# Patient Record
Sex: FEMALE | Race: WHITE | Employment: OTHER | URBAN - METROPOLITAN AREA
[De-identification: names, ages, dates, MRNs, and addresses within clinical notes are randomized per-mention and may not be internally consistent; named-entity substitution may affect disease eponyms.]

---

## 2017-01-03 ENCOUNTER — APPOINTMENT (OUTPATIENT)
Dept: GENERAL RADIOLOGY | Age: 67
End: 2017-01-03
Attending: FAMILY MEDICINE

## 2017-01-03 ENCOUNTER — HOSPITAL ENCOUNTER (OUTPATIENT)
Age: 67
Discharge: HOME | End: 2017-01-03
Attending: FAMILY MEDICINE

## 2017-01-03 VITALS
HEIGHT: 60 IN | OXYGEN SATURATION: 100 % | BODY MASS INDEX: 26.9 KG/M2 | HEART RATE: 80 BPM | RESPIRATION RATE: 14 BRPM | WEIGHT: 137 LBS | TEMPERATURE: 99 F | SYSTOLIC BLOOD PRESSURE: 139 MMHG | DIASTOLIC BLOOD PRESSURE: 85 MMHG

## 2017-01-03 DIAGNOSIS — J20.9 ACUTE BRONCHITIS, UNSPECIFIED ORGANISM: ICD-10-CM

## 2017-01-03 DIAGNOSIS — J02.9 ACUTE PHARYNGITIS, UNSPECIFIED ETIOLOGY: Primary | ICD-10-CM

## 2017-01-03 LAB — POCT RAPID STREP: NEGATIVE

## 2017-01-03 PROCEDURE — 87430 STREP A AG IA: CPT | Performed by: FAMILY MEDICINE

## 2017-01-03 PROCEDURE — 71020 XR CHEST PA + LAT CHEST (CPT=71020): CPT

## 2017-01-03 PROCEDURE — 99214 OFFICE O/P EST MOD 30 MIN: CPT

## 2017-01-03 PROCEDURE — 99204 OFFICE O/P NEW MOD 45 MIN: CPT

## 2017-01-03 PROCEDURE — 87081 CULTURE SCREEN ONLY: CPT | Performed by: FAMILY MEDICINE

## 2017-01-03 RX ORDER — AZITHROMYCIN 200 MG/5ML
POWDER, FOR SUSPENSION ORAL
Qty: 1 BOTTLE | Refills: 0 | Status: SHIPPED | OUTPATIENT
Start: 2017-01-03 | End: 2017-01-19 | Stop reason: ALTCHOICE

## 2017-01-03 RX ORDER — AZITHROMYCIN 250 MG/1
TABLET, FILM COATED ORAL
Qty: 1 PACKAGE | Refills: 0 | Status: SHIPPED | OUTPATIENT
Start: 2017-01-03 | End: 2017-01-03

## 2017-01-03 NOTE — ED PROVIDER NOTES
Patient Seen in: [facility]    History   Patient presents with:  Fever  Cough/URI  Sore Throat    Stated Complaint: fever, cough x3 days    HPI    Talya Meghann is a 74year old female presented with chief complaint of sor Valsartan-Hydrochlorothiazide (DIOVAN HCT) 160-25 MG Oral Tab,  1 TABLET DAILY       Family History   Problem Relation Age of Onset   • Heart Attack Father    • Heart Disorder Mother    • Infectious Disease Maternal Grandmother    • Diabetes Brother    • S regular rhythm and normal heart sounds. Pulmonary/Chest: Effort normal and breath sounds normal. No respiratory distress. She has no wheezes. Neurological: She is alert and oriented to person, place, and time.        ED Course     Labs Reviewed   POCT

## 2017-01-03 NOTE — ED INITIAL ASSESSMENT (HPI)
The patient is here with complaints of a cough, sore throat, and fever. She states her fever was 100 this morning when she woke up and the sore throat and cough have been present x 4 days. She states the phlegm she has been coughing up is clear in color.

## 2017-01-03 NOTE — ED NOTES
Spoke with pharmacy tech at [facility] and the prescription for azithromycin pill form was cancelled as patient wants the liquid.

## 2017-01-09 ENCOUNTER — PRIOR ORIGINAL RECORDS (OUTPATIENT)
Dept: OTHER | Age: 67
End: 2017-01-09

## 2017-01-09 ENCOUNTER — LAB ENCOUNTER (OUTPATIENT)
Dept: LAB | Facility: HOSPITAL | Age: 67
End: 2017-01-09
Attending: SPEECH-LANGUAGE PATHOLOGIST
Payer: MEDICARE

## 2017-01-09 DIAGNOSIS — I10 ESSENTIAL HYPERTENSION, MALIGNANT: ICD-10-CM

## 2017-01-09 DIAGNOSIS — E78.00 PURE HYPERCHOLESTEROLEMIA: ICD-10-CM

## 2017-01-09 DIAGNOSIS — E03.9 UNSPECIFIED HYPOTHYROIDISM: ICD-10-CM

## 2017-01-09 DIAGNOSIS — E55.0 RICKETS, ACTIVE: Primary | ICD-10-CM

## 2017-01-09 LAB
25-HYDROXYVITAMIN D (TOTAL): 41.3 NG/ML (ref 30–100)
ALBUMIN SERPL-MCNC: 3.2 G/DL (ref 3.5–4.8)
ALP LIVER SERPL-CCNC: 67 U/L (ref 55–142)
ALT SERPL-CCNC: 18 U/L (ref 14–54)
AST SERPL-CCNC: 21 U/L (ref 15–41)
BILIRUB SERPL-MCNC: 0.6 MG/DL (ref 0.1–2)
BUN BLD-MCNC: 14 MG/DL (ref 8–20)
CALCIUM BLD-MCNC: 8.8 MG/DL (ref 8.3–10.3)
CHLORIDE: 106 MMOL/L (ref 101–111)
CHOLEST SMN-MCNC: 144 MG/DL (ref ?–200)
CO2: 27 MMOL/L (ref 22–32)
CREAT BLD-MCNC: 0.75 MG/DL (ref 0.55–1.02)
GLUCOSE BLD-MCNC: 78 MG/DL (ref 70–99)
HDLC SERPL-MCNC: 77 MG/DL (ref 45–?)
HDLC SERPL: 1.87 {RATIO} (ref ?–4.44)
LDLC SERPL CALC-MCNC: 54 MG/DL (ref ?–130)
M PROTEIN MFR SERPL ELPH: 6.7 G/DL (ref 6.1–8.3)
NONHDLC SERPL-MCNC: 67 MG/DL (ref ?–130)
POTASSIUM SERPL-SCNC: 4.3 MMOL/L (ref 3.6–5.1)
SODIUM SERPL-SCNC: 139 MMOL/L (ref 136–144)
TRIGLYCERIDES: 67 MG/DL (ref ?–150)
TSI SER-ACNC: 2.11 MIU/ML (ref 0.35–5.5)
VLDL: 13 MG/DL (ref 5–40)

## 2017-01-09 PROCEDURE — 84443 ASSAY THYROID STIM HORMONE: CPT

## 2017-01-09 PROCEDURE — 80053 COMPREHEN METABOLIC PANEL: CPT

## 2017-01-09 PROCEDURE — 36415 COLL VENOUS BLD VENIPUNCTURE: CPT

## 2017-01-09 PROCEDURE — 80061 LIPID PANEL: CPT

## 2017-01-09 PROCEDURE — 82306 VITAMIN D 25 HYDROXY: CPT

## 2017-01-11 LAB
ALBUMIN: 3.2 G/DL
ALKALINE PHOSPHATATE(ALK PHOS): 67 IU/L
BILIRUBIN TOTAL: 0.6 MG/DL
BUN: 14 MG/DL
CALCIUM: 8.8 MG/DL
CHLORIDE: 106 MEQ/L
CHOLESTEROL, TOTAL: 144 MG/DL
CREATININE, SERUM: 0.75 MG/DL
GLUCOSE: 78 MG/DL
HDL CHOLESTEROL: 77 MG/DL
LDL CHOLESTEROL: 54 MG/DL
POTASSIUM, SERUM: 4.3 MEQ/L
PROTEIN, TOTAL: 6.7 G/DL
SGOT (AST): 21 IU/L
SGPT (ALT): 18 IU/L
SODIUM: 139 MEQ/L
THYROID STIMULATING HORMONE: 2.11 MLU/L
TRIGLYCERIDES: 67 MG/DL
VITAMIN D 25-OH: 41.3 NG/ML

## 2017-01-19 ENCOUNTER — OFFICE VISIT (OUTPATIENT)
Dept: INTERNAL MEDICINE CLINIC | Facility: CLINIC | Age: 67
End: 2017-01-19

## 2017-01-19 VITALS
WEIGHT: 138.63 LBS | HEIGHT: 59.5 IN | SYSTOLIC BLOOD PRESSURE: 122 MMHG | RESPIRATION RATE: 16 BRPM | BODY MASS INDEX: 27.58 KG/M2 | TEMPERATURE: 98 F | DIASTOLIC BLOOD PRESSURE: 70 MMHG | HEART RATE: 68 BPM

## 2017-01-19 DIAGNOSIS — I10 ESSENTIAL HYPERTENSION: ICD-10-CM

## 2017-01-19 DIAGNOSIS — Z00.00 MEDICARE ANNUAL WELLNESS VISIT, SUBSEQUENT: ICD-10-CM

## 2017-01-19 DIAGNOSIS — Z00.00 ENCOUNTER FOR ANNUAL HEALTH EXAMINATION: Primary | ICD-10-CM

## 2017-01-19 DIAGNOSIS — M85.80 OSTEOPENIA: ICD-10-CM

## 2017-01-19 DIAGNOSIS — Z13.31 DEPRESSION SCREENING: ICD-10-CM

## 2017-01-19 DIAGNOSIS — R90.82 WHITE MATTER ABNORMALITY ON MRI OF BRAIN: ICD-10-CM

## 2017-01-19 DIAGNOSIS — E03.9 ACQUIRED HYPOTHYROIDISM: ICD-10-CM

## 2017-01-19 PROCEDURE — G0444 DEPRESSION SCREEN ANNUAL: HCPCS | Performed by: INTERNAL MEDICINE

## 2017-01-19 PROCEDURE — G0439 PPPS, SUBSEQ VISIT: HCPCS | Performed by: INTERNAL MEDICINE

## 2017-01-19 RX ORDER — LEVOTHYROXINE SODIUM 0.03 MG/1
25 TABLET ORAL
Qty: 90 TABLET | Refills: 1 | Status: SHIPPED | OUTPATIENT
Start: 2017-01-19 | End: 2017-08-12

## 2017-01-19 NOTE — PATIENT INSTRUCTIONS
Lisa Malone's SCREENING SCHEDULE   Tests on this list are recommended by your physician but may not be covered, or covered at this frequency, by your insurer. Please check with your insurance carrier before scheduling to verify coverage.    PREVENTATIV their lifetime   • Anyone with a family history    Colorectal Cancer Screening  Covered up to Age 76     Colonoscopy Screen   Covered every 10 years- more often if abnormal Colonoscopy,5 Years due on 11/27/2017 Update Health Maintenance if applicable    Fl (Prevnar)  Covered Once after 65   Orders placed or performed in visit on 01/12/16  -PNEUMOCOCCAL VACC, 13 MISAEL IM    Please get once after your 65th birthday    Pneumococcal 23 (Pneumovax)  Covered Once after 65   Orders placed or performed in visit on 11/

## 2017-01-19 NOTE — PROGRESS NOTES
HPI:   Estela Villaseñor is a 74year old female who presents for a Medicare Subsequent Annual Wellness visit (Pt already had Initial Annual Wellness). Here for medicare well visit. Has stable chroni well controlled htn, hypothyroid.  Doing well, sees gyn H/O bone density study (12/9/2013); H/O mammogram (12/26/2014); Amenorrhea; Fibroids; and Hypothyroidism. She  has past surgical history that includes remv cartilage for graft nasal (1981);  Colectomy (2002); other surgical history (1990); other surgical the following:    Height as of this encounter: 59.5\". Weight as of this encounter: 138 lb 9.6 oz.     Medicare Hearing Assessment  (Required for AWV/SWV)    Whispered Voice     Visual Acuity                           General Appearance:  Alert, cooperat hypertension  -stable continue meds  Acquired hypothyroidism  -stable continue meds  White matter abnormality on MRI of brain  -stable continue meds  Osteopenia  -stable, rpt dexa next year  Medicare annual wellness visit, subsequent  -c-scope this winter help    Are you able to afford your medications?: Yes    Hearing Problems?: No     Functional Status     Hearing Problems?: No    Vision Problems? : No    Difficulty walking?: No    Difficulty dressing or bathing?: No    Problems with daily activities? : N Disease Screening     LDL Annually LDL CHOLESTEROL (mg/dL)   Date Value   01/09/2017 54     LDL CHOLESTROL (mg/dL)   Date Value   05/08/2014 62        EKG - w/ Initial Preventative Physical Exam only, or if medically necessary Electrocardiogram date02/02/2 Medications (ACE/ARB, digoxin diuretics, anticonvulsants.)    Potassium  Annually POTASSIUM (mmol/L)   Date Value   01/09/2017 4.3   05/08/2014 4.4    No flowsheet data found.     Creatinine  Annually CREATININE (mg/dL)   Date Value   01/09/2017 0.75   05

## 2017-02-10 ENCOUNTER — OFFICE VISIT (OUTPATIENT)
Dept: OBGYN CLINIC | Facility: CLINIC | Age: 67
End: 2017-02-10

## 2017-02-10 VITALS
SYSTOLIC BLOOD PRESSURE: 116 MMHG | HEIGHT: 59.5 IN | DIASTOLIC BLOOD PRESSURE: 72 MMHG | HEART RATE: 83 BPM | BODY MASS INDEX: 27.85 KG/M2 | WEIGHT: 140 LBS

## 2017-02-10 DIAGNOSIS — Z12.4 SCREENING FOR MALIGNANT NEOPLASM OF CERVIX: Primary | ICD-10-CM

## 2017-02-10 DIAGNOSIS — Z12.31 VISIT FOR SCREENING MAMMOGRAM: ICD-10-CM

## 2017-02-10 PROCEDURE — G0101 CA SCREEN;PELVIC/BREAST EXAM: HCPCS | Performed by: OBSTETRICS & GYNECOLOGY

## 2017-02-10 NOTE — PROGRESS NOTES
Annual  No C/O  Nothing new  Recent URI, was in Urgent care    ROS: No Cardiac, Respiratory, GI,  or Neurological symptoms.     PE:  GENERAL: well developed, well nourished, in no apparent distress  SKIN: no rashes, no suspicious lesions  HEENT: normal  N

## 2017-02-14 ENCOUNTER — HOSPITAL ENCOUNTER (OUTPATIENT)
Dept: MAMMOGRAPHY | Facility: HOSPITAL | Age: 67
Discharge: HOME | End: 2017-02-14
Attending: OBSTETRICS & GYNECOLOGY
Payer: MEDICARE

## 2017-02-14 DIAGNOSIS — Z12.31 VISIT FOR SCREENING MAMMOGRAM: ICD-10-CM

## 2017-02-14 PROCEDURE — 77067 SCR MAMMO BI INCL CAD: CPT

## 2017-02-14 NOTE — PROGRESS NOTES
Quick Note:    Patient notified of normal mammogram results. To follow up 1 year/ prn with any concerns.   ______

## 2017-03-31 ENCOUNTER — HOSPITAL ENCOUNTER (OUTPATIENT)
Facility: HOSPITAL | Age: 67
Setting detail: OBSERVATION
Discharge: HOME | End: 2017-04-01
Attending: EMERGENCY MEDICINE | Admitting: INTERNAL MEDICINE
Payer: MEDICARE

## 2017-03-31 ENCOUNTER — APPOINTMENT (OUTPATIENT)
Dept: GENERAL RADIOLOGY | Facility: HOSPITAL | Age: 67
End: 2017-03-31
Attending: EMERGENCY MEDICINE
Payer: MEDICARE

## 2017-03-31 DIAGNOSIS — R00.2 PALPITATIONS: ICD-10-CM

## 2017-03-31 DIAGNOSIS — R07.9 ACUTE CHEST PAIN: Primary | ICD-10-CM

## 2017-03-31 PROBLEM — E87.6 HYPOKALEMIA: Status: ACTIVE | Noted: 2017-03-31

## 2017-03-31 PROBLEM — R73.9 HYPERGLYCEMIA: Status: ACTIVE | Noted: 2017-03-31

## 2017-03-31 PROCEDURE — 99220 INITIAL OBSERVATION CARE,LEVL III: CPT | Performed by: INTERNAL MEDICINE

## 2017-03-31 PROCEDURE — 71010 XR CHEST AP PORTABLE  (CPT=71010): CPT

## 2017-04-01 ENCOUNTER — PRIOR ORIGINAL RECORDS (OUTPATIENT)
Dept: OTHER | Age: 67
End: 2017-04-01

## 2017-04-01 ENCOUNTER — APPOINTMENT (OUTPATIENT)
Dept: CV DIAGNOSTICS | Facility: HOSPITAL | Age: 67
End: 2017-04-01
Attending: INTERNAL MEDICINE
Payer: MEDICARE

## 2017-04-01 VITALS
OXYGEN SATURATION: 99 % | HEIGHT: 60 IN | SYSTOLIC BLOOD PRESSURE: 113 MMHG | WEIGHT: 139.56 LBS | BODY MASS INDEX: 27.4 KG/M2 | RESPIRATION RATE: 18 BRPM | HEART RATE: 72 BPM | TEMPERATURE: 98 F | DIASTOLIC BLOOD PRESSURE: 63 MMHG

## 2017-04-01 PROBLEM — R00.2 PALPITATIONS: Status: ACTIVE | Noted: 2017-04-01

## 2017-04-01 PROCEDURE — 93350 STRESS TTE ONLY: CPT | Performed by: INTERNAL MEDICINE

## 2017-04-01 PROCEDURE — 93350 STRESS TTE ONLY: CPT

## 2017-04-01 PROCEDURE — 99217 OBSERVATION CARE DISCHARGE: CPT | Performed by: HOSPITALIST

## 2017-04-01 PROCEDURE — 93306 TTE W/DOPPLER COMPLETE: CPT | Performed by: INTERNAL MEDICINE

## 2017-04-01 PROCEDURE — 93306 TTE W/DOPPLER COMPLETE: CPT

## 2017-04-01 PROCEDURE — 93017 CV STRESS TEST TRACING ONLY: CPT

## 2017-04-01 PROCEDURE — 93018 CV STRESS TEST I&R ONLY: CPT | Performed by: INTERNAL MEDICINE

## 2017-04-01 RX ORDER — NITROGLYCERIN 0.4 MG/1
0.4 TABLET SUBLINGUAL EVERY 5 MIN PRN
Status: DISCONTINUED | OUTPATIENT
Start: 2017-04-01 | End: 2017-04-01

## 2017-04-01 RX ORDER — ARIPIPRAZOLE 15 MG/1
40 TABLET ORAL EVERY 4 HOURS
Status: COMPLETED | OUTPATIENT
Start: 2017-04-01 | End: 2017-04-01

## 2017-04-01 RX ORDER — POTASSIUM CHLORIDE 20 MEQ/1
40 TABLET, EXTENDED RELEASE ORAL EVERY 4 HOURS
Status: DISCONTINUED | OUTPATIENT
Start: 2017-04-01 | End: 2017-04-01

## 2017-04-01 RX ORDER — ENOXAPARIN SODIUM 100 MG/ML
40 INJECTION SUBCUTANEOUS DAILY
Status: DISCONTINUED | OUTPATIENT
Start: 2017-04-01 | End: 2017-04-01

## 2017-04-01 RX ORDER — ACETAMINOPHEN 325 MG/1
650 TABLET ORAL EVERY 6 HOURS PRN
Status: DISCONTINUED | OUTPATIENT
Start: 2017-04-01 | End: 2017-04-01

## 2017-04-01 RX ORDER — LEVOTHYROXINE SODIUM 0.03 MG/1
25 TABLET ORAL
Status: DISCONTINUED | OUTPATIENT
Start: 2017-04-01 | End: 2017-04-01

## 2017-04-01 RX ORDER — MULTIPLE VITAMINS W/ MINERALS TAB 9MG-400MCG
1 TAB ORAL DAILY
Status: DISCONTINUED | OUTPATIENT
Start: 2017-04-01 | End: 2017-04-01

## 2017-04-01 RX ORDER — ASPIRIN 325 MG
325 TABLET, DELAYED RELEASE (ENTERIC COATED) ORAL ONCE
Status: COMPLETED | OUTPATIENT
Start: 2017-04-01 | End: 2017-04-01

## 2017-04-01 RX ORDER — VALSARTAN AND HYDROCHLOROTHIAZIDE 160; 25 MG/1; MG/1
1 TABLET ORAL
Status: DISCONTINUED | OUTPATIENT
Start: 2017-04-01 | End: 2017-04-01 | Stop reason: ALTCHOICE

## 2017-04-01 RX ORDER — SODIUM CHLORIDE 9 MG/ML
INJECTION, SOLUTION INTRAVENOUS CONTINUOUS
Status: ACTIVE | OUTPATIENT
Start: 2017-04-01 | End: 2017-04-01

## 2017-04-01 RX ORDER — ASPIRIN 81 MG/1
81 TABLET, CHEWABLE ORAL DAILY
Status: DISCONTINUED | OUTPATIENT
Start: 2017-04-01 | End: 2017-04-01

## 2017-04-01 RX ORDER — ONDANSETRON 2 MG/ML
4 INJECTION INTRAMUSCULAR; INTRAVENOUS EVERY 4 HOURS PRN
Status: DISCONTINUED | OUTPATIENT
Start: 2017-04-01 | End: 2017-04-01

## 2017-04-01 NOTE — PROGRESS NOTES
Cardiac stress lab:     Stress echo was normal. No cp. No st changes. No wall motion abnormalities.      Rika cortez md  [facility]

## 2017-04-01 NOTE — ED INITIAL ASSESSMENT (HPI)
Midsternal Chest Pain since 2030 today. No nausea/vomiting. No shortness of breath at this time. Pt also reports head pain all week.

## 2017-04-01 NOTE — CONSULTS
[address]    PATIENT'S NAME: Larry DUMONT   ATTENDING PHYSICIAN: Tex Austin D.O.   CONSULTING PHYSICIAN: Aide Hong M.D.    PATIENT ACCOUNT#:   [de-identified]    LOCATION:  [address] [place] [address]  MEDICAL RECORD #:   [id number]       DATE OF BIR Clear.  HEART:  No murmurs, gallops, or rubs. CHEST WALL:  She is mildly tender under the xiphoid process and this recreates her chest discomfort. ABDOMEN:  Bowel sounds were normal.  The abdomen is nontender.   EXTREMITIES:  Without clubbing, cyanosis, e

## 2017-04-01 NOTE — PLAN OF CARE
CARDIOVASCULAR - ADULT    • Maintains optimal cardiac output and hemodynamic stability Progressing    • Absence of cardiac arrhythmias or at baseline Progressing        METABOLIC/FLUID AND ELECTROLYTES - ADULT    • Electrolytes maintained within normal rosales

## 2017-04-01 NOTE — CONSULTS
[facility] consult: full note dictated    73 y/o wf with hypercholesterolemia refusing statins who presents complaining of a single episode of sharp lower sternal cp lasting 2 mins, not assd with sob or diaphoresis. She never felt before.  She felt

## 2017-04-01 NOTE — HISTORICAL OFFICE NOTE
Donald   : 10/23/1950  ACCOUNT:  [id number]  [phone]  PCP: Dr. Bakari Azar    TODAY'S DATE: 2016  DICTATED BY:  Sage Lerma M.D.]    CHIEF COMPLAINT: [Followup of Bicuspid aortic valve.]    HPI: Elliott Villalta is a pleasant 59-year-old lady wi BMI parameters reviewed and discussed. HEAD/FACE: no trauma and normocephalic. EYES: conjunctivae not injected and no xanthelasma. ENT: mucosa pink and moist. NECK: jugular venous pressure not elevated.  RESP: respirations with normal rate and rhythm, clear ID: [id number]   C: Dr. Gena Vargas

## 2017-04-01 NOTE — BH RN ADMISSION NOTE
Received patient from ED. She is alert and oriented and not showing any signs of distress. She was oriented to room set up. Needs attended.

## 2017-04-01 NOTE — PROGRESS NOTES
NURSING DISCHARGE NOTE      Pt. Discharged home. IV removed, tele dc'd and returned to monitor tech. F/U instructions provided and discussed. Pt. And family verbalized understanding. Rx is given.   Discussed adver

## 2017-04-01 NOTE — H&P
[facility]OSPITAL[facility]  History and Physical     Talya Meghann Patient Status:  Emergency    10/23/1950 MRN [id number]   Location [facility] Attending Eleazar Hough MD   Hosp Day # 1 PCP Silvano Avalos MD     Chief Complaint • Heart Disorder Mother    • Infectious Disease Maternal Grandmother    • Diabetes Brother    • Stroke Brother    • Cancer Brother    • Diabetes Mother         • Diabetes Maternal Grandmother         • Hypertension Mother       2213   WBC  8.0   HGB  12.8   MCV  88.0   PLT  294.0       Recent Labs   Lab  03/31/17   2213   GLU  137*   BUN  17   CREATSERUM  1.01   CA  9.3   ALB  3.6   NA  141   K  3.3*   CL  105   CO2  28.0   ALKPHO  67   AST  26   ALT  21   BILT  0.3   TP  7.3

## 2017-04-01 NOTE — ED PROVIDER NOTES
Patient Seen in: [facility] Emergency Department    History   Patient presents with:  Chest Pain Angina (cardiovascular)    Stated Complaint: chest pain    HPI    68-year-old with a history of hypertension, hyperthyroidism resents for evaluation of NEEDLE LOCALIZATION W/ SPECIMEN 1 SITE RIGHT      [facility]      COLONOSCOPY               Medications :   Levothyroxine Sodium 25 MCG Oral Tab,  Take 1 tablet (25 mcg total) by oleg in no acute distress. HEENT:  Sclera are not icteric. Conjunctivae within normal limits. Mucous members are moist.   Cardiovascular: Regular rate and rhythm, normal S1-S2. Respiratory: Lungs are clear to auscultation bilaterally.    Abdomen: Soft, nont normal in size.  The lungs are clear of acute-appearing disease process.  The costophrenic angles are sharp.  There is no active disease seen on the basis of portable chest radiography. patient was given aspirin.   MDM     67-year-old with acute onset o

## 2017-04-03 ENCOUNTER — OFFICE VISIT (OUTPATIENT)
Dept: INTERNAL MEDICINE CLINIC | Facility: CLINIC | Age: 67
End: 2017-04-03

## 2017-04-03 ENCOUNTER — PRIOR ORIGINAL RECORDS (OUTPATIENT)
Dept: OTHER | Age: 67
End: 2017-04-03

## 2017-04-03 VITALS
WEIGHT: 138.81 LBS | HEART RATE: 72 BPM | TEMPERATURE: 98 F | RESPIRATION RATE: 16 BRPM | SYSTOLIC BLOOD PRESSURE: 138 MMHG | HEIGHT: 59.5 IN | DIASTOLIC BLOOD PRESSURE: 82 MMHG | BODY MASS INDEX: 27.61 KG/M2

## 2017-04-03 DIAGNOSIS — I10 ESSENTIAL HYPERTENSION: Primary | ICD-10-CM

## 2017-04-03 DIAGNOSIS — E87.6 HYPOKALEMIA: ICD-10-CM

## 2017-04-03 DIAGNOSIS — R73.9 HYPERGLYCEMIA: ICD-10-CM

## 2017-04-03 DIAGNOSIS — Z09 HOSPITAL DISCHARGE FOLLOW-UP: ICD-10-CM

## 2017-04-03 PROBLEM — R07.9 ACUTE CHEST PAIN: Status: RESOLVED | Noted: 2017-03-31 | Resolved: 2017-04-03

## 2017-04-03 PROBLEM — R00.2 PALPITATIONS: Status: RESOLVED | Noted: 2017-04-01 | Resolved: 2017-04-03

## 2017-04-03 PROCEDURE — 99213 OFFICE O/P EST LOW 20 MIN: CPT | Performed by: INTERNAL MEDICINE

## 2017-04-03 NOTE — PROGRESS NOTES
Patient presents with: Follow - Up: from the ER was admitted on 3/31/17 for having palpitations. HPI:  Here for f/u from ER atypical chest pain and palpitations. Cards w/u with neg stress echo and echo.  Has low K and mg, replaced and normal at disch Normal rate, regular rhythm and intact distal pulses. No murmur, rubs or gallops. Pulmonary/Chest: Effort normal and breath sounds normal. No respiratory distress. Abdominal: Soft.  Bowel sounds are normal. Non tender, no masses, no organomegaly or rl

## 2017-04-04 NOTE — DISCHARGE SUMMARY
[facility]  DISCHARGE SUMMARY     Luba Robb [address] [place] [address] Patient Status:  Observation    10/23/1950 MRN [id number]   [facility] 2NE-A Attending No att. providers found   Hosp Day # 1 PCP Jacquelin Mjeia MD     Date of Admission: 3/31/2017 · None    Consultants:  • Cardiology-Dr. Delano Ley    Discharge Medication List:     Discharge Medications      CONTINUE taking these medications       Instructions Prescription details    aspirin 81 MG Tabs        Take 1 tablet by mouth daily.     Refills

## 2017-05-04 ENCOUNTER — APPOINTMENT (OUTPATIENT)
Dept: LAB | Age: 67
End: 2017-05-04
Attending: INTERNAL MEDICINE
Payer: MEDICARE

## 2017-05-04 ENCOUNTER — PRIOR ORIGINAL RECORDS (OUTPATIENT)
Dept: OTHER | Age: 67
End: 2017-05-04

## 2017-05-04 DIAGNOSIS — I10 ESSENTIAL HYPERTENSION: ICD-10-CM

## 2017-05-04 DIAGNOSIS — Z09 HOSPITAL DISCHARGE FOLLOW-UP: ICD-10-CM

## 2017-05-04 DIAGNOSIS — R73.9 HYPERGLYCEMIA: ICD-10-CM

## 2017-05-04 DIAGNOSIS — E87.6 HYPOKALEMIA: ICD-10-CM

## 2017-05-04 PROCEDURE — 83036 HEMOGLOBIN GLYCOSYLATED A1C: CPT

## 2017-05-04 PROCEDURE — 80053 COMPREHEN METABOLIC PANEL: CPT

## 2017-05-04 PROCEDURE — 83735 ASSAY OF MAGNESIUM: CPT

## 2017-06-19 ENCOUNTER — APPOINTMENT (OUTPATIENT)
Dept: CT IMAGING | Facility: HOSPITAL | Age: 67
End: 2017-06-19
Attending: PHYSICIAN ASSISTANT
Payer: MEDICARE

## 2017-06-19 ENCOUNTER — HOSPITAL ENCOUNTER (EMERGENCY)
Facility: HOSPITAL | Age: 67
Discharge: HOME | End: 2017-06-19
Attending: EMERGENCY MEDICINE
Payer: MEDICARE

## 2017-06-19 VITALS
SYSTOLIC BLOOD PRESSURE: 129 MMHG | HEART RATE: 68 BPM | WEIGHT: 138 LBS | HEIGHT: 60 IN | BODY MASS INDEX: 27.09 KG/M2 | RESPIRATION RATE: 16 BRPM | DIASTOLIC BLOOD PRESSURE: 82 MMHG | TEMPERATURE: 98 F | OXYGEN SATURATION: 100 %

## 2017-06-19 DIAGNOSIS — S06.0X0A MILD CONCUSSION, WITHOUT LOC, INITIAL ENCOUNTER: ICD-10-CM

## 2017-06-19 DIAGNOSIS — S00.03XA CONTUSION OF SCALP, INITIAL ENCOUNTER: Primary | ICD-10-CM

## 2017-06-19 PROCEDURE — 70450 CT HEAD/BRAIN W/O DYE: CPT | Performed by: PHYSICIAN ASSISTANT

## 2017-06-19 PROCEDURE — 99284 EMERGENCY DEPT VISIT MOD MDM: CPT

## 2017-06-19 RX ORDER — ONDANSETRON 4 MG/1
4 TABLET, ORALLY DISINTEGRATING ORAL EVERY 4 HOURS PRN
Qty: 10 TABLET | Refills: 0 | Status: SHIPPED | OUTPATIENT
Start: 2017-06-19 | End: 2017-06-26

## 2017-06-19 RX ORDER — ACETAMINOPHEN 500 MG
1000 TABLET ORAL ONCE
Status: DISCONTINUED | OUTPATIENT
Start: 2017-06-19 | End: 2017-06-20

## 2017-06-19 RX ORDER — ONDANSETRON 4 MG/1
4 TABLET, ORALLY DISINTEGRATING ORAL ONCE
Status: COMPLETED | OUTPATIENT
Start: 2017-06-19 | End: 2017-06-19

## 2017-06-19 RX ORDER — ACETAMINOPHEN 160 MG/5ML
15 SOLUTION ORAL ONCE
Status: COMPLETED | OUTPATIENT
Start: 2017-06-19 | End: 2017-06-19

## 2017-06-20 ENCOUNTER — HOSPITAL ENCOUNTER (EMERGENCY)
Facility: HOSPITAL | Age: 67
Discharge: HOME | End: 2017-06-20
Attending: EMERGENCY MEDICINE
Payer: MEDICARE

## 2017-06-20 ENCOUNTER — TELEPHONE (OUTPATIENT)
Dept: INTERNAL MEDICINE CLINIC | Facility: CLINIC | Age: 67
End: 2017-06-20

## 2017-06-20 VITALS
TEMPERATURE: 99 F | WEIGHT: 138 LBS | HEIGHT: 64 IN | RESPIRATION RATE: 16 BRPM | BODY MASS INDEX: 23.56 KG/M2 | DIASTOLIC BLOOD PRESSURE: 68 MMHG | HEART RATE: 73 BPM | OXYGEN SATURATION: 97 % | SYSTOLIC BLOOD PRESSURE: 128 MMHG

## 2017-06-20 DIAGNOSIS — S06.0X0D CONCUSSION WITH NO LOSS OF CONSCIOUSNESS, SUBSEQUENT ENCOUNTER: Primary | ICD-10-CM

## 2017-06-20 PROCEDURE — 99283 EMERGENCY DEPT VISIT LOW MDM: CPT

## 2017-06-20 RX ORDER — HYDROCODONE BITARTRATE AND ACETAMINOPHEN 5; 325 MG/1; MG/1
1-2 TABLET ORAL EVERY 4 HOURS PRN
Qty: 20 TABLET | Refills: 0 | Status: SHIPPED | OUTPATIENT
Start: 2017-06-20 | End: 2017-06-27
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
## 2017-06-20 NOTE — TELEPHONE ENCOUNTER
JV, this pt is in your schedule tomorrow for fup. Pt called back to say that she felt the headache got worse so she returned to the ER.   Dr Thalia Welch gave her Norco for the headache, so she is now going home to rest.  Dr Thalia Welch did not feel the need for repeat

## 2017-06-20 NOTE — ED INITIAL ASSESSMENT (HPI)
Pt was seen yesterday dx with concussion pt states she thought she would feel better today she took the nausea meds and did not help

## 2017-06-20 NOTE — ED PROVIDER NOTES
Patient Seen in: [facility] Emergency Department    History   Patient presents with:  Head Neck Injury (neurologic, musculoskeletal)    Stated Complaint: head inj    HPI    Ana Maria Edge is a 64-year-old female who presents today for evaluation of a head i Levothyroxine Sodium 25 MCG Oral Tab,  Take 1 tablet (25 mcg total) by mouth once daily. Cholecalciferol 1000 UNITS Oral Chew Tab,  Chew 2 tablets by mouth daily. aspirin 81 MG Oral Tab,  Take 1 tablet by mouth daily.    Multiple Vitamins-Minerals (MUL Right Ear: Hearing, tympanic membrane, external ear and ear canal normal. Tympanic membrane is not bulging. No hemotympanum. Left Ear: Hearing, tympanic membrane, external ear and ear canal normal. Tympanic membrane is not bulging. No hemotympanum.    Nos 6/19/2017  PROCEDURE:  CT BRAIN OR HEAD ([id number])  COMPARISON:  [facility] , CT BRAIN OR HEAD (16694), 2/02/2015, 15:31. [facility] , MRI BRAIN/IAC (ALL W+WO CNTRST) (CPT=70553), 2/06/2015, 10:22.   INDICATIONS:  head inj  TECHNIQUE:  Noncontrast CT scanning is perf Plan: Patient is instructed on brain rest.  She is encouraged to return if she develops any neurological symptoms or any other symptoms that are concerning to her. She is discharged with a prescription for Zofran for control of her nausea.   She may take i
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
## 2017-06-22 ENCOUNTER — OFFICE VISIT (OUTPATIENT)
Dept: INTERNAL MEDICINE CLINIC | Facility: CLINIC | Age: 67
End: 2017-06-22

## 2017-06-22 VITALS
HEART RATE: 64 BPM | BODY MASS INDEX: 27.48 KG/M2 | DIASTOLIC BLOOD PRESSURE: 64 MMHG | WEIGHT: 140 LBS | SYSTOLIC BLOOD PRESSURE: 122 MMHG | HEIGHT: 60 IN | RESPIRATION RATE: 16 BRPM | TEMPERATURE: 99 F

## 2017-06-22 DIAGNOSIS — K59.03 DRUG-INDUCED CONSTIPATION: ICD-10-CM

## 2017-06-22 DIAGNOSIS — G44.309 POST-CONCUSSION HEADACHE: Primary | ICD-10-CM

## 2017-06-22 PROCEDURE — 99213 OFFICE O/P EST LOW 20 MIN: CPT | Performed by: NURSE PRACTITIONER

## 2017-06-22 NOTE — PROGRESS NOTES
Patient presents with:  ER F/U: 06/20/17 concussion w/o LOC- Pt states she has intermintant nausea and HA- Pt states she is not taking zofran due to constipation- pt denies any visual or hearing changes      HPI:  Presents for follow up of 2 ER visits rela Prescriptions:  HYDROcodone-acetaminophen 5-325 MG Oral Tab Take 1-2 tablets by mouth every 4 (four) hours as needed for Pain. Disp: 20 tablet Rfl: 0   Levothyroxine Sodium 25 MCG Oral Tab Take 1 tablet (25 mcg total) by mouth once daily.  Disp: 90 tablet R until BM. Then Miralax daily while taking Norco (already has Miralax home, wants to use this). Notify office if no BM in 2-3 days. No orders of the defined types were placed in this encounter.        Meds & Refills for this Visit:  No prescriptions requ

## 2017-06-22 NOTE — PATIENT INSTRUCTIONS
Take Milk of Magnesia (follow the 's instructions) once daily until you have bowel movement. Then take Miralax daily as long as you are taking the Norco (stop taking Miralax if you develop loose stools or diarrhea).      Get rest. Notify office

## 2017-07-06 ENCOUNTER — TELEPHONE (OUTPATIENT)
Dept: INTERNAL MEDICINE CLINIC | Facility: CLINIC | Age: 67
End: 2017-07-06

## 2017-07-06 NOTE — TELEPHONE ENCOUNTER
These measures are appropriate. Thanks. Also, she should not wear contact lenses (if she has those) at this time. Thanks.

## 2017-07-06 NOTE — TELEPHONE ENCOUNTER
S/w pt. States she is not sure if it is a stye or not, it is a lump on the inside of her eye lid. Denies redness or swelling. + tender to touch and + mild, clear draining. Pt does not have an optho.    Would like to try some treatment at home first.  We [facility]

## 2017-07-24 ENCOUNTER — PRIOR ORIGINAL RECORDS (OUTPATIENT)
Dept: OTHER | Age: 67
End: 2017-07-24

## 2017-08-01 LAB
ALBUMIN: 3.5 G/DL
ALKALINE PHOSPHATATE(ALK PHOS): 67 IU/L
ALT (SGPT): 26 U/L
AST (SGOT): 23 U/L
BILIRUBIN TOTAL: 0.6 MG/DL
BUN: 15 MG/DL
CALCIUM: 9.6 MG/DL
CHLORIDE: 105 MEQ/L
CHOLESTEROL, TOTAL: 150 MG/DL
CREATININE, SERUM: 0.88 MG/DL
GLUCOSE: 77 MG/DL
GLUCOSE: 77 MG/DL
HDL CHOLESTEROL: 87 MG/DL
HEMATOCRIT: 37.3 %
HEMOGLOBIN A1C: 5.4 %
HEMOGLOBIN: 12.8 G/DL
LDL CHOLESTEROL: 55 MG/DL
MAGNESIUM: 1.8 MG/DL
PLATELETS: 294 K/UL
POTASSIUM, SERUM: 4.4 MEQ/L
PROTEIN, TOTAL: 7.2 G/DL
RED BLOOD COUNT: 4.24 X 10-6/U
SGOT (AST): 23 IU/L
SGPT (ALT): 26 IU/L
SODIUM: 143 MEQ/L
THYROID STIMULATING HORMONE: 4.87 MLU/L
TRIGLYCERIDES: 40 MG/DL
WHITE BLOOD COUNT: 8 X 10-3/U

## 2017-08-04 ENCOUNTER — MED REC SCAN ONLY (OUTPATIENT)
Dept: INTERNAL MEDICINE CLINIC | Facility: CLINIC | Age: 67
End: 2017-08-04

## 2017-08-04 NOTE — PROGRESS NOTES
Received consult from [address] [place] [address]: 7/24/17   Placed on JV bin to review and sign

## 2017-08-14 RX ORDER — LEVOTHYROXINE SODIUM 0.03 MG/1
TABLET ORAL
Qty: 90 TABLET | Refills: 0 | Status: SHIPPED | OUTPATIENT
Start: 2017-08-14 | End: 2017-08-24

## 2017-08-22 ENCOUNTER — PRIOR ORIGINAL RECORDS (OUTPATIENT)
Dept: OTHER | Age: 67
End: 2017-08-22

## 2017-08-22 ENCOUNTER — LAB ENCOUNTER (OUTPATIENT)
Dept: LAB | Facility: HOSPITAL | Age: 67
End: 2017-08-22
Attending: INTERNAL MEDICINE
Payer: MEDICARE

## 2017-08-22 DIAGNOSIS — I10 ESSENTIAL HYPERTENSION, MALIGNANT: Primary | ICD-10-CM

## 2017-08-22 DIAGNOSIS — E83.42 HYPOMAGNESEMIA: ICD-10-CM

## 2017-08-22 DIAGNOSIS — E87.6 HYPOKALEMIA: ICD-10-CM

## 2017-08-22 LAB
BUN BLD-MCNC: 13 MG/DL (ref 8–20)
CALCIUM BLD-MCNC: 9.6 MG/DL (ref 8.3–10.3)
CHLORIDE: 104 MMOL/L (ref 101–111)
CO2: 28 MMOL/L (ref 22–32)
CREAT BLD-MCNC: 0.88 MG/DL (ref 0.55–1.02)
GLUCOSE BLD-MCNC: 83 MG/DL (ref 70–99)
HAV IGM SER QL: 1.7 MG/DL (ref 1.7–3)
POTASSIUM SERPL-SCNC: 4.4 MMOL/L (ref 3.6–5.1)
SODIUM SERPL-SCNC: 138 MMOL/L (ref 136–144)

## 2017-08-22 PROCEDURE — 80048 BASIC METABOLIC PNL TOTAL CA: CPT

## 2017-08-22 PROCEDURE — 83735 ASSAY OF MAGNESIUM: CPT

## 2017-08-22 PROCEDURE — 36415 COLL VENOUS BLD VENIPUNCTURE: CPT

## 2017-08-23 LAB
BUN: 13 MG/DL
CALCIUM: 9.6 MG/DL
CHLORIDE: 104 MEQ/L
CREATININE, SERUM: 0.88 MG/DL
GLUCOSE: 83 MG/DL
MAGNESIUM: 1.7 MG/DL
POTASSIUM, SERUM: 4.4 MEQ/L
SODIUM: 138 MEQ/L

## 2017-08-24 ENCOUNTER — OFFICE VISIT (OUTPATIENT)
Dept: INTERNAL MEDICINE CLINIC | Facility: CLINIC | Age: 67
End: 2017-08-24

## 2017-08-24 VITALS
HEART RATE: 53 BPM | SYSTOLIC BLOOD PRESSURE: 122 MMHG | TEMPERATURE: 98 F | WEIGHT: 140.19 LBS | RESPIRATION RATE: 16 BRPM | BODY MASS INDEX: 27.52 KG/M2 | DIASTOLIC BLOOD PRESSURE: 72 MMHG | HEIGHT: 60 IN

## 2017-08-24 DIAGNOSIS — I10 ESSENTIAL HYPERTENSION: Primary | ICD-10-CM

## 2017-08-24 DIAGNOSIS — E03.9 ACQUIRED HYPOTHYROIDISM: ICD-10-CM

## 2017-08-24 PROCEDURE — 99213 OFFICE O/P EST LOW 20 MIN: CPT | Performed by: INTERNAL MEDICINE

## 2017-08-24 RX ORDER — LEVOTHYROXINE SODIUM 0.03 MG/1
TABLET ORAL
Qty: 90 TABLET | Refills: 2 | Status: SHIPPED | OUTPATIENT
Start: 2017-08-24 | End: 2018-07-26

## 2017-08-24 NOTE — PROGRESS NOTES
Patient presents with: Follow - Up: ROOM 8/ 6 month f/u      HPI:  Here for f/u htn, hypothyroid. Doing well taking meds, no se's. Feels well, mg level wnl, followed by cards. Review of Systems   No f/c/chest pain or sob. No cough.  No abd pain/n/v/d. Pulmonary/Chest: Effort normal and breath sounds normal. No respiratory distress. Abdominal: Soft. Bowel sounds are normal. Non tender, no masses, no organomegaly or hernias. Musculoskeletal: No edema  Lymphadenopathy: No cervical adenopathy.    Neurolo

## 2017-11-30 ENCOUNTER — TELEPHONE (OUTPATIENT)
Dept: INTERNAL MEDICINE CLINIC | Facility: CLINIC | Age: 67
End: 2017-11-30

## 2017-11-30 NOTE — TELEPHONE ENCOUNTER
Patient heard a squeaky noise with exhale a few times, concerned, feels a little short of breath, can take a deep breath without any issues. Pt denies fever, congestion,  pain, lightheadedness or dizziness.   Pt scheduled for appt with SD for 12/1/17 at 10

## 2017-11-30 NOTE — TELEPHONE ENCOUNTER
Last evening during the night when patient would exhale she would hear kind of a \"squeaky\" noise. Today she is a little bit short of breath and is wondering if she should be concerned.

## 2017-12-01 ENCOUNTER — OFFICE VISIT (OUTPATIENT)
Dept: INTERNAL MEDICINE CLINIC | Facility: CLINIC | Age: 67
End: 2017-12-01

## 2017-12-01 VITALS
DIASTOLIC BLOOD PRESSURE: 78 MMHG | SYSTOLIC BLOOD PRESSURE: 128 MMHG | WEIGHT: 141 LBS | RESPIRATION RATE: 16 BRPM | TEMPERATURE: 99 F | BODY MASS INDEX: 28 KG/M2 | HEART RATE: 60 BPM

## 2017-12-01 DIAGNOSIS — J06.9 ACUTE URI: Primary | ICD-10-CM

## 2017-12-01 PROCEDURE — 99213 OFFICE O/P EST LOW 20 MIN: CPT | Performed by: NURSE PRACTITIONER

## 2017-12-01 RX ORDER — INFLUENZA A VIRUS A/CHRISTCHURCH/16/2010 NIB-74 (H1N1) HEMAGGLUTININ ANTIGEN (PROPIOLACTONE INACTIVATED), INFLUENZA A VIRUS A/SWITZERLAND/9715293/2013, NIB-88 (H3N2) HEMAGGLUTININ ANTIGEN (PROPIOLACTONE INACTIVATED), INFLUENZA B VIRUS B/PHUKET/3073/2013 - WILD TYPE HEMAGGLUTININ ANTIGEN (PROPIOLACTONE INACTIVATED) 15; 15; 15 UG/.5ML; UG/.5ML; UG/.5ML
INJECTION, SUSPENSION INTRAMUSCULAR
Refills: 0 | COMMUNITY
Start: 2017-10-19 | End: 2017-12-01

## 2017-12-01 NOTE — PROGRESS NOTES
Opal Azar is a 69year old female. Patient presents with:  Breathing Problem: per patient has been having this problem x 2 days. Hears a noise when she exhales. No history of asthma, only bronchitis does not feel congested.  Just feels very dry and li per week     Comment: cage 1/19/17    Family History   Problem Relation Age of Onset   • Heart Attack Father    • Heart Disorder Mother    • Infectious Disease Maternal Grandmother    • Diabetes Brother    • Stroke Brother    • Cancer Brother    • Diabetes

## 2017-12-03 ENCOUNTER — HOSPITAL ENCOUNTER (EMERGENCY)
Facility: HOSPITAL | Age: 67
Discharge: HOME | End: 2017-12-03
Attending: EMERGENCY MEDICINE
Payer: MEDICARE

## 2017-12-03 ENCOUNTER — APPOINTMENT (OUTPATIENT)
Dept: GENERAL RADIOLOGY | Facility: HOSPITAL | Age: 67
End: 2017-12-03
Attending: EMERGENCY MEDICINE
Payer: MEDICARE

## 2017-12-03 VITALS
HEART RATE: 82 BPM | SYSTOLIC BLOOD PRESSURE: 132 MMHG | TEMPERATURE: 98 F | OXYGEN SATURATION: 97 % | RESPIRATION RATE: 16 BRPM | BODY MASS INDEX: 27 KG/M2 | WEIGHT: 140 LBS | DIASTOLIC BLOOD PRESSURE: 89 MMHG

## 2017-12-03 DIAGNOSIS — J98.01 ACUTE BRONCHOSPASM: ICD-10-CM

## 2017-12-03 DIAGNOSIS — J40 BRONCHITIS: Primary | ICD-10-CM

## 2017-12-03 PROCEDURE — 94640 AIRWAY INHALATION TREATMENT: CPT

## 2017-12-03 PROCEDURE — 80053 COMPREHEN METABOLIC PANEL: CPT | Performed by: EMERGENCY MEDICINE

## 2017-12-03 PROCEDURE — 99285 EMERGENCY DEPT VISIT HI MDM: CPT

## 2017-12-03 PROCEDURE — 85025 COMPLETE CBC W/AUTO DIFF WBC: CPT | Performed by: EMERGENCY MEDICINE

## 2017-12-03 PROCEDURE — 93005 ELECTROCARDIOGRAM TRACING: CPT

## 2017-12-03 PROCEDURE — 93010 ELECTROCARDIOGRAM REPORT: CPT

## 2017-12-03 PROCEDURE — 84484 ASSAY OF TROPONIN QUANT: CPT | Performed by: EMERGENCY MEDICINE

## 2017-12-03 PROCEDURE — 71010 XR CHEST AP PORTABLE  (CPT=71010): CPT | Performed by: EMERGENCY MEDICINE

## 2017-12-03 PROCEDURE — 36415 COLL VENOUS BLD VENIPUNCTURE: CPT

## 2017-12-03 RX ORDER — IPRATROPIUM BROMIDE AND ALBUTEROL SULFATE 2.5; .5 MG/3ML; MG/3ML
3 SOLUTION RESPIRATORY (INHALATION) ONCE
Status: COMPLETED | OUTPATIENT
Start: 2017-12-03 | End: 2017-12-03

## 2017-12-03 RX ORDER — ALBUTEROL SULFATE 90 UG/1
2 AEROSOL, METERED RESPIRATORY (INHALATION) EVERY 4 HOURS PRN
Qty: 1 INHALER | Refills: 0 | Status: SHIPPED | OUTPATIENT
Start: 2017-12-03 | End: 2018-02-16 | Stop reason: ALTCHOICE

## 2017-12-03 RX ORDER — POTASSIUM CHLORIDE 20 MEQ/1
40 TABLET, EXTENDED RELEASE ORAL ONCE
Status: DISCONTINUED | OUTPATIENT
Start: 2017-12-03 | End: 2017-12-03

## 2017-12-03 RX ORDER — POTASSIUM CHLORIDE 1.5 G/1.77G
20 POWDER, FOR SOLUTION ORAL 2 TIMES DAILY
Status: DISCONTINUED | OUTPATIENT
Start: 2017-12-03 | End: 2017-12-03

## 2017-12-03 RX ORDER — PREDNISONE 20 MG/1
40 TABLET ORAL DAILY
Qty: 10 TABLET | Refills: 0 | Status: SHIPPED | OUTPATIENT
Start: 2017-12-03 | End: 2017-12-08

## 2017-12-03 RX ORDER — ARIPIPRAZOLE 15 MG/1
40 TABLET ORAL ONCE
Status: COMPLETED | OUTPATIENT
Start: 2017-12-03 | End: 2017-12-03

## 2017-12-03 NOTE — ED PROVIDER NOTES
Patient Seen in: [facility] Emergency Department    History   Patient presents with:  Chest Pain Angina (cardiovascular)    Stated Complaint: hearing \"squeeky sound in chest\" when lying down    HPI    58-year-old female comes the hospital to compl Smokeless tobacco: Never Used                      Comment: I smoked about  less than 1 pack per month  Alcohol use: Yes           4.2 oz/week     Cans of beer: 7 per week     Comment: cage 1/19/17      Review of Systems    Positive for stated complain DIFFERENTIAL[[id number]]          Abnormal            Final result                 Please view results for these tests on the individual orders. EKG    Rate, intervals and axes as noted on EKG Report.   Rate: 90  Rhythm: Sinus Rhythm  Reading: Agreed

## 2017-12-03 NOTE — ED INITIAL ASSESSMENT (HPI)
Pt complaining of \"hearing squeeky sound in chest when lying down\". On ABT for URI started yesterday.

## 2017-12-04 ENCOUNTER — TELEPHONE (OUTPATIENT)
Dept: INTERNAL MEDICINE CLINIC | Facility: CLINIC | Age: 67
End: 2017-12-04

## 2017-12-04 NOTE — TELEPHONE ENCOUNTER
Pt saw SD on Friday and went to the [facility] ER on Sat. She is doing much better today. Was told Friday she should be good for the colonoscopy Thurs. She is calling to ask since she went to the ER Sat. Is it still ok to have it?  Please advise

## 2017-12-04 NOTE — TELEPHONE ENCOUNTER
Ok to proceed with colonscopy if she is feeling better from my perspective. Would be per GI / anesthesia to say she should not proceed.

## 2017-12-04 NOTE — TELEPHONE ENCOUNTER
Called pt to inform, per SD,Ok to proceed with colonscopy if she is feeling better from SD perspective. Advised to also confirm with GI / anesthesia to proceed. Pt verbalized understanding and agreed with POC.

## 2017-12-07 PROCEDURE — 88305 TISSUE EXAM BY PATHOLOGIST: CPT | Performed by: INTERNAL MEDICINE

## 2018-01-23 ENCOUNTER — LAB ENCOUNTER (OUTPATIENT)
Dept: LAB | Facility: HOSPITAL | Age: 68
End: 2018-01-23
Attending: INTERNAL MEDICINE
Payer: MEDICARE

## 2018-01-23 ENCOUNTER — PRIOR ORIGINAL RECORDS (OUTPATIENT)
Dept: OTHER | Age: 68
End: 2018-01-23

## 2018-01-23 DIAGNOSIS — E78.00 PURE HYPERCHOLESTEROLEMIA: Primary | ICD-10-CM

## 2018-01-23 DIAGNOSIS — I10 ESSENTIAL HYPERTENSION, MALIGNANT: ICD-10-CM

## 2018-01-23 LAB
ALBUMIN SERPL-MCNC: 3.3 G/DL (ref 3.5–4.8)
ALP LIVER SERPL-CCNC: 65 U/L (ref 55–142)
ALT SERPL-CCNC: 22 U/L (ref 14–54)
AST SERPL-CCNC: 21 U/L (ref 15–41)
BILIRUB SERPL-MCNC: 0.4 MG/DL (ref 0.1–2)
BUN BLD-MCNC: 16 MG/DL (ref 8–20)
CALCIUM BLD-MCNC: 8.7 MG/DL (ref 8.3–10.3)
CHLORIDE: 107 MMOL/L (ref 101–111)
CHOLEST SMN-MCNC: 154 MG/DL (ref ?–200)
CO2: 29 MMOL/L (ref 22–32)
CREAT BLD-MCNC: 0.88 MG/DL (ref 0.55–1.02)
GLUCOSE BLD-MCNC: 82 MG/DL (ref 70–99)
HAV IGM SER QL: 1.7 MG/DL (ref 1.7–3)
HDLC SERPL-MCNC: 79 MG/DL (ref 45–?)
HDLC SERPL: 1.95 {RATIO} (ref ?–4.44)
LDLC SERPL CALC-MCNC: 63 MG/DL (ref ?–130)
M PROTEIN MFR SERPL ELPH: 6.9 G/DL (ref 6.1–8.3)
NONHDLC SERPL-MCNC: 75 MG/DL (ref ?–130)
POTASSIUM SERPL-SCNC: 4.3 MMOL/L (ref 3.6–5.1)
SODIUM SERPL-SCNC: 141 MMOL/L (ref 136–144)
TRIGL SERPL-MCNC: 59 MG/DL (ref ?–150)
VLDLC SERPL CALC-MCNC: 12 MG/DL (ref 5–40)

## 2018-01-23 PROCEDURE — 80053 COMPREHEN METABOLIC PANEL: CPT

## 2018-01-23 PROCEDURE — 83735 ASSAY OF MAGNESIUM: CPT

## 2018-01-23 PROCEDURE — 80061 LIPID PANEL: CPT

## 2018-01-23 PROCEDURE — 36415 COLL VENOUS BLD VENIPUNCTURE: CPT

## 2018-01-26 ENCOUNTER — PRIOR ORIGINAL RECORDS (OUTPATIENT)
Dept: OTHER | Age: 68
End: 2018-01-26

## 2018-01-26 LAB
ALBUMIN: 3.3 G/DL
ALKALINE PHOSPHATATE(ALK PHOS): 65 IU/L
BILIRUBIN TOTAL: 0.4 MG/DL
BUN: 16 MG/DL
CALCIUM: 8.7 MG/DL
CHLORIDE: 107 MEQ/L
CHOLESTEROL, TOTAL: 154 MG/DL
CREATININE, SERUM: 0.88 MG/DL
GLUCOSE: 82 MG/DL
HDL CHOLESTEROL: 79 MG/DL
LDL CHOLESTEROL: 63 MG/DL
MAGNESIUM: 1.7 MG/DL
NON-HDL CHOLESTEROL: 75 MG/DL
POTASSIUM, SERUM: 4.3 MEQ/L
PROTEIN, TOTAL: 6.9 G/DL
SGOT (AST): 21 IU/L
SGPT (ALT): 22 IU/L
SODIUM: 141 MEQ/L
TOTAL CHOLESTEROL / HDL RATIO: 1.95 RATIO UN
TRIGLYCERIDES: 59 MG/DL

## 2018-02-16 ENCOUNTER — OFFICE VISIT (OUTPATIENT)
Dept: INTERNAL MEDICINE CLINIC | Facility: CLINIC | Age: 68
End: 2018-02-16

## 2018-02-16 VITALS
RESPIRATION RATE: 12 BRPM | HEIGHT: 59 IN | WEIGHT: 143 LBS | HEART RATE: 72 BPM | DIASTOLIC BLOOD PRESSURE: 88 MMHG | SYSTOLIC BLOOD PRESSURE: 126 MMHG | TEMPERATURE: 98 F | BODY MASS INDEX: 28.83 KG/M2

## 2018-02-16 DIAGNOSIS — R90.82 WHITE MATTER ABNORMALITY ON MRI OF BRAIN: ICD-10-CM

## 2018-02-16 DIAGNOSIS — M85.89 OSTEOPENIA OF MULTIPLE SITES: ICD-10-CM

## 2018-02-16 DIAGNOSIS — Z00.00 MEDICARE ANNUAL WELLNESS VISIT, SUBSEQUENT: Primary | ICD-10-CM

## 2018-02-16 DIAGNOSIS — R73.9 HYPERGLYCEMIA: ICD-10-CM

## 2018-02-16 DIAGNOSIS — Z00.00 ENCOUNTER FOR ANNUAL HEALTH EXAMINATION: ICD-10-CM

## 2018-02-16 DIAGNOSIS — I10 ESSENTIAL HYPERTENSION: ICD-10-CM

## 2018-02-16 DIAGNOSIS — E03.9 ACQUIRED HYPOTHYROIDISM: ICD-10-CM

## 2018-02-16 PROBLEM — E87.6 HYPOKALEMIA: Status: RESOLVED | Noted: 2017-03-31 | Resolved: 2018-02-16

## 2018-02-16 PROCEDURE — G0439 PPPS, SUBSEQ VISIT: HCPCS | Performed by: INTERNAL MEDICINE

## 2018-02-16 RX ORDER — HYDROCHLOROTHIAZIDE 25 MG/1
25 TABLET ORAL DAILY
COMMUNITY

## 2018-02-16 RX ORDER — VALSARTAN 160 MG/1
160 TABLET ORAL DAILY
COMMUNITY
End: 2018-08-15 | Stop reason: ALTCHOICE

## 2018-02-16 NOTE — PATIENT INSTRUCTIONS
Lisa Malone's SCREENING SCHEDULE   Tests on this list are recommended by your physician but may not be covered, or covered at this frequency, by your insurer. Please check with your insurance carrier before scheduling to verify coverage.    PREVENTATIV following criteria:   • Men who are 73-68 years old and have smoked more than 100 cigarettes in their lifetime   • Anyone with a family history    Colorectal Cancer Screening  Covered up to Age 76     Colonoscopy Screen   Covered every 10 years- more often Annually No orders found for this or any previous visit.  Please get every year    Pneumococcal 13 (Prevnar)  Covered Once after 65   Orders placed or performed in visit on 01/12/16  -PNEUMOCOCCAL VACC, 13 MISAEL IM    Please get once after your 65th birthday

## 2018-02-16 NOTE — PROGRESS NOTES
HPI:   Inderjit Teixeira is a 69year old female who presents for a Medicare Subsequent Annual Wellness visit (Pt already had Initial Annual Wellness). Here for medicare well visit. Has htn, hypothyroid. Taking meds doing well, denies se's.    Her last an brain     Osteopenia     Hyperglycemia    Wt Readings from Last 3 Encounters:  02/16/18 : 143 lb  12/07/17 : 139 lb  12/03/17 : 140 lb     Last Cholesterol Labs:     Lab Results  Component Value Date   CHOLEST 154 01/23/2018   HDL 79 01/23/2018   LDL 63 01 colonoscopy (12/2017); and colonoscopy (N/A, 12/7/2017).     Her family history includes Cancer in her brother; Diabetes in her brother, maternal grandmother, and mother; Heart Attack in her father; Heart Disorder in her mother; Hypertension in her father a drainage or sinus tenderness   Throat: Lips, mucosa, and tongue normal; teeth and gums normal   Neck: Supple, symmetrical, trachea midline, no adenopathy;  thyroid: not enlarged, symmetric, no tenderness/mass/nodules; no carotid bruit or JVD   Back:   Symm without trying?: 2 - No  Has your appetite been poor?: No  How does the patient maintain a good energy level?: Daily Walks; Appropriate Exercise  How would you describe your daily physical activity?: Moderate  How would you describe your current health stat Maintenance if applicable    Chlamydia  Annually if high risk No results found for: CHLAMYDIA No flowsheet data found.     Screening Mammogram      Mammogram Annually to 76, then as discussed Mammogram,1 Yr due on 02/14/2018 Update Health Maintenance if senia FEMALE [[id number]]

## 2018-02-28 ENCOUNTER — TELEPHONE (OUTPATIENT)
Dept: INTERNAL MEDICINE CLINIC | Facility: CLINIC | Age: 68
End: 2018-02-28

## 2018-02-28 NOTE — TELEPHONE ENCOUNTER
Patient requests to speak with Dr Edith Mariee nurse because she's been tired lately. It seems like her heart beats faster at bedtime. She is out of breath. She takes a deep breath and there is an emptiness inside. She feels very week.   She has trouble

## 2018-03-01 NOTE — TELEPHONE ENCOUNTER
Called pt to inform, per AS, to F/U this week for further eval- scheduled OV on 3/2 w/ AD. Pt verbalized understanding and agreed with POC.   ALEXANDER

## 2018-03-02 ENCOUNTER — OFFICE VISIT (OUTPATIENT)
Dept: INTERNAL MEDICINE CLINIC | Facility: CLINIC | Age: 68
End: 2018-03-02

## 2018-03-02 ENCOUNTER — LAB ENCOUNTER (OUTPATIENT)
Dept: LAB | Age: 68
End: 2018-03-02
Attending: INTERNAL MEDICINE
Payer: MEDICARE

## 2018-03-02 VITALS
BODY MASS INDEX: 28.43 KG/M2 | OXYGEN SATURATION: 99 % | HEIGHT: 59 IN | DIASTOLIC BLOOD PRESSURE: 80 MMHG | TEMPERATURE: 98 F | WEIGHT: 141 LBS | SYSTOLIC BLOOD PRESSURE: 130 MMHG | HEART RATE: 68 BPM | RESPIRATION RATE: 16 BRPM

## 2018-03-02 DIAGNOSIS — R00.2 PALPITATIONS: Primary | ICD-10-CM

## 2018-03-02 DIAGNOSIS — I10 ESSENTIAL HYPERTENSION: ICD-10-CM

## 2018-03-02 DIAGNOSIS — E03.9 ACQUIRED HYPOTHYROIDISM: ICD-10-CM

## 2018-03-02 DIAGNOSIS — R00.2 PALPITATIONS: ICD-10-CM

## 2018-03-02 LAB
ALBUMIN SERPL-MCNC: 3.7 G/DL (ref 3.5–4.8)
ALP LIVER SERPL-CCNC: 67 U/L (ref 55–142)
ALT SERPL-CCNC: 23 U/L (ref 14–54)
AST SERPL-CCNC: 27 U/L (ref 15–41)
BILIRUB SERPL-MCNC: 0.6 MG/DL (ref 0.1–2)
BUN BLD-MCNC: 14 MG/DL (ref 8–20)
CALCIUM BLD-MCNC: 9.5 MG/DL (ref 8.3–10.3)
CHLORIDE: 103 MMOL/L (ref 101–111)
CO2: 27 MMOL/L (ref 22–32)
CREAT BLD-MCNC: 0.9 MG/DL (ref 0.55–1.02)
GLUCOSE BLD-MCNC: 74 MG/DL (ref 70–99)
M PROTEIN MFR SERPL ELPH: 7.7 G/DL (ref 6.1–8.3)
POTASSIUM SERPL-SCNC: 3.7 MMOL/L (ref 3.6–5.1)
SODIUM SERPL-SCNC: 137 MMOL/L (ref 136–144)
TSI SER-ACNC: 2.67 MIU/ML (ref 0.35–5.5)

## 2018-03-02 PROCEDURE — 84443 ASSAY THYROID STIM HORMONE: CPT

## 2018-03-02 PROCEDURE — 80053 COMPREHEN METABOLIC PANEL: CPT

## 2018-03-02 PROCEDURE — 93000 ELECTROCARDIOGRAM COMPLETE: CPT | Performed by: INTERNAL MEDICINE

## 2018-03-02 PROCEDURE — 99213 OFFICE O/P EST LOW 20 MIN: CPT | Performed by: INTERNAL MEDICINE

## 2018-03-02 NOTE — PROGRESS NOTES
Al Colbert  10/23/1950    Patient presents with: Follow - Up: AB RM 14, pt c/o racing heart beat walking up stairs, pounding in heart muscle sore, weak,X 1 week       SUBJECTIVE   Al Colbert is a 69year old female who presents with palpitations. • Hypothyroidism    • MVP (mitral valve prolapse)     blockage of carotid artery   • Unspecified essential hypertension       Patient Active Problem List:     HTN (hypertension)     Hypothyroid     White matter abnormality on MRI of brain     Osteopenia pulses. No murmur, rubs or gallops. No JVD. DP and PT pulses intact. Pulmonary/Chest: Effort normal and breath sounds normal. No respiratory distress. Musculoskeletal: No edema  Psychiatric: Normal mood and affect.      ASSESSMENT AND PLAN:   Vignesh Marcos

## 2018-05-27 ENCOUNTER — PRIOR ORIGINAL RECORDS (OUTPATIENT)
Dept: OTHER | Age: 68
End: 2018-05-27

## 2018-05-27 ENCOUNTER — HOSPITAL ENCOUNTER (EMERGENCY)
Facility: HOSPITAL | Age: 68
Discharge: HOME | End: 2018-05-27
Attending: EMERGENCY MEDICINE
Payer: MEDICARE

## 2018-05-27 VITALS
WEIGHT: 140 LBS | SYSTOLIC BLOOD PRESSURE: 127 MMHG | OXYGEN SATURATION: 97 % | BODY MASS INDEX: 28 KG/M2 | HEART RATE: 84 BPM | RESPIRATION RATE: 16 BRPM | DIASTOLIC BLOOD PRESSURE: 90 MMHG | TEMPERATURE: 98 F

## 2018-05-27 DIAGNOSIS — I49.1 PAC (PREMATURE ATRIAL CONTRACTION): ICD-10-CM

## 2018-05-27 DIAGNOSIS — R00.2 PALPITATIONS: Primary | ICD-10-CM

## 2018-05-27 PROCEDURE — 99284 EMERGENCY DEPT VISIT MOD MDM: CPT

## 2018-05-27 PROCEDURE — 93005 ELECTROCARDIOGRAM TRACING: CPT

## 2018-05-27 PROCEDURE — 93010 ELECTROCARDIOGRAM REPORT: CPT

## 2018-05-27 PROCEDURE — 80053 COMPREHEN METABOLIC PANEL: CPT | Performed by: EMERGENCY MEDICINE

## 2018-05-27 PROCEDURE — 84484 ASSAY OF TROPONIN QUANT: CPT | Performed by: EMERGENCY MEDICINE

## 2018-05-27 PROCEDURE — 85025 COMPLETE CBC W/AUTO DIFF WBC: CPT | Performed by: EMERGENCY MEDICINE

## 2018-05-27 PROCEDURE — 99285 EMERGENCY DEPT VISIT HI MDM: CPT

## 2018-05-27 PROCEDURE — 36415 COLL VENOUS BLD VENIPUNCTURE: CPT

## 2018-05-27 PROCEDURE — 84443 ASSAY THYROID STIM HORMONE: CPT | Performed by: EMERGENCY MEDICINE

## 2018-05-27 NOTE — ED PROVIDER NOTES
Patient Seen in: [facility] Emergency Department    History   Patient presents with:  Arrythmia/Palpitations (cardiovascular)    Stated Complaint: palpitations     HPI    38-year-old female with a history of fibroids, amenorrhea, hypothyroidism, his Location: [place] [address]: LAPAROSCOPY,PELVIC,BIOPSY  : TIKA NEEDLE LOCALIZATION W/ SPECIMEN 1 SITE LEFT  : TIKA NEEDLE LOCALIZATION W/ SPECIMEN 1 SITE RIG*  No date:   : OTHER SURGICAL HISTORY      Comment: 2-12 are intact. There is no focal motor deficit noted. Skin exam: Warm to touch. Good skin turgor. No lacerations, abrasions, lesions noted.     ED Course     Labs Reviewed   COMP METABOLIC PANEL (14) - Abnormal; Notable for the following:        Resul her cardiologist.  Will arrange for outpatient Holter monitor. Patient was brought back to the examination room immediately. The patient was then placed on a cardiac monitor and pulse oximetry was applied.   Patient had an IV established and labs were Amalia Bailey

## 2018-05-29 ENCOUNTER — HOSPITAL ENCOUNTER (OUTPATIENT)
Dept: CV DIAGNOSTICS | Facility: HOSPITAL | Age: 68
Discharge: HOME | End: 2018-05-29
Attending: EMERGENCY MEDICINE
Payer: MEDICARE

## 2018-05-29 ENCOUNTER — PRIOR ORIGINAL RECORDS (OUTPATIENT)
Dept: OTHER | Age: 68
End: 2018-05-29

## 2018-05-29 DIAGNOSIS — R00.2 PALPITATIONS: ICD-10-CM

## 2018-05-29 DIAGNOSIS — I49.1 PAC (PREMATURE ATRIAL CONTRACTION): ICD-10-CM

## 2018-05-29 PROCEDURE — 93225 XTRNL ECG REC<48 HRS REC: CPT | Performed by: EMERGENCY MEDICINE

## 2018-05-29 PROCEDURE — 93226 XTRNL ECG REC<48 HR SCAN A/R: CPT | Performed by: EMERGENCY MEDICINE

## 2018-05-29 PROCEDURE — 93227 XTRNL ECG REC<48 HR R&I: CPT | Performed by: EMERGENCY MEDICINE

## 2018-05-30 ENCOUNTER — OFFICE VISIT (OUTPATIENT)
Dept: INTERNAL MEDICINE CLINIC | Facility: CLINIC | Age: 68
End: 2018-05-30

## 2018-05-30 VITALS
DIASTOLIC BLOOD PRESSURE: 60 MMHG | SYSTOLIC BLOOD PRESSURE: 120 MMHG | HEART RATE: 64 BPM | HEIGHT: 59 IN | TEMPERATURE: 99 F | BODY MASS INDEX: 28.02 KG/M2 | WEIGHT: 139 LBS

## 2018-05-30 DIAGNOSIS — R00.2 PALPITATIONS: Primary | ICD-10-CM

## 2018-05-30 DIAGNOSIS — E03.9 ACQUIRED HYPOTHYROIDISM: ICD-10-CM

## 2018-05-30 DIAGNOSIS — I10 ESSENTIAL HYPERTENSION: ICD-10-CM

## 2018-05-30 DIAGNOSIS — R06.83 SNORING: ICD-10-CM

## 2018-05-30 PROCEDURE — 99214 OFFICE O/P EST MOD 30 MIN: CPT | Performed by: INTERNAL MEDICINE

## 2018-05-30 NOTE — PROGRESS NOTES
Patient presents with:  ER F/U: LG. Room 9. palpitations follow up. She was placed on a 24 hours holter monitor      HPI:  Here for palpitations that started on Saturday after being out in heat all day watching softball. Went to er, pac's on ekg, labs ok. Oriented to person, place, and time. No distress. HEENT:  Normocephalic and atraumatic. TM's wnl. Nose normal. Oropharynx is clear and moist.   Eyes: Conjunctivae wnl. Neck: Normal range of motion. Neck supple. Normal carotid pulses. No masses.   Cardio

## 2018-06-04 ENCOUNTER — TELEPHONE (OUTPATIENT)
Dept: INTERNAL MEDICINE CLINIC | Facility: CLINIC | Age: 68
End: 2018-06-04

## 2018-06-04 NOTE — TELEPHONE ENCOUNTER
Pt was told by triage to make a sooner appt than June 18th, with Dr. Gifty Stokes, triage nurse. But Dr. Allan Hand office wants her seen sooner. Needs SD to fax over the results of the EKG and Holter monitor report today, 6-4-18.    Pls Fax to [phone]  Pt

## 2018-06-05 ENCOUNTER — PRIOR ORIGINAL RECORDS (OUTPATIENT)
Dept: OTHER | Age: 68
End: 2018-06-05

## 2018-06-06 ENCOUNTER — MED REC SCAN ONLY (OUTPATIENT)
Dept: INTERNAL MEDICINE CLINIC | Facility: CLINIC | Age: 68
End: 2018-06-06

## 2018-06-07 ENCOUNTER — PRIOR ORIGINAL RECORDS (OUTPATIENT)
Dept: OTHER | Age: 68
End: 2018-06-07

## 2018-06-14 ENCOUNTER — OFFICE VISIT (OUTPATIENT)
Dept: SLEEP CENTER | Facility: HOSPITAL | Age: 68
End: 2018-06-14
Attending: INTERNAL MEDICINE
Payer: MEDICARE

## 2018-06-14 DIAGNOSIS — I10 ESSENTIAL HYPERTENSION: ICD-10-CM

## 2018-06-14 DIAGNOSIS — R06.83 SNORING: ICD-10-CM

## 2018-06-14 DIAGNOSIS — R00.2 PALPITATIONS: ICD-10-CM

## 2018-06-14 PROCEDURE — 95810 POLYSOM 6/> YRS 4/> PARAM: CPT

## 2018-06-22 LAB
ALBUMIN: 3.8 G/DL
ALT (SGPT): 21 U/L
AST (SGOT): 29 U/L
BILIRUBIN TOTAL: 0.5 MG/DL
BUN: 20 MG/DL
CALCIUM: 8.9 MG/DL
CHLORIDE: 102 MEQ/L
CREATININE, SERUM: 1.01 MG/DL
GLUCOSE: 107 MG/DL
GLUCOSE: 107 MG/DL
HEMATOCRIT: 37.8 %
HEMOGLOBIN: 12.9 G/DL
PLATELETS: 283 K/UL
POTASSIUM, SERUM: 3.8 MEQ/L
PROTEIN, TOTAL: 7.6 G/DL
RED BLOOD COUNT: 4.27 X 10-6/U
SGOT (AST): 29 IU/L
SGPT (ALT): 21 IU/L
SODIUM: 138 MEQ/L
THYROID STIMULATING HORMONE: 4.44 MLU/L
WHITE BLOOD COUNT: 7.3 X 10-3/U

## 2018-06-29 ENCOUNTER — OFFICE VISIT (OUTPATIENT)
Dept: INTERNAL MEDICINE CLINIC | Facility: CLINIC | Age: 68
End: 2018-06-29

## 2018-06-29 VITALS
BODY MASS INDEX: 26.62 KG/M2 | HEIGHT: 60.5 IN | HEART RATE: 56 BPM | DIASTOLIC BLOOD PRESSURE: 78 MMHG | TEMPERATURE: 98 F | WEIGHT: 139.19 LBS | SYSTOLIC BLOOD PRESSURE: 132 MMHG

## 2018-06-29 DIAGNOSIS — I10 ESSENTIAL HYPERTENSION: Primary | ICD-10-CM

## 2018-06-29 DIAGNOSIS — R00.2 PALPITATIONS: ICD-10-CM

## 2018-06-29 PROCEDURE — 99213 OFFICE O/P EST LOW 20 MIN: CPT | Performed by: INTERNAL MEDICINE

## 2018-06-29 RX ORDER — METOPROLOL SUCCINATE 25 MG/1
25 TABLET, EXTENDED RELEASE ORAL DAILY
Refills: 5 | COMMUNITY
Start: 2018-06-05

## 2018-06-29 NOTE — PROGRESS NOTES
Patient presents with: Follow - Up: cn room 9: follow up ER palpitaions, sleep study      HPI:  Here for f/u htn, palpitations. Improved on BB, notes some mild fatigue. Has sleep study, no result yet. Review of Systems   No f/c/chest pain or sob.  No c supple. Normal carotid pulses. No masses. Cardiovascular: Normal rate, regular rhythm and intact distal pulses. No murmur, rubs or gallops. Pulmonary/Chest: Effort normal and breath sounds normal. No respiratory distress. Abdominal: Soft.  Bowel sounds

## 2018-07-05 ENCOUNTER — TELEPHONE (OUTPATIENT)
Dept: INTERNAL MEDICINE CLINIC | Facility: CLINIC | Age: 68
End: 2018-07-05

## 2018-07-05 DIAGNOSIS — G47.33 OSA (OBSTRUCTIVE SLEEP APNEA): Primary | ICD-10-CM

## 2018-07-05 NOTE — PROCEDURES
[address]       Accredited by the [facility] of Sleep Medicine (AASM)    PATIENT'S NAME:        Maria A Kang  ATTENDING PHYSICIAN:   Jacqui Ruggiero M.D. REFERRING PHYSICIAN:   Lydia Martinez M.D.   SHANE wake after sleep onset time was noted. The sleep efficiency was 51%. 2.   The respiratory measurements data demonstrated mild obstructive sleep apnea/hypopnea with an AHI of 7 per hour.   Supine AHI could not be calculated since this body position was not

## 2018-07-09 NOTE — TELEPHONE ENCOUNTER
Patient notified sleep study results, notified to schedule CPAP Titration, faxed order to Billy Dunlap, confirmation received. Placed order for CPAP Titration. Pt verbalizes understanding.

## 2018-07-11 ENCOUNTER — OFFICE VISIT (OUTPATIENT)
Dept: SLEEP CENTER | Facility: HOSPITAL | Age: 68
End: 2018-07-11
Attending: INTERNAL MEDICINE
Payer: MEDICARE

## 2018-07-11 DIAGNOSIS — G47.33 OBSTRUCTIVE SLEEP APNEA (ADULT) (PEDIATRIC): ICD-10-CM

## 2018-07-11 PROCEDURE — 95811 POLYSOM 6/>YRS CPAP 4/> PARM: CPT

## 2018-07-20 NOTE — PROCEDURES
[address] [place] [address]       Accredited by the [facility] of Sleep Medicine (AASM)    PATIENT'S NAME:        Daryn Flowers  ATTENDING PHYSICIAN:   Kirstie Morton M.D.   REFERRING PHYSICIAN:   Izzy Olivarez M.D.  PA minutes. Sleep was fragmented by arousals due to idiopathic events. The amount of stage 1, or light sleep, was seen in normal amounts, comprising 33.7% of sleep. Slow-wave sleep was seen in increased amounts comprising 59.5% of sleep.   REM sleep was see

## 2018-07-23 ENCOUNTER — TELEPHONE (OUTPATIENT)
Dept: INTERNAL MEDICINE CLINIC | Facility: CLINIC | Age: 68
End: 2018-07-23

## 2018-07-23 DIAGNOSIS — G47.33 OSA (OBSTRUCTIVE SLEEP APNEA): Primary | ICD-10-CM

## 2018-07-26 ENCOUNTER — PRIOR ORIGINAL RECORDS (OUTPATIENT)
Dept: OTHER | Age: 68
End: 2018-07-26

## 2018-07-26 RX ORDER — LEVOTHYROXINE SODIUM 0.03 MG/1
TABLET ORAL
Qty: 90 TABLET | Refills: 1 | Status: SHIPPED | OUTPATIENT
Start: 2018-07-26 | End: 2019-01-23

## 2018-08-15 ENCOUNTER — MYAURORA ACCOUNT LINK (OUTPATIENT)
Dept: OTHER | Age: 68
End: 2018-08-15

## 2018-08-15 ENCOUNTER — OFFICE VISIT (OUTPATIENT)
Dept: OBGYN CLINIC | Facility: CLINIC | Age: 68
End: 2018-08-15
Payer: MEDICARE

## 2018-08-15 ENCOUNTER — PRIOR ORIGINAL RECORDS (OUTPATIENT)
Dept: OTHER | Age: 68
End: 2018-08-15

## 2018-08-15 VITALS — SYSTOLIC BLOOD PRESSURE: 140 MMHG | BODY MASS INDEX: 27 KG/M2 | WEIGHT: 139.38 LBS | DIASTOLIC BLOOD PRESSURE: 86 MMHG

## 2018-08-15 DIAGNOSIS — Z12.31 VISIT FOR SCREENING MAMMOGRAM: ICD-10-CM

## 2018-08-15 DIAGNOSIS — Z12.4 SCREENING FOR MALIGNANT NEOPLASM OF CERVIX: Primary | ICD-10-CM

## 2018-08-15 PROCEDURE — G0101 CA SCREEN;PELVIC/BREAST EXAM: HCPCS | Performed by: OBSTETRICS & GYNECOLOGY

## 2018-08-15 RX ORDER — OLMESARTAN MEDOXOMIL 20 MG/1
20 TABLET ORAL DAILY
COMMUNITY

## 2018-08-15 NOTE — PROGRESS NOTES
Annual GYN  No C/O, nothing new  Sexually active at times    ROS: No Cardiac, Respiratory, GI,  or Neurological symptoms.     PE:  GENERAL: well developed, well nourished, in no apparent distress  SKIN: no rashes, no suspicious lesions  HEENT: normal  NEC

## 2018-08-28 ENCOUNTER — MED REC SCAN ONLY (OUTPATIENT)
Dept: INTERNAL MEDICINE CLINIC | Facility: CLINIC | Age: 68
End: 2018-08-28

## 2018-08-30 ENCOUNTER — HOSPITAL ENCOUNTER (OUTPATIENT)
Dept: MAMMOGRAPHY | Facility: HOSPITAL | Age: 68
Discharge: HOME | End: 2018-08-30
Attending: OBSTETRICS & GYNECOLOGY
Payer: MEDICARE

## 2018-08-30 DIAGNOSIS — Z12.31 VISIT FOR SCREENING MAMMOGRAM: ICD-10-CM

## 2018-08-30 PROCEDURE — 77067 SCR MAMMO BI INCL CAD: CPT | Performed by: OBSTETRICS & GYNECOLOGY

## 2018-08-30 PROCEDURE — 77063 BREAST TOMOSYNTHESIS BI: CPT | Performed by: OBSTETRICS & GYNECOLOGY

## 2018-11-15 ENCOUNTER — OFFICE VISIT (OUTPATIENT)
Dept: INTERNAL MEDICINE CLINIC | Facility: CLINIC | Age: 68
End: 2018-11-15
Payer: MEDICARE

## 2018-11-15 VITALS
TEMPERATURE: 99 F | SYSTOLIC BLOOD PRESSURE: 110 MMHG | DIASTOLIC BLOOD PRESSURE: 72 MMHG | BODY MASS INDEX: 27.48 KG/M2 | WEIGHT: 140 LBS | HEART RATE: 64 BPM | HEIGHT: 60 IN

## 2018-11-15 DIAGNOSIS — I10 ESSENTIAL HYPERTENSION: ICD-10-CM

## 2018-11-15 DIAGNOSIS — G47.33 OSA ON CPAP: ICD-10-CM

## 2018-11-15 DIAGNOSIS — Z23 NEEDS FLU SHOT: Primary | ICD-10-CM

## 2018-11-15 DIAGNOSIS — Z99.89 OSA ON CPAP: ICD-10-CM

## 2018-11-15 PROCEDURE — G0008 ADMIN INFLUENZA VIRUS VAC: HCPCS | Performed by: INTERNAL MEDICINE

## 2018-11-15 PROCEDURE — 90653 IIV ADJUVANT VACCINE IM: CPT | Performed by: INTERNAL MEDICINE

## 2018-11-15 PROCEDURE — 99213 OFFICE O/P EST LOW 20 MIN: CPT | Performed by: INTERNAL MEDICINE

## 2018-11-15 NOTE — PROGRESS NOTES
Patient presents with: Follow - Up: LG. Room 6. HPI:  Here for htn, swati. Doing well, bp wnl. Having trouble with cpap, barely sleeping, seen by pulm, seeing cards in 2 weeks. Review of Systems   No f/c/chest pain or sob. No cough.  No abd pain/n/v normal. Oropharynx is clear and moist.   Eyes: Conjunctivae wnl. Neck: Normal range of motion. Neck supple. Normal carotid pulses. No masses. Cardiovascular: Normal rate, regular rhythm and intact distal pulses. No murmur, rubs or gallops.    Pulmonary/

## 2018-11-16 ENCOUNTER — LAB ENCOUNTER (OUTPATIENT)
Dept: LAB | Facility: HOSPITAL | Age: 68
End: 2018-11-16
Attending: INTERNAL MEDICINE
Payer: MEDICARE

## 2018-11-16 ENCOUNTER — PRIOR ORIGINAL RECORDS (OUTPATIENT)
Dept: OTHER | Age: 68
End: 2018-11-16

## 2018-11-16 DIAGNOSIS — I10 ESSENTIAL HYPERTENSION, MALIGNANT: ICD-10-CM

## 2018-11-16 DIAGNOSIS — I51.9 MYXEDEMA HEART DISEASE: Primary | ICD-10-CM

## 2018-11-16 DIAGNOSIS — E78.00 PURE HYPERCHOLESTEROLEMIA: ICD-10-CM

## 2018-11-16 DIAGNOSIS — E03.9 MYXEDEMA HEART DISEASE: Primary | ICD-10-CM

## 2018-11-16 DIAGNOSIS — R00.2 PALPITATIONS: ICD-10-CM

## 2018-11-16 PROCEDURE — 84443 ASSAY THYROID STIM HORMONE: CPT

## 2018-11-16 PROCEDURE — 80053 COMPREHEN METABOLIC PANEL: CPT

## 2018-11-16 PROCEDURE — 84439 ASSAY OF FREE THYROXINE: CPT

## 2018-11-16 PROCEDURE — 80061 LIPID PANEL: CPT

## 2018-11-16 PROCEDURE — 36415 COLL VENOUS BLD VENIPUNCTURE: CPT

## 2018-11-21 ENCOUNTER — PRIOR ORIGINAL RECORDS (OUTPATIENT)
Dept: OTHER | Age: 68
End: 2018-11-21

## 2018-11-21 LAB
ALBUMIN: 3.5 G/DL
ALKALINE PHOSPHATATE(ALK PHOS): 75 IU/L
ALT (SGPT): 18 U/L
AST (SGOT): 19 U/L
BILIRUBIN TOTAL: 0.7 MG/DL
BUN: 14 MG/DL
CALCIUM: 8.8 MG/DL
CHLORIDE: 105 MEQ/L
CHOLESTEROL, TOTAL: 152 MG/DL
CHOLESTEROL, TOTAL: 152 MG/DL
CREATININE, SERUM: 0.97 MG/DL
FREE T4: 1 MG/DL
GLOBULIN: 3.8 G/DL
GLUCOSE: 86 MG/DL
GLUCOSE: 86 MG/DL
HDL CHOLESTEROL: 68 MG/DL
HDL CHOLESTEROL: 68 MG/DL
LDL CHOLESTEROL: 67 MG/DL
LDL CHOLESTEROL: 67 MG/DL
NON-HDL CHOLESTEROL: 84 MG/DL
NON-HDL CHOLESTEROL: 84 MG/DL
POTASSIUM, SERUM: 3.7 MEQ/L
PROTEIN, TOTAL: 7.3 G/DL
SGOT (AST): 19 IU/L
SGPT (ALT): 18 IU/L
SODIUM: 138 MEQ/L
THYROID STIMULATING HORMONE: 1.53 MLU/L
TRIGLYCERIDES: 86 MG/DL
TRIGLYCERIDES: 86 MG/DL

## 2018-12-03 ENCOUNTER — MYAURORA ACCOUNT LINK (OUTPATIENT)
Dept: OTHER | Age: 68
End: 2018-12-03

## 2018-12-03 ENCOUNTER — PRIOR ORIGINAL RECORDS (OUTPATIENT)
Dept: OTHER | Age: 68
End: 2018-12-03

## 2019-01-23 RX ORDER — LEVOTHYROXINE SODIUM 0.03 MG/1
TABLET ORAL
Qty: 90 TABLET | Refills: 2 | Status: SHIPPED | OUTPATIENT
Start: 2019-01-23 | End: 2019-11-01

## 2019-02-28 VITALS
BODY MASS INDEX: 23.63 KG/M2 | HEIGHT: 64 IN | DIASTOLIC BLOOD PRESSURE: 72 MMHG | SYSTOLIC BLOOD PRESSURE: 136 MMHG | HEART RATE: 68 BPM | WEIGHT: 138.4 LBS

## 2019-02-28 VITALS
HEIGHT: 64 IN | HEART RATE: 72 BPM | BODY MASS INDEX: 23.73 KG/M2 | DIASTOLIC BLOOD PRESSURE: 70 MMHG | SYSTOLIC BLOOD PRESSURE: 106 MMHG | WEIGHT: 139 LBS

## 2019-02-28 VITALS
HEART RATE: 72 BPM | DIASTOLIC BLOOD PRESSURE: 64 MMHG | BODY MASS INDEX: 27.48 KG/M2 | WEIGHT: 140 LBS | SYSTOLIC BLOOD PRESSURE: 118 MMHG | HEIGHT: 60 IN

## 2019-04-01 RX ORDER — AMLODIPINE BESYLATE 5 MG/1
TABLET ORAL
COMMUNITY
Start: 2012-04-30 | End: 2019-06-03 | Stop reason: SDUPTHER

## 2019-04-01 RX ORDER — METOPROLOL SUCCINATE 25 MG/1
TABLET, EXTENDED RELEASE ORAL
COMMUNITY
Start: 2019-01-28 | End: 2019-07-22 | Stop reason: SDUPTHER

## 2019-04-01 RX ORDER — LEVOTHYROXINE SODIUM 0.03 MG/1
TABLET ORAL
COMMUNITY
Start: 2011-10-31 | End: 2019-06-03 | Stop reason: SDUPTHER

## 2019-04-01 RX ORDER — HYDROCHLOROTHIAZIDE 25 MG/1
TABLET ORAL
COMMUNITY
Start: 2019-01-21 | End: 2019-07-16 | Stop reason: SDUPTHER

## 2019-04-01 RX ORDER — ALPRAZOLAM 0.25 MG/1
TABLET ORAL
COMMUNITY
Start: 2011-10-31 | End: 2019-06-03 | Stop reason: SDUPTHER

## 2019-04-01 RX ORDER — OLMESARTAN MEDOXOMIL 20 MG/1
TABLET ORAL
COMMUNITY
Start: 2019-02-07 | End: 2019-08-15 | Stop reason: SDUPTHER

## 2019-05-22 ENCOUNTER — LAB ENCOUNTER (OUTPATIENT)
Dept: LAB | Facility: HOSPITAL | Age: 69
End: 2019-05-22
Attending: INTERNAL MEDICINE
Payer: MEDICARE

## 2019-05-22 DIAGNOSIS — I10 ESSENTIAL HYPERTENSION, MALIGNANT: ICD-10-CM

## 2019-05-22 DIAGNOSIS — E78.00 PURE HYPERCHOLESTEROLEMIA: Primary | ICD-10-CM

## 2019-05-22 DIAGNOSIS — E00.2 CONGENITAL IODINE-DEFICIENCY SYNDROME, MIXED TYPE: ICD-10-CM

## 2019-05-22 LAB
ALBUMIN SERPL-MCNC: 3.4 G/DL
ALBUMIN/GLOB SERPL: NORMAL {RATIO}
ALP SERPL-CCNC: 71 U/L
ALT SERPL-CCNC: 19 U/L
ANION GAP SERPL CALC-SCNC: NORMAL MMOL/L
AST SERPL-CCNC: 19 U/L
BILIRUB SERPL-MCNC: 0.4 MG/DL
BUN SERPL-MCNC: 12 MG/DL
BUN/CREAT SERPL: NORMAL
CALCIUM SERPL-MCNC: 9.2 MG/DL
CHLORIDE SERPL-SCNC: 106 MMOL/L
CHOLEST SERPL-MCNC: 153 MG/DL
CHOLEST/HDLC SERPL: NORMAL {RATIO}
CO2 SERPL-SCNC: NORMAL MMOL/L
CREAT SERPL-MCNC: 0.84 MG/DL
GLOBULIN SER-MCNC: 3.6 G/DL
GLUCOSE SERPL-MCNC: 83 MG/DL
HDLC SERPL-MCNC: 76 MG/DL
LDLC SERPL CALC-MCNC: 66 MG/DL
LENGTH OF FAST TIME PATIENT: NORMAL H
LENGTH OF FAST TIME PATIENT: NORMAL H
NONHDLC SERPL-MCNC: NORMAL MG/DL
POTASSIUM SERPL-SCNC: 4.4 MMOL/L
PROT SERPL-MCNC: 7 G/DL
SODIUM SERPL-SCNC: 138 MMOL/L
TRIGL SERPL-MCNC: 56 MG/DL
TSH SERPL-ACNC: 2.21 M[IU]/L
VLDLC SERPL CALC-MCNC: NORMAL MG/DL

## 2019-05-22 PROCEDURE — 80061 LIPID PANEL: CPT

## 2019-05-22 PROCEDURE — 80053 COMPREHEN METABOLIC PANEL: CPT

## 2019-05-22 PROCEDURE — 84443 ASSAY THYROID STIM HORMONE: CPT

## 2019-05-22 PROCEDURE — 36415 COLL VENOUS BLD VENIPUNCTURE: CPT

## 2019-05-24 ENCOUNTER — OFFICE VISIT (OUTPATIENT)
Dept: INTERNAL MEDICINE CLINIC | Facility: CLINIC | Age: 69
End: 2019-05-24
Payer: MEDICARE

## 2019-05-24 VITALS
SYSTOLIC BLOOD PRESSURE: 120 MMHG | WEIGHT: 138 LBS | HEIGHT: 60 IN | TEMPERATURE: 98 F | BODY MASS INDEX: 27.09 KG/M2 | HEART RATE: 60 BPM | DIASTOLIC BLOOD PRESSURE: 80 MMHG

## 2019-05-24 DIAGNOSIS — I10 ESSENTIAL HYPERTENSION: Primary | ICD-10-CM

## 2019-05-24 DIAGNOSIS — Z99.89 OSA ON CPAP: ICD-10-CM

## 2019-05-24 DIAGNOSIS — R90.82 WHITE MATTER ABNORMALITY ON MRI OF BRAIN: ICD-10-CM

## 2019-05-24 DIAGNOSIS — M85.89 OSTEOPENIA OF MULTIPLE SITES: ICD-10-CM

## 2019-05-24 DIAGNOSIS — R73.9 HYPERGLYCEMIA: ICD-10-CM

## 2019-05-24 DIAGNOSIS — G47.33 OSA ON CPAP: ICD-10-CM

## 2019-05-24 DIAGNOSIS — Z00.00 ENCOUNTER FOR ANNUAL HEALTH EXAMINATION: ICD-10-CM

## 2019-05-24 DIAGNOSIS — E03.9 ACQUIRED HYPOTHYROIDISM: ICD-10-CM

## 2019-05-24 PROCEDURE — G0439 PPPS, SUBSEQ VISIT: HCPCS | Performed by: INTERNAL MEDICINE

## 2019-05-24 NOTE — PATIENT INSTRUCTIONS
Lisa Malone's SCREENING SCHEDULE   Tests on this list are recommended by your physician but may not be covered, or covered at this frequency, by your insurer. Please check with your insurance carrier before scheduling to verify coverage.    PREVENTATIV who are 73-68 years old and have smoked more than 100 cigarettes in their lifetime   • Anyone with a family history    Colorectal Cancer Screening  Covered up to Age 76     Colonoscopy Screen   Covered every 10 years- more often if abnormal Colonoscopy due previous visit.  Please get every year    Pneumococcal 13 (Prevnar)  Covered Once after 65 Orders placed or performed in visit on 01/12/16   • PNEUMOCOCCAL VACC, 13 MISAEL IM    Please get once after your 65th birthday    Pneumococcal 23 (Pneumovax)  Covered O

## 2019-05-24 NOTE — PROGRESS NOTES
HPI:   Violette Oden is a 77year old female who presents for a Medicare Subsequent Annual Wellness visit (Pt already had Initial Annual Wellness). Here for awv. Doing well stable chronic conditions; taking meds no se's. Sees cards yearly.    Her last (GASTROENTEROLOGY)    Patient Active Problem List:     HTN (hypertension)     Hypothyroid     White matter abnormality on MRI of brain     Osteopenia     Hyperglycemia     AUDREY on CPAP    Wt Readings from Last 3 Encounters:  05/24/19 : 138 lb  11/15/18 : 14 (2002); other surgical history (1990); other surgical history (1989); other surgical history (2007); alex needle localization w/ specimen 1 site left (1139'J); alex needle localization w/ specimen 1 site right (4295'L); appendectomy; laparoscopy,pelvic,biops Appearance:  Alert, cooperative, no distress, appears stated age   Head:  Normocephalic, without obvious abnormality, atraumatic   Eyes:  PERRL, conjunctiva/corneas clear, EOM's intact both eyes   Ears:  Normal TM's and external ear canals, both ears   Nos observation  Hyperglycemia  seea marisol       Diet assessment: good     PLAN:  The patient indicates understanding of these issues and agrees to the plan. Reinforced healthy diet, lifestyle, and exercise. Return in 6 months (on 11/24/2019).      Emmie Posey Last Dexa Scan:   XR DEXA BONE DENSITOMETRY (CPT=77080) 01/14/2016    No flowsheet data found.     Pap and Pelvic      Pap: Every 3 yrs age 21-65 or Pap+HPV every 5 yrs age 33-67, age 72 and older at high risk There are no preventive care reminders to displ CREATININE (mg/dL)   Date Value   05/08/2014 0.65     Creatinine (mg/dL)   Date Value   05/22/2019 0.84    No flowsheet data found. Drug Serum Conc  Annually No results found for: DIGOXIN, DIG, VALP No flowsheet data found.                Template: ROSIE DUMONT

## 2019-05-29 ENCOUNTER — CLINICAL ABSTRACT (OUTPATIENT)
Dept: CARDIOLOGY | Age: 69
End: 2019-05-29

## 2019-06-02 PROBLEM — I49.1 PREMATURE ATRIAL CONTRACTIONS: Status: ACTIVE | Noted: 2018-06-05

## 2019-06-02 PROBLEM — R00.2 PALPITATIONS: Status: ACTIVE | Noted: 2018-06-05

## 2019-06-03 ENCOUNTER — OFFICE VISIT (OUTPATIENT)
Dept: CARDIOLOGY | Age: 69
End: 2019-06-03

## 2019-06-03 VITALS
HEIGHT: 59 IN | HEART RATE: 70 BPM | WEIGHT: 139 LBS | SYSTOLIC BLOOD PRESSURE: 122 MMHG | BODY MASS INDEX: 28.02 KG/M2 | DIASTOLIC BLOOD PRESSURE: 70 MMHG

## 2019-06-03 DIAGNOSIS — I10 HYPERTENSION, BENIGN: ICD-10-CM

## 2019-06-03 DIAGNOSIS — I49.1 PREMATURE ATRIAL CONTRACTIONS: ICD-10-CM

## 2019-06-03 DIAGNOSIS — Q23.1 BICUSPID AORTIC VALVE: Primary | ICD-10-CM

## 2019-06-03 DIAGNOSIS — E03.9 HYPOTHYROIDISM, UNSPECIFIED TYPE: ICD-10-CM

## 2019-06-03 DIAGNOSIS — Z82.49 FAMILY HISTORY OF CORONARY ARTERIOSCLEROSIS: ICD-10-CM

## 2019-06-03 DIAGNOSIS — E78.00 PURE HYPERCHOLESTEROLEMIA: ICD-10-CM

## 2019-06-03 DIAGNOSIS — R00.2 PALPITATIONS: ICD-10-CM

## 2019-06-03 DIAGNOSIS — R94.31 ABNORMAL EKG: ICD-10-CM

## 2019-06-03 PROCEDURE — 99214 OFFICE O/P EST MOD 30 MIN: CPT | Performed by: INTERNAL MEDICINE

## 2019-06-03 RX ORDER — LEVOTHYROXINE SODIUM 0.03 MG/1
TABLET ORAL
COMMUNITY
Start: 2019-01-23

## 2019-06-03 RX ORDER — DIPHENOXYLATE HYDROCHLORIDE AND ATROPINE SULFATE 2.5; .025 MG/1; MG/1
TABLET ORAL
COMMUNITY
Start: 2011-10-31

## 2019-06-03 RX ORDER — AMLODIPINE BESYLATE 5 MG/1
TABLET ORAL
Qty: 15 TABLET | Refills: 3 | Status: SHIPPED | OUTPATIENT
Start: 2019-06-03

## 2019-06-03 SDOH — HEALTH STABILITY: PHYSICAL HEALTH: ON AVERAGE, HOW MANY DAYS PER WEEK DO YOU ENGAGE IN MODERATE TO STRENUOUS EXERCISE (LIKE A BRISK WALK)?: 4 DAYS

## 2019-06-03 SDOH — HEALTH STABILITY: PHYSICAL HEALTH: ON AVERAGE, HOW MANY MINUTES DO YOU ENGAGE IN EXERCISE AT THIS LEVEL?: 90 MIN

## 2019-06-04 ENCOUNTER — MED REC SCAN ONLY (OUTPATIENT)
Dept: INTERNAL MEDICINE CLINIC | Facility: CLINIC | Age: 69
End: 2019-06-04

## 2019-06-28 ENCOUNTER — OFFICE VISIT (OUTPATIENT)
Dept: INTERNAL MEDICINE CLINIC | Facility: CLINIC | Age: 69
End: 2019-06-28
Payer: MEDICARE

## 2019-06-28 VITALS
BODY MASS INDEX: 26.9 KG/M2 | RESPIRATION RATE: 16 BRPM | WEIGHT: 137 LBS | HEIGHT: 60 IN | SYSTOLIC BLOOD PRESSURE: 134 MMHG | DIASTOLIC BLOOD PRESSURE: 82 MMHG | TEMPERATURE: 98 F | HEART RATE: 72 BPM

## 2019-06-28 DIAGNOSIS — E03.9 ACQUIRED HYPOTHYROIDISM: ICD-10-CM

## 2019-06-28 DIAGNOSIS — R10.84 GENERALIZED ABDOMINAL PAIN: Primary | ICD-10-CM

## 2019-06-28 DIAGNOSIS — I10 ESSENTIAL HYPERTENSION: ICD-10-CM

## 2019-06-28 DIAGNOSIS — Z90.49 HISTORY OF APPENDECTOMY: ICD-10-CM

## 2019-06-28 PROCEDURE — 99214 OFFICE O/P EST MOD 30 MIN: CPT | Performed by: INTERNAL MEDICINE

## 2019-06-28 RX ORDER — AMLODIPINE BESYLATE 5 MG/1
0.5 TABLET ORAL AS NEEDED
COMMUNITY
Start: 2019-06-03

## 2019-06-28 NOTE — PROGRESS NOTES
Simeon Noonan  10/23/1950    Patient presents with:  Abdominal Pain: SN Rm 1; x 2wks, umbilical area, LRQ, bloating, gassy, denies n/v/d, loose stools      SUBJECTIVE   Simeon Noonan is a 77year old female who presents with abdominal pain.     The patien Chew Tab Chew 2 tablets by mouth daily. Disp:  Rfl:    aspirin 81 MG Oral Tab Take 1 tablet by mouth daily. Disp:  Rfl: 0   Multiple Vitamins-Minerals (MULTIVITAMIN OR) Take  by mouth.  Disp:  Rfl:         Epinephrine             PALPITATIONS   Past Medical Types: 7 Standard drinks or equivalent per week      Frequency: 4 or more times a week      Drinks per session: 1 or 2      Binge frequency: Never      Comment: cage done 5-24-19    Drug use: No        OBJECTIVE:   /82 (BP Location: Right arm, Patien plan.    TODAY'S ORDERS     No orders of the defined types were placed in this encounter.       Meds & Refills:  Requested Prescriptions      No prescriptions requested or ordered in this encounter       Imaging & Consults:  CT ABDOMEN+PELVIS(CONTRAST ONLY)

## 2019-06-29 ENCOUNTER — HOSPITAL ENCOUNTER (OUTPATIENT)
Dept: CT IMAGING | Facility: HOSPITAL | Age: 69
Discharge: HOME | End: 2019-06-29
Attending: INTERNAL MEDICINE
Payer: MEDICARE

## 2019-06-29 DIAGNOSIS — R10.84 GENERALIZED ABDOMINAL PAIN: ICD-10-CM

## 2019-06-29 LAB — CREAT BLD-MCNC: 0.9 MG/DL (ref 0.55–1.02)

## 2019-06-29 PROCEDURE — 82565 ASSAY OF CREATININE: CPT

## 2019-06-29 PROCEDURE — 74177 CT ABD & PELVIS W/CONTRAST: CPT | Performed by: INTERNAL MEDICINE

## 2019-07-16 RX ORDER — HYDROCHLOROTHIAZIDE 25 MG/1
TABLET ORAL
Qty: 90 TABLET | Refills: 1 | Status: SHIPPED | OUTPATIENT
Start: 2019-07-16 | End: 2020-01-10 | Stop reason: SDUPTHER

## 2019-07-22 RX ORDER — METOPROLOL SUCCINATE 25 MG/1
TABLET, EXTENDED RELEASE ORAL
Qty: 45 TABLET | Refills: 1 | Status: SHIPPED | OUTPATIENT
Start: 2019-07-22 | End: 2020-01-10 | Stop reason: SDUPTHER

## 2019-08-15 RX ORDER — OLMESARTAN MEDOXOMIL 20 MG/1
TABLET ORAL
Qty: 30 TABLET | Refills: 6 | Status: SHIPPED | OUTPATIENT
Start: 2019-08-15 | End: 2020-02-25 | Stop reason: SDUPTHER

## 2019-08-16 ENCOUNTER — TELEPHONE (OUTPATIENT)
Dept: CARDIOLOGY | Age: 69
End: 2019-08-16

## 2019-10-23 ENCOUNTER — OFFICE VISIT (OUTPATIENT)
Dept: OBGYN CLINIC | Facility: CLINIC | Age: 69
End: 2019-10-23
Payer: MEDICARE

## 2019-10-23 VITALS
BODY MASS INDEX: 27.48 KG/M2 | HEART RATE: 73 BPM | HEIGHT: 60 IN | WEIGHT: 140 LBS | SYSTOLIC BLOOD PRESSURE: 124 MMHG | DIASTOLIC BLOOD PRESSURE: 80 MMHG

## 2019-10-23 DIAGNOSIS — Z01.419 WELL WOMAN EXAM WITH ROUTINE GYNECOLOGICAL EXAM: Primary | ICD-10-CM

## 2019-10-23 DIAGNOSIS — Z12.31 ENCOUNTER FOR SCREENING MAMMOGRAM FOR MALIGNANT NEOPLASM OF BREAST: ICD-10-CM

## 2019-10-23 DIAGNOSIS — Z12.39 SCREENING FOR MALIGNANT NEOPLASM OF BREAST: ICD-10-CM

## 2019-10-23 PROCEDURE — G0101 CA SCREEN;PELVIC/BREAST EXAM: HCPCS | Performed by: NURSE PRACTITIONER

## 2019-10-23 NOTE — PROGRESS NOTES
Al Colbert is a 68year old female  No LMP recorded (lmp unknown). Patient is postmenopausal. Patient presents with:  Physical: Annual   .  Pt denies any concerns at this time. Had normal Pap smear in 2016.  No further testing is indicated at [facility] Other surgical history  1990    Laser Surgery on uterus   • Other surgical history  1989    2 lumps removed from left breast/ 1 lump from right breast   • Other surgical history  2007    Meniscus trimmed   • Remv cartilage for graft nasal  1981       SOCIA times a week        Seat Belt: Yes          FAMILY HISTORY:  Family History   Problem Relation Age of Onset   • Heart Attack Father    • Hypertension Father            • Heart Disorder Mother    • Diabetes Mother            • Hypertension M history of anemia, easy bruising or bleeding.       PHYSICAL EXAM:   Constitutional: well developed, well nourished  Head/Face: normocephalic  Neck/Thyroid: thyroid symmetric, no thyromegaly, no nodules, no adenopathy  Lymphatic:no abnormal supraclavicular

## 2019-11-01 RX ORDER — LEVOTHYROXINE SODIUM 0.03 MG/1
TABLET ORAL
Qty: 90 TABLET | Refills: 1 | Status: SHIPPED | OUTPATIENT
Start: 2019-11-01 | End: 2020-04-20

## 2019-11-07 ENCOUNTER — HOSPITAL ENCOUNTER (OUTPATIENT)
Dept: MAMMOGRAPHY | Facility: HOSPITAL | Age: 69
Discharge: HOME | End: 2019-11-07
Attending: NURSE PRACTITIONER
Payer: MEDICARE

## 2019-11-07 DIAGNOSIS — Z12.39 SCREENING FOR MALIGNANT NEOPLASM OF BREAST: ICD-10-CM

## 2019-11-07 DIAGNOSIS — Z12.31 ENCOUNTER FOR SCREENING MAMMOGRAM FOR MALIGNANT NEOPLASM OF BREAST: ICD-10-CM

## 2019-11-07 PROCEDURE — 77063 BREAST TOMOSYNTHESIS BI: CPT | Performed by: NURSE PRACTITIONER

## 2019-11-07 PROCEDURE — 77067 SCR MAMMO BI INCL CAD: CPT | Performed by: NURSE PRACTITIONER

## 2019-11-08 ENCOUNTER — TELEPHONE (OUTPATIENT)
Dept: INTERNAL MEDICINE CLINIC | Facility: CLINIC | Age: 69
End: 2019-11-08

## 2019-11-15 ENCOUNTER — HOSPITAL ENCOUNTER (OUTPATIENT)
Dept: MAMMOGRAPHY | Facility: HOSPITAL | Age: 69
Discharge: HOME | End: 2019-11-15
Attending: NURSE PRACTITIONER
Payer: MEDICARE

## 2019-11-15 ENCOUNTER — TELEPHONE (OUTPATIENT)
Dept: OBGYN CLINIC | Facility: CLINIC | Age: 69
End: 2019-11-15

## 2019-11-15 DIAGNOSIS — R92.1 BREAST CALCIFICATIONS: Primary | ICD-10-CM

## 2019-11-15 DIAGNOSIS — R92.2 INCONCLUSIVE MAMMOGRAM: ICD-10-CM

## 2019-11-15 PROCEDURE — 77061 BREAST TOMOSYNTHESIS UNI: CPT | Performed by: NURSE PRACTITIONER

## 2019-11-15 PROCEDURE — 77065 DX MAMMO INCL CAD UNI: CPT | Performed by: NURSE PRACTITIONER

## 2019-11-15 NOTE — TELEPHONE ENCOUNTER
Spoke with Dr Ion Timmons of radiology on patient's mammogram. New calcifications in left breast. Needs stereotatic breast biopsy. She was made aware of this.

## 2019-11-15 NOTE — IMAGING NOTE
This Breast Care RN assisted Dr. Nina Mustafa with recommendation for a left breast 2 site stereotactic biopsy for calcifications. Procedure reviewed and all questions answered. Emotional and educational support given.    On the day of the biopsy, pt instructe

## 2019-11-18 NOTE — PROGRESS NOTES
Patient returned call. She is aware of results and recommendations. She will be scheduling for after Thanksgiving because she has a stress echo scheduled right before then that will require her to be on a treadmill without a bra on.

## 2019-11-22 ENCOUNTER — LAB ENCOUNTER (OUTPATIENT)
Dept: LAB | Facility: HOSPITAL | Age: 69
End: 2019-11-22
Attending: INTERNAL MEDICINE
Payer: MEDICARE

## 2019-11-22 ENCOUNTER — TELEPHONE (OUTPATIENT)
Dept: INTERNAL MEDICINE CLINIC | Facility: CLINIC | Age: 69
End: 2019-11-22

## 2019-11-22 DIAGNOSIS — I10 ESSENTIAL HYPERTENSION, MALIGNANT: Primary | ICD-10-CM

## 2019-11-22 DIAGNOSIS — E78.00 PURE HYPERCHOLESTEROLEMIA: ICD-10-CM

## 2019-11-22 LAB
ALBUMIN SERPL-MCNC: 3.4 G/DL
ALBUMIN/GLOB SERPL: NORMAL {RATIO}
ALP SERPL-CCNC: 66 U/L
ALT SERPL-CCNC: 16 U/L
ANION GAP SERPL CALC-SCNC: NORMAL MMOL/L
AST SERPL-CCNC: 21 U/L
BILIRUB SERPL-MCNC: 0.5 MG/DL
BUN SERPL-MCNC: 14 MG/DL
BUN/CREAT SERPL: NORMAL
CALCIUM SERPL-MCNC: 8.8 MG/DL
CHLORIDE SERPL-SCNC: 106 MMOL/L
CHOLEST SERPL-MCNC: 151 MG/DL
CHOLEST/HDLC SERPL: NORMAL {RATIO}
CO2 SERPL-SCNC: NORMAL MMOL/L
CREAT SERPL-MCNC: 0.88 MG/DL
GLOBULIN SER-MCNC: 3.6 G/DL
GLUCOSE SERPL-MCNC: 83 MG/DL
HDLC SERPL-MCNC: 77 MG/DL
LDLC SERPL CALC-MCNC: 63 MG/DL
LENGTH OF FAST TIME PATIENT: NORMAL H
LENGTH OF FAST TIME PATIENT: NORMAL H
NONHDLC SERPL-MCNC: NORMAL MG/DL
POTASSIUM SERPL-SCNC: 4.1 MMOL/L
PROT SERPL-MCNC: 7 G/DL
SODIUM SERPL-SCNC: 139 MMOL/L
TRIGL SERPL-MCNC: 53 MG/DL
VLDLC SERPL CALC-MCNC: NORMAL MG/DL

## 2019-11-22 PROCEDURE — 80053 COMPREHEN METABOLIC PANEL: CPT

## 2019-11-22 PROCEDURE — 36415 COLL VENOUS BLD VENIPUNCTURE: CPT

## 2019-11-22 PROCEDURE — 80061 LIPID PANEL: CPT

## 2019-11-22 NOTE — TELEPHONE ENCOUNTER
Pt was told to call AS and let him know she is having a breast bx on her L breast on 12/3/19 by Dr. Luis Chacko

## 2019-11-22 NOTE — TELEPHONE ENCOUNTER
11/15/19 Mammogram additional  Views:  FYI to AS. PROCEDURE:  [facility] RAMON 2D+3D DIAGNOSTIC ADDL VWS LEFT (CPT=77065/80296)        COMPARISON:  EDWARD , MG, [facility] RAMON 2D+3D SCREENING BILAT (CPT=77067/71920), 8/30/2018, 10:33.   [facility] RAMON 2D+3D SCREE

## 2019-11-26 ENCOUNTER — HOSPITAL ENCOUNTER (OUTPATIENT)
Dept: CV DIAGNOSTICS | Facility: HOSPITAL | Age: 69
Discharge: HOME | End: 2019-11-26
Attending: INTERNAL MEDICINE
Payer: MEDICARE

## 2019-11-26 ENCOUNTER — TELEPHONE (OUTPATIENT)
Dept: CARDIOLOGY | Age: 69
End: 2019-11-26

## 2019-11-26 DIAGNOSIS — R00.2 PALPITATIONS: ICD-10-CM

## 2019-11-26 DIAGNOSIS — Q23.1 BICUSPID AORTIC VALVE: ICD-10-CM

## 2019-11-26 DIAGNOSIS — R94.31 ABNORMAL EKG: ICD-10-CM

## 2019-11-26 DIAGNOSIS — I10 BENIGN HYPERTENSION: ICD-10-CM

## 2019-11-26 PROCEDURE — 93306 TTE W/DOPPLER COMPLETE: CPT | Performed by: INTERNAL MEDICINE

## 2019-11-29 ENCOUNTER — TELEPHONE (OUTPATIENT)
Dept: CARDIOLOGY | Age: 69
End: 2019-11-29

## 2019-12-03 ENCOUNTER — HOSPITAL ENCOUNTER (OUTPATIENT)
Dept: MAMMOGRAPHY | Facility: HOSPITAL | Age: 69
Discharge: HOME | End: 2019-12-03
Attending: NURSE PRACTITIONER
Payer: MEDICARE

## 2019-12-03 DIAGNOSIS — R92.1 BREAST CALCIFICATIONS: ICD-10-CM

## 2019-12-03 PROCEDURE — 19081 BX BREAST 1ST LESION STRTCTC: CPT | Performed by: NURSE PRACTITIONER

## 2019-12-03 PROCEDURE — 19082 BX BREAST ADD LESION STRTCTC: CPT | Performed by: NURSE PRACTITIONER

## 2019-12-03 PROCEDURE — 88360 TUMOR IMMUNOHISTOCHEM/MANUAL: CPT | Performed by: NURSE PRACTITIONER

## 2019-12-03 PROCEDURE — 88305 TISSUE EXAM BY PATHOLOGIST: CPT | Performed by: NURSE PRACTITIONER

## 2019-12-03 NOTE — IMAGING NOTE
SP 2 site stereo breast bx, pt had excessive bleeding from both sites. Hard pressure applied for 30 min by this RN. Pt was very anxious and worried about the bleeding. Much reassurance given. Bleeding did stop. Post bx mammogram completed without bleeding.

## 2019-12-04 ENCOUNTER — TELEPHONE (OUTPATIENT)
Dept: CT IMAGING | Facility: HOSPITAL | Age: 69
End: 2019-12-04

## 2019-12-04 NOTE — TELEPHONE ENCOUNTER
F/U call to check on pt SP 2 site stereo breast bx. Pt denies any bleeding after leaving [facility] yesterday. Reports being sore, but has been following discharge instructions and doing well. Encouraged her to continue good support for breasts, ice and tylenol.

## 2019-12-05 ENCOUNTER — NURSE NAVIGATOR ENCOUNTER (OUTPATIENT)
Dept: HEMATOLOGY/ONCOLOGY | Facility: HOSPITAL | Age: 69
End: 2019-12-05

## 2019-12-05 ENCOUNTER — TELEPHONE (OUTPATIENT)
Dept: MAMMOGRAPHY | Facility: HOSPITAL | Age: 69
End: 2019-12-05

## 2019-12-05 ENCOUNTER — HOSPITAL ENCOUNTER (EMERGENCY)
Facility: HOSPITAL | Age: 69
Discharge: HOME | End: 2019-12-05
Attending: EMERGENCY MEDICINE
Payer: MEDICARE

## 2019-12-05 ENCOUNTER — APPOINTMENT (OUTPATIENT)
Dept: ULTRASOUND IMAGING | Facility: HOSPITAL | Age: 69
End: 2019-12-05
Attending: NURSE PRACTITIONER
Payer: MEDICARE

## 2019-12-05 ENCOUNTER — TELEPHONE (OUTPATIENT)
Dept: INTERNAL MEDICINE CLINIC | Facility: CLINIC | Age: 69
End: 2019-12-05

## 2019-12-05 ENCOUNTER — TELEPHONE (OUTPATIENT)
Dept: OBGYN CLINIC | Facility: CLINIC | Age: 69
End: 2019-12-05

## 2019-12-05 VITALS
DIASTOLIC BLOOD PRESSURE: 75 MMHG | SYSTOLIC BLOOD PRESSURE: 138 MMHG | HEIGHT: 60 IN | WEIGHT: 137 LBS | HEART RATE: 72 BPM | TEMPERATURE: 98 F | OXYGEN SATURATION: 96 % | RESPIRATION RATE: 16 BRPM | BODY MASS INDEX: 26.9 KG/M2

## 2019-12-05 DIAGNOSIS — N64.89 HEMATOMA OF LEFT BREAST: Primary | ICD-10-CM

## 2019-12-05 PROCEDURE — 99284 EMERGENCY DEPT VISIT MOD MDM: CPT

## 2019-12-05 PROCEDURE — 36415 COLL VENOUS BLD VENIPUNCTURE: CPT

## 2019-12-05 PROCEDURE — 80053 COMPREHEN METABOLIC PANEL: CPT | Performed by: NURSE PRACTITIONER

## 2019-12-05 PROCEDURE — 76642 ULTRASOUND BREAST LIMITED: CPT | Performed by: NURSE PRACTITIONER

## 2019-12-05 PROCEDURE — 85025 COMPLETE CBC W/AUTO DIFF WBC: CPT | Performed by: NURSE PRACTITIONER

## 2019-12-05 RX ORDER — ACETAMINOPHEN 160 MG/5ML
15 SOLUTION ORAL ONCE
Status: COMPLETED | OUTPATIENT
Start: 2019-12-05 | End: 2019-12-05

## 2019-12-05 NOTE — PROGRESS NOTES
Spoke with patient regarding newly diagnosed breast cancer- DCIS. Discussed my role as breast program navigator and how I work with all of the physicians who may be involved in her care, possibly including surgery, medical oncology and radiation oncology.

## 2019-12-05 NOTE — ED INITIAL ASSESSMENT (HPI)
69 y/o female to ED with c/o of hematoma to left breast. Patient had breast biopsy 2 days ago, pressure dressing came off today and patient continues to bleed. Bleeding controlled to breast with patient applying pressure.  Patient reports pain to breast.

## 2019-12-05 NOTE — IMAGING NOTE
Called and spoke to Chioma Su at [facility], APRANGELINA's office for surgical recommendation for positive  breast biopsy result. Dr Nury Vernon has been recommended.

## 2019-12-05 NOTE — TELEPHONE ENCOUNTER
Patient called back. She has rescheduled her conflicting appt and would like to take the appt with Dr Monae Martinez on 12-9-19 at [facility] at [address] [place] [address] Pkwy notified.

## 2019-12-05 NOTE — TELEPHONE ENCOUNTER
Looking for the name of a breast surgeon to give the patient for positive biopsy results. Gave Dr Bell Rubio name.

## 2019-12-05 NOTE — TELEPHONE ENCOUNTER
Pt called to let AS know she was just diag with L breast ca-will be having surgery after the holidays-had a bx on tues-bleeding from the bx a lot even though she stopped aspirin 3 days prior but still had a lot of bleeding-had to lay in bed for 45 hours la

## 2019-12-05 NOTE — ED PROVIDER NOTES
Patient Seen in: [facility] Emergency Department      History   Patient presents with:  Postop/Procedure Problem    Stated Complaint: biopsy 2 days ago to left breast. took pressure dressing off today and now hema*    HPI  68year old female present 2 lumps removed from left breast/ 1 lump from right breast   • OTHER SURGICAL HISTORY  2007    Meniscus trimmed   • [facility] [facility] CARTILAGE FOR GRAFT NASAL  1981                    Social History    Tobacco Use      Smoking status: Never Smoker      Smokeless toba Capillary Refill: Capillary refill takes less than 2 seconds. Neurological:      Mental Status: She is alert and oriented to person, place, and time. Psychiatric:         Thought Content:  Thought content normal.             ED Course     Labs Review This report includes an Addendum and supersedes previous reports for this exam.    PROCEDURE:  [facility] BIOPSY STEREOTACTIC NODULE 2 SITE LEFT (CPT=19081/89151(X3))  COMPARISON:  EDWARD , MG, [facility] RAMON 2D+3D DIAGNOSTIC ADDL VWS LEFT (CPT=77065/97953), 11/15/2019 of the left breast demonstrated DCIS. Pathology for the stereotactic guided biopsy at the 2 o'clock position of the left breast demonstrated DCIS.  The results were relayed to the patient by the [place] [address] result clinic, the patient has b CONCLUSION:  1. Successful stereotactic guided biopsy of calcifications at the 1:00 and 2 o'clock position of the left breast. 2. Specimen radiograph demonstrating calcifications in the specimen.  3. Post procedure mammograms demonstrated that the appropria CONCLUSION:   1. There is been development of new calcifications at the 1:00 and  2 o'clock position of the left breast, these would be amenable to stereotactic guided biopsy. 2. The results were discussed with the patient at the time of interpretation.   D PROCEDURE:  [facility] RAMON 2D+3D SCREENING BILAT (CPT=77067/88356)  COMPARISON:  [facility] DIGITAL SCRN BILAT W/CAD (CPT=/77O52), 12/26/2014, 10:31. [facility] MCKEE MAM SCREENING BILAT (CPT=77067), 2/14/2017, 8:44.   [facility] M[facility] DIGITAL SCRN BILAT PROCEDURE:  US BREAST LEFT LIMITED (QUU=06182)  COMPARISON:  None.   INDICATIONS:  biopsy 2 days ago to left breast. took pressure dressing off today and now hematoma, bleeding, and swelling  TECHNIQUE:  Left breast ultrasound was performed, evaluating spec

## 2019-12-05 NOTE — TELEPHONE ENCOUNTER
Telephoned [address] and name,  verified with patient. Notified [address] [facility] [address] of positive DCIS left breast 2 site biopsy result. Concordance pending. [facility] reports biopsy site is healing well.  The patient was referred to Dr Ree Bernal and off

## 2019-12-06 PROBLEM — I49.1 PREMATURE ATRIAL CONTRACTIONS: Status: ACTIVE | Noted: 2018-06-05

## 2019-12-06 PROBLEM — R00.2 PALPITATIONS: Status: ACTIVE | Noted: 2018-06-05

## 2019-12-06 NOTE — TELEPHONE ENCOUNTER
Pt called back and stated that she forgot to ask if she can start taking her aspirin again     Please advise

## 2019-12-06 NOTE — TELEPHONE ENCOUNTER
Patient notified not to restart ASA yet until she has her appt with Dr Salome Pickett on Monday 12/9. Pt was taking the ASA on her own for preventative measures for cardiac issues, no one instructed her to take ASA. ALEXANDER to AS.

## 2019-12-06 NOTE — TELEPHONE ENCOUNTER
Patient notified AS aware and will follow closely. Pt states she ended back in the ER yesterday because the incision started to bleed again, US and labs performed, this am checked bx site while on the phone and no new bleeding today.   Pt has an appt with

## 2019-12-09 ENCOUNTER — APPOINTMENT (OUTPATIENT)
Dept: CARDIOLOGY | Age: 69
End: 2019-12-09

## 2019-12-09 ENCOUNTER — TELEPHONE (OUTPATIENT)
Dept: SURGERY | Facility: CLINIC | Age: 69
End: 2019-12-09

## 2019-12-09 ENCOUNTER — OFFICE VISIT (OUTPATIENT)
Dept: SURGERY | Facility: CLINIC | Age: 69
End: 2019-12-09
Payer: MEDICARE

## 2019-12-09 VITALS
RESPIRATION RATE: 20 BRPM | TEMPERATURE: 98 F | DIASTOLIC BLOOD PRESSURE: 88 MMHG | HEART RATE: 79 BPM | SYSTOLIC BLOOD PRESSURE: 148 MMHG

## 2019-12-09 DIAGNOSIS — Z01.818 PRE-OP TESTING: ICD-10-CM

## 2019-12-09 DIAGNOSIS — D05.12 BREAST NEOPLASM, TIS (DCIS), LEFT: Primary | ICD-10-CM

## 2019-12-09 DIAGNOSIS — R00.2 PALPITATIONS: ICD-10-CM

## 2019-12-09 DIAGNOSIS — D05.12 NEOPLASM OF LEFT BREAST, PRIMARY TUMOR STAGING CATEGORY TIS: DUCTAL CARCINOMA IN SITU (DCIS): Primary | ICD-10-CM

## 2019-12-09 PROCEDURE — 99205 OFFICE O/P NEW HI 60 MIN: CPT | Performed by: SURGERY

## 2019-12-09 NOTE — PATIENT INSTRUCTIONS
Surgeon: Dr Teodoro Garcia  [phone]  Fax [phone]  Procedure/Surgery: left breast wire bracketed lumpectomy    [address] [place], [address] [phone]  1.  Someone must accompany you the day of the procedure to drive

## 2019-12-09 NOTE — PROGRESS NOTES
Reviewed surgical instructions with patient and . Questions addressed. Reviewed what to expect day of surgery. Reviewed that she need EKG for pre op testing.

## 2019-12-10 ENCOUNTER — APPOINTMENT (OUTPATIENT)
Dept: LAB | Facility: HOSPITAL | Age: 69
End: 2019-12-10
Attending: SURGERY
Payer: MEDICARE

## 2019-12-10 DIAGNOSIS — Z01.818 PRE-OP TESTING: ICD-10-CM

## 2019-12-10 PROCEDURE — 93005 ELECTROCARDIOGRAM TRACING: CPT

## 2019-12-10 PROCEDURE — 93010 ELECTROCARDIOGRAM REPORT: CPT | Performed by: INTERNAL MEDICINE

## 2019-12-10 NOTE — PROGRESS NOTES
Breast Surgery New Patient Consultation    This is the first visit for this 68year old woman, referred by Dr. Jeanine Huber, who presents for evaluation of left breast DCIS.     History of Present Illness:   Ms. Donny Robison is a 68year old woman who presents wi debris).  repeat 5 yrs   • COLONOSCOPY, POSSIBLE BIOPSY, POSSIBLE POLYPECTOMY 70496 N/A 12/7/2017    Performed by Charlie Terry MD at [facility]   • [address]   • TIKA LOCALIZATION WIRE 1 SITE LEFT (CPT=19281)  9032'Y . She has 2 children. She is retired. Review of Systems:  General:   The patient denies, fever, chills, night sweats, fatigue, generalized weakness, change in appetite or weight loss.     HEENT:     The patient denies eye irritation, cataracts, pain.    Neuropsychiatric:  There is no history of migraines or severe headaches, seizure/epilepsy, speech problems, coordination problems, trembling/tremors, fainting/black outs, dizziness, memory problems, loss of sensation/numbness, problems walking, we is mildly nodular. There are no dominant masses in the breast. The axillary tail is normal.    Abdomen: The abdomen is soft, flat and non tender. The liver is not enlarged. There are no palpable masses. Lymph Nodes:   The supraclavicular, axillary and c as tamoxifen can be used in women with hormone sensitive disease in order to reduce the risk of local recurrence and future new primaries.     Following this discussion, where all of the patient's questions were answered, we agreed to proceed with left darrel

## 2019-12-11 ENCOUNTER — NURSE NAVIGATOR ENCOUNTER (OUTPATIENT)
Dept: HEMATOLOGY/ONCOLOGY | Facility: HOSPITAL | Age: 69
End: 2019-12-11

## 2019-12-11 ENCOUNTER — TELEPHONE (OUTPATIENT)
Dept: SURGERY | Facility: CLINIC | Age: 69
End: 2019-12-11

## 2019-12-11 RX ORDER — ACETAMINOPHEN 500 MG
1000 TABLET ORAL ONCE
Status: CANCELLED | OUTPATIENT
Start: 2019-12-11 | End: 2019-12-11

## 2019-12-11 NOTE — TELEPHONE ENCOUNTER
Returned patients call regarding her questions regarding taking liquid tylenol prior to surgery. Pt unavailable, talked to her , and let him know I would have to call her tomorrow after asking pre admission testing.

## 2019-12-11 NOTE — PROGRESS NOTES
LVM for patient to ensure that she has no other questions or concerns. Contact information provided.

## 2019-12-12 ENCOUNTER — TELEPHONE (OUTPATIENT)
Dept: SURGERY | Facility: CLINIC | Age: 69
End: 2019-12-12

## 2019-12-12 NOTE — TELEPHONE ENCOUNTER
Pt with questions about tylenol pre medication, she usually takes liquid. I let her know she can't take liquid if it's red. We had a discussion about breaking a tablet in half, or finding clear/white liquid tylenol to take.    If she can't, it's ok not to t

## 2019-12-13 ENCOUNTER — CLINICAL ABSTRACT (OUTPATIENT)
Dept: CARDIOLOGY | Age: 69
End: 2019-12-13

## 2019-12-16 ENCOUNTER — OFFICE VISIT (OUTPATIENT)
Dept: CARDIOLOGY | Age: 69
End: 2019-12-16

## 2019-12-16 VITALS
HEIGHT: 60 IN | HEART RATE: 70 BPM | DIASTOLIC BLOOD PRESSURE: 80 MMHG | SYSTOLIC BLOOD PRESSURE: 120 MMHG | WEIGHT: 138 LBS | BODY MASS INDEX: 27.09 KG/M2

## 2019-12-16 DIAGNOSIS — E78.00 PURE HYPERCHOLESTEROLEMIA: ICD-10-CM

## 2019-12-16 DIAGNOSIS — E03.9 HYPOTHYROIDISM, UNSPECIFIED TYPE: ICD-10-CM

## 2019-12-16 DIAGNOSIS — I10 HYPERTENSION, BENIGN: Primary | ICD-10-CM

## 2019-12-16 DIAGNOSIS — R94.31 ABNORMAL EKG: ICD-10-CM

## 2019-12-16 DIAGNOSIS — Z82.49 FAMILY HISTORY OF CORONARY ARTERIOSCLEROSIS: ICD-10-CM

## 2019-12-16 DIAGNOSIS — R00.2 PALPITATIONS: ICD-10-CM

## 2019-12-16 DIAGNOSIS — I49.1 PREMATURE ATRIAL CONTRACTIONS: ICD-10-CM

## 2019-12-16 PROCEDURE — 99214 OFFICE O/P EST MOD 30 MIN: CPT | Performed by: INTERNAL MEDICINE

## 2019-12-16 SDOH — HEALTH STABILITY: PHYSICAL HEALTH: ON AVERAGE, HOW MANY MINUTES DO YOU ENGAGE IN EXERCISE AT THIS LEVEL?: 60 MIN

## 2019-12-16 SDOH — HEALTH STABILITY: PHYSICAL HEALTH: ON AVERAGE, HOW MANY DAYS PER WEEK DO YOU ENGAGE IN MODERATE TO STRENUOUS EXERCISE (LIKE A BRISK WALK)?: 4 DAYS

## 2019-12-16 ASSESSMENT — PATIENT HEALTH QUESTIONNAIRE - PHQ9
SUM OF ALL RESPONSES TO PHQ9 QUESTIONS 1 AND 2: 0
2. FEELING DOWN, DEPRESSED OR HOPELESS: NOT AT ALL
1. LITTLE INTEREST OR PLEASURE IN DOING THINGS: NOT AT ALL
SUM OF ALL RESPONSES TO PHQ9 QUESTIONS 1 AND 2: 0

## 2019-12-17 ENCOUNTER — TELEPHONE (OUTPATIENT)
Dept: CT IMAGING | Facility: HOSPITAL | Age: 69
End: 2019-12-17

## 2019-12-17 NOTE — TELEPHONE ENCOUNTER
Spoke perla Montgomery regarding 2 site needle localization process of breast for lumpectomy scheduled for 12/20/19 with Dr. Poli Hawkins. Procedure explained and all questions answered. Pt to be transported via W/C through [facility] to [facility] in [place].  Pt darlyn

## 2019-12-18 ENCOUNTER — MED REC SCAN ONLY (OUTPATIENT)
Dept: INTERNAL MEDICINE CLINIC | Facility: CLINIC | Age: 69
End: 2019-12-18

## 2019-12-20 ENCOUNTER — ANESTHESIA EVENT (OUTPATIENT)
Dept: SURGERY | Facility: HOSPITAL | Age: 69
End: 2019-12-20
Payer: MEDICARE

## 2019-12-20 ENCOUNTER — HOSPITAL ENCOUNTER (OUTPATIENT)
Dept: MAMMOGRAPHY | Facility: HOSPITAL | Age: 69
Setting detail: HOSPITAL OUTPATIENT SURGERY
Discharge: HOME | End: 2019-12-20
Attending: SURGERY
Payer: MEDICARE

## 2019-12-20 ENCOUNTER — ANESTHESIA (OUTPATIENT)
Dept: SURGERY | Facility: HOSPITAL | Age: 69
End: 2019-12-20
Payer: MEDICARE

## 2019-12-20 ENCOUNTER — HOSPITAL ENCOUNTER (OUTPATIENT)
Facility: HOSPITAL | Age: 69
Setting detail: HOSPITAL OUTPATIENT SURGERY
Discharge: HOME | End: 2019-12-20
Attending: SURGERY | Admitting: SURGERY
Payer: MEDICARE

## 2019-12-20 VITALS
HEART RATE: 75 BPM | HEIGHT: 60 IN | TEMPERATURE: 98 F | WEIGHT: 136.69 LBS | DIASTOLIC BLOOD PRESSURE: 78 MMHG | BODY MASS INDEX: 26.84 KG/M2 | RESPIRATION RATE: 18 BRPM | SYSTOLIC BLOOD PRESSURE: 132 MMHG | OXYGEN SATURATION: 98 %

## 2019-12-20 DIAGNOSIS — D05.12 NEOPLASM OF LEFT BREAST, PRIMARY TUMOR STAGING CATEGORY TIS: DUCTAL CARCINOMA IN SITU (DCIS): ICD-10-CM

## 2019-12-20 PROCEDURE — 88305 TISSUE EXAM BY PATHOLOGIST: CPT | Performed by: SURGERY

## 2019-12-20 PROCEDURE — 0HBU0ZX EXCISION OF LEFT BREAST, OPEN APPROACH, DIAGNOSTIC: ICD-10-PCS | Performed by: SURGERY

## 2019-12-20 PROCEDURE — 88307 TISSUE EXAM BY PATHOLOGIST: CPT | Performed by: SURGERY

## 2019-12-20 PROCEDURE — 76098 X-RAY EXAM SURGICAL SPECIMEN: CPT

## 2019-12-20 PROCEDURE — 0HX5XZZ TRANSFER CHEST SKIN, EXTERNAL APPROACH: ICD-10-PCS | Performed by: SURGERY

## 2019-12-20 PROCEDURE — 19281 PERQ DEVICE BREAST 1ST IMAG: CPT | Performed by: SURGERY

## 2019-12-20 RX ORDER — LIDOCAINE HYDROCHLORIDE 10 MG/ML
INJECTION, SOLUTION EPIDURAL; INFILTRATION; INTRACAUDAL; PERINEURAL AS NEEDED
Status: DISCONTINUED | OUTPATIENT
Start: 2019-12-20 | End: 2019-12-20 | Stop reason: SURG

## 2019-12-20 RX ORDER — LIDOCAINE HYDROCHLORIDE AND EPINEPHRINE 10; 10 MG/ML; UG/ML
INJECTION, SOLUTION INFILTRATION; PERINEURAL AS NEEDED
Status: DISCONTINUED | OUTPATIENT
Start: 2019-12-20 | End: 2019-12-20 | Stop reason: HOSPADM

## 2019-12-20 RX ORDER — LABETALOL HYDROCHLORIDE 5 MG/ML
5 INJECTION, SOLUTION INTRAVENOUS EVERY 5 MIN PRN
Status: DISCONTINUED | OUTPATIENT
Start: 2019-12-20 | End: 2019-12-20

## 2019-12-20 RX ORDER — SODIUM CHLORIDE, SODIUM LACTATE, POTASSIUM CHLORIDE, CALCIUM CHLORIDE 600; 310; 30; 20 MG/100ML; MG/100ML; MG/100ML; MG/100ML
INJECTION, SOLUTION INTRAVENOUS CONTINUOUS
Status: DISCONTINUED | OUTPATIENT
Start: 2019-12-20 | End: 2019-12-20

## 2019-12-20 RX ORDER — HYDROMORPHONE HYDROCHLORIDE 1 MG/ML
0.4 INJECTION, SOLUTION INTRAMUSCULAR; INTRAVENOUS; SUBCUTANEOUS EVERY 5 MIN PRN
Status: DISCONTINUED | OUTPATIENT
Start: 2019-12-20 | End: 2019-12-20

## 2019-12-20 RX ORDER — NALOXONE HYDROCHLORIDE 0.4 MG/ML
80 INJECTION, SOLUTION INTRAMUSCULAR; INTRAVENOUS; SUBCUTANEOUS AS NEEDED
Status: DISCONTINUED | OUTPATIENT
Start: 2019-12-20 | End: 2019-12-20

## 2019-12-20 RX ORDER — ACETAMINOPHEN 160 MG/5ML
325 SOLUTION ORAL ONCE
Status: COMPLETED | OUTPATIENT
Start: 2019-12-20 | End: 2019-12-20

## 2019-12-20 RX ORDER — ACETAMINOPHEN 160 MG/5ML
1000 SOLUTION ORAL ONCE
Status: COMPLETED | OUTPATIENT
Start: 2019-12-20 | End: 2019-12-20

## 2019-12-20 RX ORDER — ACETAMINOPHEN 500 MG
1000 TABLET ORAL EVERY 6 HOURS PRN
COMMUNITY
End: 2019-12-27 | Stop reason: ALTCHOICE

## 2019-12-20 RX ORDER — DIAZEPAM 5 MG/1
5 TABLET ORAL AS NEEDED
Status: DISCONTINUED | OUTPATIENT
Start: 2019-12-20 | End: 2019-12-20 | Stop reason: HOSPADM

## 2019-12-20 RX ORDER — CEFAZOLIN SODIUM/WATER 2 G/20 ML
2 SYRINGE (ML) INTRAVENOUS ONCE
Status: COMPLETED | OUTPATIENT
Start: 2019-12-20 | End: 2019-12-20

## 2019-12-20 RX ORDER — DEXAMETHASONE SODIUM PHOSPHATE 4 MG/ML
VIAL (ML) INJECTION AS NEEDED
Status: DISCONTINUED | OUTPATIENT
Start: 2019-12-20 | End: 2019-12-20 | Stop reason: SURG

## 2019-12-20 RX ORDER — CEFAZOLIN SODIUM/WATER 2 G/20 ML
SYRINGE (ML) INTRAVENOUS
Status: DISCONTINUED
Start: 2019-12-20 | End: 2019-12-20

## 2019-12-20 RX ORDER — BUPIVACAINE HYDROCHLORIDE 5 MG/ML
INJECTION, SOLUTION EPIDURAL; INTRACAUDAL AS NEEDED
Status: DISCONTINUED | OUTPATIENT
Start: 2019-12-20 | End: 2019-12-20 | Stop reason: HOSPADM

## 2019-12-20 RX ORDER — HYDROCODONE BITARTRATE AND ACETAMINOPHEN 5; 325 MG/1; MG/1
1 TABLET ORAL AS NEEDED
Status: DISCONTINUED | OUTPATIENT
Start: 2019-12-20 | End: 2019-12-20

## 2019-12-20 RX ORDER — HYDROCODONE BITARTRATE AND ACETAMINOPHEN 5; 325 MG/1; MG/1
2 TABLET ORAL AS NEEDED
Status: DISCONTINUED | OUTPATIENT
Start: 2019-12-20 | End: 2019-12-20

## 2019-12-20 RX ORDER — MEPERIDINE HYDROCHLORIDE 25 MG/ML
12.5 INJECTION INTRAMUSCULAR; INTRAVENOUS; SUBCUTANEOUS AS NEEDED
Status: DISCONTINUED | OUTPATIENT
Start: 2019-12-20 | End: 2019-12-20

## 2019-12-20 RX ORDER — ONDANSETRON 2 MG/ML
4 INJECTION INTRAMUSCULAR; INTRAVENOUS AS NEEDED
Status: DISCONTINUED | OUTPATIENT
Start: 2019-12-20 | End: 2019-12-20

## 2019-12-20 RX ORDER — ONDANSETRON 2 MG/ML
INJECTION INTRAMUSCULAR; INTRAVENOUS AS NEEDED
Status: DISCONTINUED | OUTPATIENT
Start: 2019-12-20 | End: 2019-12-20 | Stop reason: SURG

## 2019-12-20 RX ORDER — HYDROCODONE BITARTRATE AND ACETAMINOPHEN 5; 325 MG/1; MG/1
1-2 TABLET ORAL EVERY 6 HOURS PRN
Qty: 30 TABLET | Refills: 0 | Status: SHIPPED | OUTPATIENT
Start: 2019-12-20 | End: 2019-12-27 | Stop reason: ALTCHOICE

## 2019-12-20 RX ADMIN — CEFAZOLIN SODIUM/WATER 2 G: 2 G/20 ML SYRINGE (ML) INTRAVENOUS at 14:54:00

## 2019-12-20 RX ADMIN — SODIUM CHLORIDE, SODIUM LACTATE, POTASSIUM CHLORIDE, CALCIUM CHLORIDE: 600; 310; 30; 20 INJECTION, SOLUTION INTRAVENOUS at 15:49:00

## 2019-12-20 RX ADMIN — SODIUM CHLORIDE, SODIUM LACTATE, POTASSIUM CHLORIDE, CALCIUM CHLORIDE: 600; 310; 30; 20 INJECTION, SOLUTION INTRAVENOUS at 14:49:00

## 2019-12-20 RX ADMIN — LIDOCAINE HYDROCHLORIDE 50 MG: 10 INJECTION, SOLUTION EPIDURAL; INFILTRATION; INTRACAUDAL; PERINEURAL at 14:52:00

## 2019-12-20 RX ADMIN — ONDANSETRON 4 MG: 2 INJECTION INTRAMUSCULAR; INTRAVENOUS at 15:37:00

## 2019-12-20 RX ADMIN — DEXAMETHASONE SODIUM PHOSPHATE 4 MG: 4 MG/ML VIAL (ML) INJECTION at 14:52:00

## 2019-12-20 NOTE — H&P
History of Present Illness:   Ms. Tori Skelton is a 68year old woman who presents with multifocal left breast DCIS. The patient denies any palpable masses, nipple discharge, skin changes or axillary symptoms.   She is a history of multiple cyst biopsies MD at [facility]   • [address]   • TIKA LOCALIZATION WIRE 1 SITE LEFT (CPT=19281)      • TIKA LOCALIZATION WIRE 1 SITE RIGHT (CPT=19281)      •        • OTHER SURGICAL HISTORY        Laser Surgery on u The patient denies, fever, chills, night sweats, fatigue, generalized weakness, change in appetite or weight loss.     HEENT:     The patient denies eye irritation, cataracts, redness, glaucoma, yellowing of the eyes, change in vision or color blindness. seizure/epilepsy, speech problems, coordination problems, trembling/tremors, fainting/black outs, dizziness, memory problems, loss of sensation/numbness, problems walking, weakness, tingling or burning in hands/feet.  There is no history of abusive relation axillary tail is normal.     Abdomen: The abdomen is soft, flat and non tender. The liver is not enlarged. There are no palpable masses.     Lymph Nodes:   The supraclavicular, axillary and cervical regions are free of significant lymphadenopathy.     Back disease in order to reduce the risk of local recurrence and future new primaries.     Following this discussion, where all of the patient's questions were answered, we agreed to proceed with left breast bracketed lumpectomy.  The risks and possible complica

## 2019-12-20 NOTE — ANESTHESIA PROCEDURE NOTES
Airway  Urgency: elective      General Information and Staff    Patient location during procedure: OR  Anesthesiologist: Jen Orosco MD  Resident/CRNA: Ngozi Wright CRNA  Performed: CRNA     Indications and Patient Condition  Indications for

## 2019-12-20 NOTE — ANESTHESIA PREPROCEDURE EVALUATION
PRE-OP EVALUATION    Patient Name: Donna Butler    Pre-op Diagnosis: Neoplasm of left breast, primary tumor staging category Tis: ductal carcinoma in situ (DCIS) [D05.12]    Procedure(s):  LEFT BREAST WIRE BRACKETED LUMPECTOMY    Surgeon(s) and Role: (six) hours as needed for Pain., Disp: 30 tablet, Rfl: 0  LEVOTHYROXINE SODIUM 25 MCG Oral Tab, TAKE 1 TABLET(25 MCG) BY MOUTH EVERY DAY, Disp: 90 tablet, Rfl: 1  Olmesartan Medoxomil 20 MG Oral Tab, Take 20 mg by mouth daily. , Disp: , Rfl:   Metoprolol Addison 2007    Meniscus trimmed   • [facility] [facility] CARTILAGE FOR GRAFT NASAL  1981     Social History    Tobacco Use      Smoking status: Former Smoker        Packs/day: 0.50        Years: 2.00        Pack years: 1        Types: Cigarettes        Quit date: 11/26/1970

## 2019-12-20 NOTE — IMAGING NOTE
Assisted Dr. Oswaldo Ponce with 2 site LB needle localization for breast for lumpectomy. Procedure explained and all questions answered. Pt verbalized understanding. Emotional support provided and pt tolerated procedure well with minimal discomfort.  Gauze dressin

## 2019-12-20 NOTE — ANESTHESIA POSTPROCEDURE EVALUATION
[address] [facility] [address] Patient Status:  Hospital Outpatient Surgery   Age/Gender 68year old female MRN [id number]   [facility] SURGERY Attending Joseline Saunders MD   Hosp Day # 0 PCP Matt Oden MD       Anesthesia Post-op N

## 2019-12-24 NOTE — OPERATIVE REPORT
[address]    PATIENT'S NAME: Yamilex DUMONT   ATTENDING PHYSICIAN: Maria C Aldridge. Missael Villalpando M.D. OPERATING PHYSICIAN: Maria C Aldridge. Missael Villalpando M.D.    PATIENT ACCOUNT#:   [de-identified]    LOCATION:  [address] [place] [address] 3 EDWP 10  MEDICAL RECORD #:   [id number] breast.  She was then brought to the operating room and placed in supine position. She was properly padded and secured. She was given a dose of IV antibiotics. Sequential compression devices were applied to her legs for DVT prophylaxis.   General anesthe with advancement flap mastopexy to allow for optimal wound healing and cosmesis.   This required a counterincision be placed in the immediate adjacent lateral breast parenchyma down to the level of pectoralis fascia, and via an inferior vascular pedicle, th

## 2019-12-27 ENCOUNTER — OFFICE VISIT (OUTPATIENT)
Dept: SURGERY | Facility: CLINIC | Age: 69
End: 2019-12-27
Payer: MEDICARE

## 2019-12-27 VITALS
OXYGEN SATURATION: 100 % | DIASTOLIC BLOOD PRESSURE: 81 MMHG | HEART RATE: 73 BPM | RESPIRATION RATE: 16 BRPM | SYSTOLIC BLOOD PRESSURE: 144 MMHG

## 2019-12-27 DIAGNOSIS — D05.12 BREAST NEOPLASM, TIS (DCIS), LEFT: Primary | ICD-10-CM

## 2019-12-27 PROCEDURE — 99024 POSTOP FOLLOW-UP VISIT: CPT | Performed by: SURGERY

## 2019-12-28 NOTE — PROGRESS NOTES
Breast Surgery Post-Operative Visit    Diagnosis: Left breast DCIS status post lumpectomy in December 20, 2019. Stage: TisNxMx    Disease Status:  Surgical treatment complete, medical and radiation oncology consultations pending. History:    This 68 y Laterality Date   • APPENDECTOMY     • APPENDECTOMY  2002   • BREAST BIOPSY NEEDLE LOCALIZATION Left 12/20/2019    Performed by Sherill Peabody, MD at [facility] MAIN OR   • COLECTOMY  2002    8-9 inches removed from intestine @ [address] 1 tablet by mouth daily. , Disp: , Rfl: 0  Multiple Vitamins-Minerals (MULTIVITAMIN OR), Take by mouth as needed.   , Disp: , Rfl:         Allergies:      Epinephrine             PALPITATIONS    Family History:   Family History   Problem Relation Age of On[place] of present illness    Gastrointestinal:     There is no history of difficulty or pain with swallowing, reflux symptoms, vomiting, dark or bloody stools, constipation, yellowing of the skin, indigestion, nausea, change in bowel habits, diarrhea, abdominal p Plan:  I had a discussion with the Patient and Family Member regarding her breast exam. On exam today I found her to be healing well since her surgery with no signs of infection. She does have a resolving hematoma from her initial biopsy.   I personally re

## 2020-01-10 RX ORDER — HYDROCHLOROTHIAZIDE 25 MG/1
TABLET ORAL
Qty: 90 TABLET | Refills: 3 | Status: SHIPPED | OUTPATIENT
Start: 2020-01-10 | End: 2020-12-31

## 2020-01-10 RX ORDER — METOPROLOL SUCCINATE 25 MG/1
TABLET, EXTENDED RELEASE ORAL
Qty: 45 TABLET | Refills: 3 | Status: SHIPPED | OUTPATIENT
Start: 2020-01-10 | End: 2020-12-31

## 2020-01-13 NOTE — PROGRESS NOTES
Cancer Center Report of Consultation    Patient Name: Nicole Mccracken   YOB: 1950   Medical Record Number: [id number]   CSN: [id number]   Consulting Physician: Vamsi Henry MD  Referring Physician(s): Remi Phelps MD  Date of Consultat COLONOSCOPY, POSSIBLE BIOPSY, POSSIBLE POLYPECTOMY 67550 N/A 12/7/2017    Performed by Charlie Terry MD at [facility]   • [address]   • TIKA LOCALIZATION WIRE 1 SITE LEFT (CPT=19281)  1980's   • TIKA LOCALIZATION WIRE Take 25 mg by mouth daily. , Disp: , Rfl:   •  Cholecalciferol 1000 UNITS Oral Chew Tab, Chew 2 tablets by mouth daily. , Disp: , Rfl:   •  aspirin 81 MG Oral Tab, Take 1 tablet by mouth daily. , Disp: , Rfl: 0  •  Multiple Vitamins-Minerals (MULTIVITAMIN OR) (0.9 cm); 2 % of submitted tissue.         -Associated microcalcifications and fibrosis, no necrosis.  -No evidence of invasive component.     B.   Left breast calcifications 2:00, stereotactic 8 gauge needle core biopsies:  -Ductal carcinoma in situ (DCIS) Aromatase inhibitors that include but are not limited to vasomotor symptoms, arthralgia/myalgia, increased risk for osteoporosis/osteopenia and their adverse effect on lipid panel.  We discussed the importance of exercise, low fat diet and weight loss as we

## 2020-01-14 ENCOUNTER — OFFICE VISIT (OUTPATIENT)
Dept: HEMATOLOGY/ONCOLOGY | Facility: HOSPITAL | Age: 70
End: 2020-01-14
Attending: SURGERY
Payer: MEDICARE

## 2020-01-14 ENCOUNTER — OFFICE VISIT (OUTPATIENT)
Dept: SURGERY | Facility: CLINIC | Age: 70
End: 2020-01-14
Payer: MEDICARE

## 2020-01-14 VITALS
BODY MASS INDEX: 28.43 KG/M2 | TEMPERATURE: 98 F | HEART RATE: 70 BPM | SYSTOLIC BLOOD PRESSURE: 130 MMHG | RESPIRATION RATE: 18 BRPM | OXYGEN SATURATION: 100 % | HEIGHT: 59.02 IN | WEIGHT: 141 LBS | DIASTOLIC BLOOD PRESSURE: 77 MMHG

## 2020-01-14 VITALS
OXYGEN SATURATION: 100 % | DIASTOLIC BLOOD PRESSURE: 77 MMHG | RESPIRATION RATE: 18 BRPM | HEART RATE: 70 BPM | SYSTOLIC BLOOD PRESSURE: 130 MMHG | BODY MASS INDEX: 28.43 KG/M2 | WEIGHT: 141 LBS | TEMPERATURE: 98 F | HEIGHT: 59.02 IN

## 2020-01-14 DIAGNOSIS — Z78.0 POSTMENOPAUSAL: Primary | ICD-10-CM

## 2020-01-14 DIAGNOSIS — D05.12 DUCTAL CARCINOMA IN SITU (DCIS) OF LEFT BREAST: ICD-10-CM

## 2020-01-14 DIAGNOSIS — D05.12 BREAST NEOPLASM, TIS (DCIS), LEFT: Primary | ICD-10-CM

## 2020-01-14 PROCEDURE — 99024 POSTOP FOLLOW-UP VISIT: CPT | Performed by: SURGERY

## 2020-01-14 PROCEDURE — 99204 OFFICE O/P NEW MOD 45 MIN: CPT | Performed by: INTERNAL MEDICINE

## 2020-01-14 NOTE — PROGRESS NOTES
Breast Surgery Surveillance Visit    Diagnosis: Left breast DCIS status post lumpectomy in December 20, 2019. Stage: TisNxMx    Disease Status:  Surgical treatment complete, medical and radiation oncology consultations pending. History:    This 69 yea Date   • APPENDECTOMY     • APPENDECTOMY  2002   • BREAST BIOPSY NEEDLE LOCALIZATION Left 12/20/2019    Performed by Ashwin Aponte MD at [facility] MAIN OR   • COLECTOMY  2002    8-9 inches removed from intestine @ [facility]    • COLONOSCOPY  2012   • Sister 59        just finished chemo, had lumpectomy       She is not of Ashkenazi Zoroastrian ancestry.     Social History:    Alcohol use Yes         Smoking status: Former Smoker 0.50 Packs/day For 2.00 Years   Types: Cigarettes   Quit date: 11/26/1970   Smok in skin color or change in moles. Hematologic/Lymphatic:  The patient denies easily bruising or bleeding or persistent swollen glands or lymph nodes.      Musculoskeletal:  The patient denies muscle aches/pain, joint pain, stiff joints, neck pain, back nature of the DCIS. I will see her in 6 months for surveillance clinical exam following her first mammogram.  She was encouraged to contact the office with any questions or concerns prior to her next scheduled appointment.

## 2020-01-14 NOTE — PROGRESS NOTES
MD consult for LB DCIS. Here to discuss POC. S/p lumpectomy with Dr. Quiroz Dear on 12/20. Pt is scheduled with RT on 1/17.       Education Record    Learner:  Patient    Barriers / Limitations:  None   Comments:    Method:  Discussion   Comments:    General Top

## 2020-01-14 NOTE — PROGRESS NOTES
Pt feeling well. Occ pain comes and goes. No redness or fevers or chills. Saw Dr Melba Gunter this morning.

## 2020-01-14 NOTE — PATIENT INSTRUCTIONS
Please call [phone] ([phone]) with any questions or concerns Monday through Friday 8:00 to 4:30.     For after hours or weekends/holidays for emergent needs, [phone] will reach the on-call MD.

## 2020-01-16 NOTE — CONSULTS
[facility] Oncology New Consultation      Patient Name: George Pride   MRN: [id number]  PCP: Marcus Kim MD  Referring Physician: Greg Ward MD; Brian Randle MD    Diagnosis Site: left breast, upper outer quadrant  St Take 0.5 tablets by mouth as needed. • Olmesartan Medoxomil 20 MG Oral Tab Take 20 mg by mouth daily. • Metoprolol Succinate ER 25 MG Oral Tablet 24 Hr Take 25 mg by mouth daily.  1/2 tablet  5   • Cholecalciferol 1000 UNITS Oral Chew Tab Chew 2 tab is recovering well and plans to take endocrine therapy; she presents today for discussion of adjuvant RT. Plan: I discussed the above clinical situation with the patient and her  at the time of consultation.  I offered her a course of adjuvant hy

## 2020-01-17 ENCOUNTER — HOSPITAL ENCOUNTER (OUTPATIENT)
Dept: RADIATION ONCOLOGY | Facility: HOSPITAL | Age: 70
Discharge: HOME | End: 2020-01-17
Attending: INTERNAL MEDICINE
Payer: MEDICARE

## 2020-01-17 ENCOUNTER — OFFICE VISIT (OUTPATIENT)
Dept: INTERNAL MEDICINE CLINIC | Facility: CLINIC | Age: 70
End: 2020-01-17
Payer: MEDICARE

## 2020-01-17 VITALS
WEIGHT: 139 LBS | BODY MASS INDEX: 28.02 KG/M2 | SYSTOLIC BLOOD PRESSURE: 132 MMHG | RESPIRATION RATE: 16 BRPM | OXYGEN SATURATION: 96 % | HEART RATE: 62 BPM | HEIGHT: 59 IN | TEMPERATURE: 98 F | DIASTOLIC BLOOD PRESSURE: 88 MMHG

## 2020-01-17 VITALS
RESPIRATION RATE: 18 BRPM | SYSTOLIC BLOOD PRESSURE: 132 MMHG | TEMPERATURE: 98 F | HEART RATE: 68 BPM | BODY MASS INDEX: 28 KG/M2 | OXYGEN SATURATION: 97 % | WEIGHT: 140 LBS | DIASTOLIC BLOOD PRESSURE: 77 MMHG

## 2020-01-17 DIAGNOSIS — D05.12 DUCTAL CARCINOMA IN SITU (DCIS) OF LEFT BREAST: ICD-10-CM

## 2020-01-17 DIAGNOSIS — E78.00 PURE HYPERCHOLESTEROLEMIA: ICD-10-CM

## 2020-01-17 DIAGNOSIS — Z98.890 STATUS POST LEFT BREAST LUMPECTOMY: ICD-10-CM

## 2020-01-17 DIAGNOSIS — D05.12 DUCTAL CARCINOMA IN SITU (DCIS) OF LEFT BREAST: Primary | ICD-10-CM

## 2020-01-17 DIAGNOSIS — I10 ESSENTIAL HYPERTENSION: Primary | ICD-10-CM

## 2020-01-17 DIAGNOSIS — E03.9 ACQUIRED HYPOTHYROIDISM: ICD-10-CM

## 2020-01-17 PROCEDURE — 99214 OFFICE O/P EST MOD 30 MIN: CPT

## 2020-01-17 PROCEDURE — 99214 OFFICE O/P EST MOD 30 MIN: CPT | Performed by: INTERNAL MEDICINE

## 2020-01-17 NOTE — PROGRESS NOTES
Nursing Consultation Note  Patient: Al Colbert  YOB: 1950  Age: 68year old  Radiation Oncologist: Dr. Kiki Hennessy  Referring Physician: [email or web address]  Consult Date: 1/17/2020      Chemotherapy: n/a  Labs: R Allergies:    Epinephrine             PALPITATIONS    Current Outpatient Medications   Medication Sig Dispense Refill   • LEVOTHYROXINE SODIUM 25 MCG Oral Tab TAKE 1 TABLET(25 MCG) BY MOUTH EVERY DAY 90 tablet 1   • amLODIPine Besylate (NORVASC) 5 MG O recovered --nl tissue and predominately fecal debris).  repeat 5 yrs   • COLONOSCOPY, POSSIBLE BIOPSY, POSSIBLE POLYPECTOMY 27857 N/A 12/7/2017    Performed by Sabi Angelo MD at [facility]   • [address]   • [facility] LOC Not on file        Attends meetings of clubs or organizations: Not on file        Relationship status: Not on file      Intimate partner violence:        Fear of current or ex partner: Not on file        Emotionally abused: Not on file        Physically ab

## 2020-01-17 NOTE — PATIENT INSTRUCTIONS
-Please call our office with your decision regarding radiation therapy.     Once you decide to proceed we will schedule your planning or \"mapping\" scan    - Karina Montesinos as planned to discuss starting Anastrozole, results of DEXA scan    - IF

## 2020-01-17 NOTE — PROGRESS NOTES
Patient presents with: Follow - Up: DOE rm 8 6 month f/u to annual      HPI:  Here for f/u of htn, hypothyroid, high chol, all stable taking meds no se's.  Pt had lumpectomy for dcis left breast, starting estrogen therapy soon and considering xrt, seeing ba • Cholecalciferol 1000 UNITS Oral Chew Tab Chew 2 tablets by mouth daily. • aspirin 81 MG Oral Tab Take 1 tablet by mouth daily. 0   • Multiple Vitamins-Minerals (MULTIVITAMIN OR) Take by mouth as needed.            Physical Exam  /88 (BP Loca

## 2020-01-20 ENCOUNTER — HOSPITAL ENCOUNTER (OUTPATIENT)
Dept: BONE DENSITY | Age: 70
Discharge: HOME | End: 2020-01-20
Attending: INTERNAL MEDICINE
Payer: MEDICARE

## 2020-01-20 DIAGNOSIS — Z78.0 POSTMENOPAUSAL: ICD-10-CM

## 2020-01-20 PROCEDURE — 77080 DXA BONE DENSITY AXIAL: CPT | Performed by: INTERNAL MEDICINE

## 2020-01-21 ENCOUNTER — TELEPHONE (OUTPATIENT)
Dept: SURGERY | Facility: CLINIC | Age: 70
End: 2020-01-21

## 2020-01-22 ENCOUNTER — TELEPHONE (OUTPATIENT)
Dept: HEMATOLOGY/ONCOLOGY | Facility: HOSPITAL | Age: 70
End: 2020-01-22

## 2020-01-22 ENCOUNTER — TELEPHONE (OUTPATIENT)
Dept: RADIATION ONCOLOGY | Facility: HOSPITAL | Age: 70
End: 2020-01-22

## 2020-01-22 NOTE — TELEPHONE ENCOUNTER
Elodia Lowe MD  Edw Herminia Rayo Rns 7 minutes ago (4:17 PM)     And f/u with me in 3 months after starting    Routing comment       MD Boyd Luque Rns 8 minutes ago (4:16 PM)     Results released in my chart yesterday.  CARTER maldonado normal...

## 2020-01-22 NOTE — TELEPHONE ENCOUNTER
TC to patient to see if she has reached a decision regarding radiation therapy. Patient states she is leaning toward \"not having it\".  She states her biggest concerns are \"pulmonary fibrosis, & also possibility of having long term side effects like her s

## 2020-01-23 ENCOUNTER — TELEPHONE (OUTPATIENT)
Dept: SURGERY | Facility: CLINIC | Age: 70
End: 2020-01-23

## 2020-01-23 NOTE — TELEPHONE ENCOUNTER
Returned her call to let her know Dr Shiraz Okeefe is in agreement with Dr Sam Hardy, that it is reasonable to not do the radiation.    She does recommend a mammogram in 6 months, and an office visit with Dr Shiraz Okeefe after the mammogram.   I let her know the order is i

## 2020-01-24 ENCOUNTER — TELEPHONE (OUTPATIENT)
Dept: RADIATION ONCOLOGY | Facility: HOSPITAL | Age: 70
End: 2020-01-24

## 2020-01-24 NOTE — TELEPHONE ENCOUNTER
Received telephone call from patient to inform Dr. Governor Cox that she has decided against radiation therapy. Dr. Governor Cox informed via in basket.

## 2020-02-11 ENCOUNTER — TELEPHONE (OUTPATIENT)
Dept: HEMATOLOGY/ONCOLOGY | Facility: HOSPITAL | Age: 70
End: 2020-02-11

## 2020-02-25 RX ORDER — OLMESARTAN MEDOXOMIL 20 MG/1
TABLET ORAL
Qty: 90 TABLET | Refills: 3 | Status: SHIPPED | OUTPATIENT
Start: 2020-02-25 | End: 2021-02-02

## 2020-04-09 ENCOUNTER — TELEPHONE (OUTPATIENT)
Dept: HEMATOLOGY/ONCOLOGY | Facility: HOSPITAL | Age: 70
End: 2020-04-09

## 2020-04-09 NOTE — TELEPHONE ENCOUNTER
Patient is calling to see when she can get a follow up appointment in a few months with Dr. Ely Ortiz. LVM returning her call.    Let her know Dr Ely Ortiz would want a follow up 3 month after she started taking the medication   I let her know with the corona

## 2020-04-20 ENCOUNTER — VIRTUAL PHONE E/M (OUTPATIENT)
Dept: HEMATOLOGY/ONCOLOGY | Facility: HOSPITAL | Age: 70
End: 2020-04-20
Attending: INTERNAL MEDICINE
Payer: MEDICARE

## 2020-04-20 DIAGNOSIS — D05.12 DUCTAL CARCINOMA IN SITU (DCIS) OF LEFT BREAST: Primary | ICD-10-CM

## 2020-04-20 PROCEDURE — 99442 PHONE E/M BY PHYS 11-20 MIN: CPT | Performed by: INTERNAL MEDICINE

## 2020-04-20 RX ORDER — LEVOTHYROXINE SODIUM 0.03 MG/1
TABLET ORAL
Qty: 90 TABLET | Refills: 0 | Status: SHIPPED | OUTPATIENT
Start: 2020-04-20 | End: 2020-07-17

## 2020-04-20 NOTE — PROGRESS NOTES
Virtual Telephone Check-In    Victorina Barnett verbally consents to a Virtual/Telephone Check-In visit on 04/20/20. Patient understands and accepts financial responsibility for any deductible, co-insurance and/or co-pays associated with this service.     D amLODIPine Besylate (NORVASC) 5 MG Oral Tab Take 0.5 tablets by mouth as needed. • Olmesartan Medoxomil 20 MG Oral Tab Take 20 mg by mouth daily. • Metoprolol Succinate ER 25 MG Oral Tablet 24 Hr Take 25 mg by mouth daily.  1/2 tablet  5   • hydroch

## 2020-05-12 ENCOUNTER — TELEPHONE (OUTPATIENT)
Dept: HEMATOLOGY/ONCOLOGY | Facility: HOSPITAL | Age: 70
End: 2020-05-12

## 2020-05-18 ENCOUNTER — TELEPHONE (OUTPATIENT)
Dept: SURGERY | Facility: CLINIC | Age: 70
End: 2020-05-18

## 2020-05-18 NOTE — TELEPHONE ENCOUNTER
Pt called to clarify when her mammogram is due, she was informed the order is for August, and to call and schedule ASAP. We will then contact her with results, and will make an appointment accordingly. Pt verbalized understanding.

## 2020-05-26 NOTE — TELEPHONE ENCOUNTER
Encounter Date: 2020       History     Chief Complaint   Patient presents with    Chest Pain     shooting pain in left arm that comes and goes     Chief complaint is left armpit pain.  The patient complains of a shooting pain/dull achy pain left armpit lasting 15 min starting about 7:00 p.m. tonight.  It occurred while he was watching TV.  He has slight associated shortness of breath.  No associated palpitations syncope nausea vomiting weakness.  He states he had a similar episode last week lasting 15 min.  He does have a history of high cholesterol.  He denies any history of diabetes.  He is a nonsmoker.  Family history is mother  of breast cancer and had diabetes dad  in a drowning accident.  The patient states he had a negative stress test .  His blood pressure prior to coming here was 200/101.  He took enalapril 5 mg at 6:00 p.m. to help alleviate that.  He normally takes atenolol 50 mg at 11:00 a.m. for his blood pressure.  He is a patient of Dr. Mendez.  The patient took 2 baby aspirin prior to coming here.  We will given 2 baby aspirin here.        Review of patient's allergies indicates:   Allergen Reactions    No known drug allergies      Past Medical History:   Diagnosis Date    Gout     Hypertension     Kidney stone      Past Surgical History:   Procedure Laterality Date    adnoidectomy      APPENDECTOMY      COLON SURGERY      colon rescection benign polyp    tonsillectomy       Family History   Problem Relation Age of Onset    Hypertension Mother     Diabetes Mother     No Known Problems Father      Social History     Tobacco Use    Smoking status: Never Smoker    Smokeless tobacco: Never Used   Substance Use Topics    Alcohol use: No    Drug use: No     Review of Systems   Constitutional: Negative for chills and fever.   HENT: Negative for ear pain, rhinorrhea and sore throat.    Eyes: Negative for pain and visual disturbance.   Respiratory: Negative for cough and  Pt stating very active and work outs. Noted after walking up one flight of stairs noted increased pounding sensation in chest. SOB w/ exertion. Fatigued due to difficulty sleeping. Muscle soreness.  Changed BP med from Diovan to generic, Valsartan- unsure i shortness of breath.    Cardiovascular: Negative for chest pain and palpitations.   Gastrointestinal: Negative for abdominal pain, constipation, diarrhea, nausea and vomiting.   Genitourinary: Negative for dysuria, frequency, hematuria and urgency.   Musculoskeletal: Negative for back pain, joint swelling and myalgias.        Pain to the left armpit area   Skin: Negative for rash.   Neurological: Negative for dizziness, seizures, weakness and headaches.   Psychiatric/Behavioral: Negative for dysphoric mood. The patient is not nervous/anxious.        Physical Exam     Initial Vitals [05/25/20 2016]   BP Pulse Resp Temp SpO2   (!) 195/100 64 16 98.6 °F (37 °C) 98 %      MAP       --         Physical Exam    Nursing note and vitals reviewed.  Constitutional: He appears well-developed and well-nourished.   HENT:   Head: Normocephalic and atraumatic.   Eyes: Conjunctivae, EOM and lids are normal. Pupils are equal, round, and reactive to light.   Neck: Trachea normal. Neck supple. No thyroid mass present.   Cardiovascular: Normal rate, regular rhythm and normal heart sounds.   Pulmonary/Chest: Breath sounds normal. No respiratory distress.   Abdominal: Soft. Bowel sounds are normal. There is no tenderness.   Musculoskeletal: Normal range of motion.   Neurological: He is alert and oriented to person, place, and time. He has normal strength and normal reflexes. No cranial nerve deficit or sensory deficit.   Skin: Skin is warm and dry.   Psychiatric: He has a normal mood and affect. His speech is normal and behavior is normal. Judgment and thought content normal.         ED Course   Procedures  Labs Reviewed   COMPREHENSIVE METABOLIC PANEL - Abnormal; Notable for the following components:       Result Value    Sodium 135 (*)     Glucose 121 (*)     Anion Gap 5 (*)     All other components within normal limits   CBC W/ AUTO DIFFERENTIAL   B-TYPE NATRIURETIC PEPTIDE   TROPONIN I   PROTIME-INR     EKG Readings: (Independently  Interpreted)   Initial EKG shows bigeminy as well as T-wave inversion flattening in 1 in aVL and V3 V4 V5 V6 rate 52 CT interval 140 milliseconds     2nd EKG reveals no PVCs but T-wave inversions and flattening 1 in aVL V 345 and 6.  No ST elevation       Imaging Results          X-Ray Chest AP Portable (Final result)  Result time 05/25/20 20:32:00    Final result by Winnie Mcclain MD (05/25/20 20:32:00)                 Narrative:    EXAM DESCRIPTION:  XR CHEST AP PORTABLE    CLINICAL HISTORY:  64 years  Male  chest pain    COMPARISON:  8/22/2014    FINDINGS:    Cardiac size and mediastinal contour appear stable.  No new area of infiltrate noted. No pleural fluid or pneumothorax.        IMPRESSION:    No acute abnormality is identified.    Electronically signed by:  Winnie Mcclain MD  5/25/2020 9:38 PM CDT Workstation: [id number]                               Medical Decision Making:   Initial Assessment:   Chief complaint is left armpit pain.  Similar episode last week  Differential Diagnosis:   Differential diagnosis includes causes of life-threatening chest pain including aortic dissection, pericarditis, cardiac arrhythmia, acute MI.  Differential diagnosis includes also pulmonary embolus, pneumothorax, endocrine problems, cancer, musculoskeletal pain, among others  ED Management:  The patient will be admitted for cardiac ischemia along with atypical chest pain.  Hospitalist contacted at 9:50 p.m. case discussed and the patient will be admit                                 Clinical Impression:       ICD-10-CM ICD-9-CM   1. Chest pain R07.9 786.50             ED Disposition Condition    Admit                           Reva Cardona MD  05/25/20 [id number]

## 2020-07-14 ENCOUNTER — LAB ENCOUNTER (OUTPATIENT)
Dept: LAB | Facility: HOSPITAL | Age: 70
End: 2020-07-14
Attending: INTERNAL MEDICINE
Payer: MEDICARE

## 2020-07-14 DIAGNOSIS — R69 DIAGNOSIS UNKNOWN: Primary | ICD-10-CM

## 2020-07-14 DIAGNOSIS — I10 ESSENTIAL HYPERTENSION, MALIGNANT: ICD-10-CM

## 2020-07-14 DIAGNOSIS — E78.00 PURE HYPERCHOLESTEROLEMIA: ICD-10-CM

## 2020-07-14 LAB
ALBUMIN SERPL-MCNC: 3.3 G/DL (ref 3.4–5)
ALBUMIN/GLOB SERPL: 0.9 {RATIO} (ref 1–2)
ALP LIVER SERPL-CCNC: 70 U/L (ref 55–142)
ALT SERPL-CCNC: 17 U/L (ref 13–56)
ANION GAP SERPL CALC-SCNC: 4 MMOL/L (ref 0–18)
AST SERPL-CCNC: 17 U/L (ref 15–37)
BILIRUB SERPL-MCNC: 0.5 MG/DL (ref 0.1–2)
BUN BLD-MCNC: 15 MG/DL (ref 7–18)
BUN/CREAT SERPL: 16 (ref 10–20)
CALCIUM BLD-MCNC: 8.8 MG/DL (ref 8.5–10.1)
CHLORIDE SERPL-SCNC: 108 MMOL/L (ref 98–112)
CHOLEST SMN-MCNC: 150 MG/DL (ref ?–200)
CO2 SERPL-SCNC: 27 MMOL/L (ref 21–32)
CREAT BLD-MCNC: 0.94 MG/DL (ref 0.55–1.02)
GLOBULIN PLAS-MCNC: 3.8 G/DL (ref 2.8–4.4)
GLUCOSE BLD-MCNC: 81 MG/DL (ref 70–99)
HDLC SERPL-MCNC: 71 MG/DL (ref 40–59)
LDLC SERPL CALC-MCNC: 67 MG/DL (ref ?–100)
M PROTEIN MFR SERPL ELPH: 7.1 G/DL (ref 6.4–8.2)
NONHDLC SERPL-MCNC: 79 MG/DL (ref ?–130)
OSMOLALITY SERPL CALC.SUM OF ELEC: 288 MOSM/KG (ref 275–295)
POTASSIUM SERPL-SCNC: 3.7 MMOL/L (ref 3.5–5.1)
SODIUM SERPL-SCNC: 139 MMOL/L (ref 136–145)
TRIGL SERPL-MCNC: 61 MG/DL (ref 30–149)
VLDLC SERPL CALC-MCNC: 12 MG/DL (ref 0–30)

## 2020-07-14 PROCEDURE — 80061 LIPID PANEL: CPT

## 2020-07-14 PROCEDURE — 36415 COLL VENOUS BLD VENIPUNCTURE: CPT

## 2020-07-14 PROCEDURE — 80053 COMPREHEN METABOLIC PANEL: CPT

## 2020-07-15 RX ORDER — ANASTROZOLE 1 MG/1
1 TABLET ORAL DAILY
COMMUNITY
Start: 2020-01-22

## 2020-07-17 RX ORDER — LEVOTHYROXINE SODIUM 0.03 MG/1
TABLET ORAL
Qty: 90 TABLET | Refills: 0 | Status: SHIPPED | OUTPATIENT
Start: 2020-07-17 | End: 2020-10-02

## 2020-07-17 NOTE — TELEPHONE ENCOUNTER
Protocol failed due to TSH value between 0.350 and 5.500 IU/ml,TSH test in past 12 months    Please advise,    LOV:1/17/20 AS  FOV:none on file   LAST RX:4/20/20 25 mcg take 1 tab daily 90 tabs 0 refills   LAST LABS:7/14/20 cmp,lipids  PER PROTOCOL: to pro

## 2020-07-20 ENCOUNTER — OFFICE VISIT (OUTPATIENT)
Dept: CARDIOLOGY | Age: 70
End: 2020-07-20

## 2020-07-20 VITALS
HEIGHT: 59 IN | DIASTOLIC BLOOD PRESSURE: 76 MMHG | SYSTOLIC BLOOD PRESSURE: 142 MMHG | WEIGHT: 142 LBS | HEART RATE: 70 BPM | BODY MASS INDEX: 28.63 KG/M2

## 2020-07-20 DIAGNOSIS — Z82.49 FAMILY HISTORY OF CORONARY ARTERIOSCLEROSIS: ICD-10-CM

## 2020-07-20 DIAGNOSIS — I10 HYPERTENSION, BENIGN: Primary | ICD-10-CM

## 2020-07-20 DIAGNOSIS — R00.2 PALPITATIONS: ICD-10-CM

## 2020-07-20 DIAGNOSIS — I49.1 PREMATURE ATRIAL CONTRACTIONS: ICD-10-CM

## 2020-07-20 DIAGNOSIS — R94.31 ABNORMAL EKG: ICD-10-CM

## 2020-07-20 DIAGNOSIS — E03.9 HYPOTHYROIDISM, UNSPECIFIED TYPE: ICD-10-CM

## 2020-07-20 DIAGNOSIS — E78.00 PURE HYPERCHOLESTEROLEMIA: ICD-10-CM

## 2020-07-20 PROCEDURE — 99214 OFFICE O/P EST MOD 30 MIN: CPT | Performed by: INTERNAL MEDICINE

## 2020-07-20 SDOH — HEALTH STABILITY: PHYSICAL HEALTH: ON AVERAGE, HOW MANY MINUTES DO YOU ENGAGE IN EXERCISE AT THIS LEVEL?: 60 MIN

## 2020-07-20 SDOH — HEALTH STABILITY: PHYSICAL HEALTH: ON AVERAGE, HOW MANY DAYS PER WEEK DO YOU ENGAGE IN MODERATE TO STRENUOUS EXERCISE (LIKE A BRISK WALK)?: 4 DAYS

## 2020-07-20 ASSESSMENT — ENCOUNTER SYMPTOMS
ALLERGIC/IMMUNOLOGIC COMMENTS: NO NEW FOOD ALLERGIES
WEIGHT GAIN: 0
FEVER: 0
COUGH: 0
WEIGHT LOSS: 0
HEMATOCHEZIA: 0
CHILLS: 0
HEMOPTYSIS: 0
BRUISES/BLEEDS EASILY: 0
SUSPICIOUS LESIONS: 0

## 2020-07-20 ASSESSMENT — PATIENT HEALTH QUESTIONNAIRE - PHQ9
SUM OF ALL RESPONSES TO PHQ9 QUESTIONS 1 AND 2: 0
1. LITTLE INTEREST OR PLEASURE IN DOING THINGS: NOT AT ALL
SUM OF ALL RESPONSES TO PHQ9 QUESTIONS 1 AND 2: 0
2. FEELING DOWN, DEPRESSED OR HOPELESS: NOT AT ALL
CLINICAL INTERPRETATION OF PHQ2 SCORE: NO FURTHER SCREENING NEEDED
CLINICAL INTERPRETATION OF PHQ9 SCORE: NO FURTHER SCREENING NEEDED

## 2020-08-12 ENCOUNTER — HOSPITAL ENCOUNTER (OUTPATIENT)
Dept: MAMMOGRAPHY | Facility: HOSPITAL | Age: 70
Discharge: HOME | End: 2020-08-12
Attending: SURGERY
Payer: MEDICARE

## 2020-08-12 DIAGNOSIS — D05.12 BREAST NEOPLASM, TIS (DCIS), LEFT: ICD-10-CM

## 2020-08-12 PROCEDURE — 77065 DX MAMMO INCL CAD UNI: CPT | Performed by: SURGERY

## 2020-08-12 PROCEDURE — 77061 BREAST TOMOSYNTHESIS UNI: CPT | Performed by: SURGERY

## 2020-08-12 RX ORDER — ANASTROZOLE 1 MG/1
TABLET ORAL
Qty: 90 TABLET | Refills: 2 | Status: SHIPPED | OUTPATIENT
Start: 2020-08-12 | End: 2021-05-05

## 2020-09-14 ENCOUNTER — TELEPHONE (OUTPATIENT)
Dept: INTERNAL MEDICINE CLINIC | Facility: CLINIC | Age: 70
End: 2020-09-14

## 2020-09-14 DIAGNOSIS — I10 ESSENTIAL HYPERTENSION: ICD-10-CM

## 2020-09-14 DIAGNOSIS — E78.00 PURE HYPERCHOLESTEROLEMIA: ICD-10-CM

## 2020-09-14 DIAGNOSIS — Z00.00 ROUTINE GENERAL MEDICAL EXAMINATION AT A HEALTH CARE FACILITY: Primary | ICD-10-CM

## 2020-09-14 DIAGNOSIS — E03.9 ACQUIRED HYPOTHYROIDISM: ICD-10-CM

## 2020-09-14 NOTE — TELEPHONE ENCOUNTER
Future Appointments   Date Time Provider Savanna Analy   11/25/2020  9:30 AM Francois Campos MD [facility]     Annual Physical   Lab is THE [facility]  Pt aware to fast and to complete labs no sooner than 2 weeks prior to physical   No call back req

## 2020-10-02 RX ORDER — LEVOTHYROXINE SODIUM 0.03 MG/1
TABLET ORAL
Qty: 90 TABLET | Refills: 0 | Status: SHIPPED | OUTPATIENT
Start: 2020-10-02 | End: 2020-12-31

## 2020-10-02 NOTE — TELEPHONE ENCOUNTER
Last OV 1.17.20 w/ AS (f/up)   Last PE 5.24.19-PE scheduled for 11.25.20   Last REFILL 7.17.20 Levothyroxine 25mcg #90 0R  Last LABS No recent labs within last 12 months     Future Appointments   Date Time Provider Savanna Beckford   11/25/2020  9:30 AM S

## 2020-10-27 NOTE — TELEPHONE ENCOUNTER
CBC and TSH ordered for [facility] lab per protocol. Pt had Lipid and CMP 7/2020. Pending Lipid and CMP, please sign if appropriate.

## 2020-11-25 ENCOUNTER — OFFICE VISIT (OUTPATIENT)
Dept: INTERNAL MEDICINE CLINIC | Facility: CLINIC | Age: 70
End: 2020-11-25
Payer: MEDICARE

## 2020-11-25 VITALS
SYSTOLIC BLOOD PRESSURE: 130 MMHG | HEIGHT: 59 IN | HEART RATE: 74 BPM | WEIGHT: 142 LBS | OXYGEN SATURATION: 96 % | BODY MASS INDEX: 28.63 KG/M2 | RESPIRATION RATE: 16 BRPM | TEMPERATURE: 98 F | DIASTOLIC BLOOD PRESSURE: 62 MMHG

## 2020-11-25 DIAGNOSIS — Q23.1 BICUSPID AORTIC VALVE: ICD-10-CM

## 2020-11-25 DIAGNOSIS — R73.9 HYPERGLYCEMIA: ICD-10-CM

## 2020-11-25 DIAGNOSIS — R00.2 PALPITATIONS: ICD-10-CM

## 2020-11-25 DIAGNOSIS — E03.9 ACQUIRED HYPOTHYROIDISM: ICD-10-CM

## 2020-11-25 DIAGNOSIS — I49.1 PREMATURE ATRIAL CONTRACTIONS: ICD-10-CM

## 2020-11-25 DIAGNOSIS — Z99.89 OSA ON CPAP: ICD-10-CM

## 2020-11-25 DIAGNOSIS — M85.89 OSTEOPENIA OF MULTIPLE SITES: ICD-10-CM

## 2020-11-25 DIAGNOSIS — I10 ESSENTIAL HYPERTENSION: Primary | ICD-10-CM

## 2020-11-25 DIAGNOSIS — G47.33 OSA ON CPAP: ICD-10-CM

## 2020-11-25 DIAGNOSIS — E78.00 PURE HYPERCHOLESTEROLEMIA: ICD-10-CM

## 2020-11-25 DIAGNOSIS — Z00.00 ENCOUNTER FOR ANNUAL HEALTH EXAMINATION: ICD-10-CM

## 2020-11-25 DIAGNOSIS — R90.82 WHITE MATTER ABNORMALITY ON MRI OF BRAIN: ICD-10-CM

## 2020-11-25 DIAGNOSIS — M67.40 GANGLION CYST: ICD-10-CM

## 2020-11-25 PROCEDURE — G0439 PPPS, SUBSEQ VISIT: HCPCS | Performed by: INTERNAL MEDICINE

## 2020-11-25 NOTE — PROGRESS NOTES
HPI:   Thomas Hernandez is a 65year old female who presents for a Medicare Subsequent Annual Wellness visit (Pt already had Initial Annual Wellness). Here for awv. Stable chronic issues, utd with screening, due for labs, ordered pt aware.  Notes cyst in date: 1970        Years since quittin.0      Smokeless tobacco: Never Used         CAGE Alcohol screening   Joelyn Severs was screened for Alcohol abuse and had a score of 0 so is at low risk.     Patient Care Team: Patient Care Team:  Misael Jaeger Oral Tab, Take 0.5 tablets by mouth as needed. •  Olmesartan Medoxomil 20 MG Oral Tab, Take 20 mg by mouth daily. •  Metoprolol Succinate ER 25 MG Oral Tablet 24 Hr, Take 25 mg by mouth daily.  1/2 tablet    •  hydrochlorothiazide 25 MG Oral Tab, Take ago. Her smoking use included cigarettes. She has a 1.00 pack-year smoking history. She has never used smokeless tobacco. She reports current alcohol use. She reports that she does not use drugs.      REVIEW OF SYSTEMS:   GENERAL: feels well otherwise  SKIN Heart:  Regular rate and rhythm, S1 and S2 normal, no murmur, rub, or gallop   Abdomen:   Soft, non-tender, bowel sounds active all four quadrants,  no masses, no organomegaly   Pelvic: Deferred   Extremities: Extremities normal, atraumatic, no cyanosis appetite been poor?: No  How does the patient maintain a good energy level?: Appropriate Exercise;Daily Walks;Stretching  How would you describe your daily physical activity?: Moderate  How would you describe your current health state?: Good  How do you ma CHLAMYDIA No flowsheet data found.     Screening Mammogram      Mammogram Annually to 74, then as discussed Mammogram due on 08/12/2021 Update Health Maintenance if applicable     Immunizations (Update Immunization Activity if applicable)     Influenza  Cov

## 2020-12-31 RX ORDER — METOPROLOL SUCCINATE 25 MG/1
TABLET, EXTENDED RELEASE ORAL
Qty: 45 TABLET | Refills: 1 | Status: SHIPPED | OUTPATIENT
Start: 2020-12-31 | End: 2021-06-30

## 2020-12-31 RX ORDER — LEVOTHYROXINE SODIUM 0.03 MG/1
TABLET ORAL
Qty: 90 TABLET | Refills: 0 | Status: SHIPPED | OUTPATIENT
Start: 2020-12-31 | End: 2021-03-31

## 2020-12-31 RX ORDER — HYDROCHLOROTHIAZIDE 25 MG/1
TABLET ORAL
Qty: 90 TABLET | Refills: 1 | Status: SHIPPED | OUTPATIENT
Start: 2020-12-31 | End: 2021-05-05

## 2020-12-31 NOTE — TELEPHONE ENCOUNTER
Last OV 11.25.20 w/ AS (annual pe)   Last PE 11.25.20   Last REFILL 10.2.20 Levothyroxine 25mcg #90 0R  Last LABS No recent labs within last 12 months     No future appointments. Per PROTOCOL?  FAILED-no TSH in last 12 months, last TSH value     Please

## 2021-01-13 ENCOUNTER — LAB ENCOUNTER (OUTPATIENT)
Dept: LAB | Facility: HOSPITAL | Age: 71
End: 2021-01-13
Attending: INTERNAL MEDICINE
Payer: MEDICARE

## 2021-01-13 DIAGNOSIS — I10 ESSENTIAL HYPERTENSION: ICD-10-CM

## 2021-01-13 DIAGNOSIS — Z00.00 ROUTINE GENERAL MEDICAL EXAMINATION AT A HEALTH CARE FACILITY: ICD-10-CM

## 2021-01-13 DIAGNOSIS — E03.9 ACQUIRED HYPOTHYROIDISM: Primary | ICD-10-CM

## 2021-01-13 DIAGNOSIS — E03.9 ACQUIRED HYPOTHYROIDISM: ICD-10-CM

## 2021-01-13 DIAGNOSIS — E78.00 PURE HYPERCHOLESTEROLEMIA: ICD-10-CM

## 2021-01-13 DIAGNOSIS — I10 ESSENTIAL HYPERTENSION, MALIGNANT: Primary | ICD-10-CM

## 2021-01-13 LAB
ALBUMIN SERPL-MCNC: 3.4 G/DL (ref 3.4–5)
ALBUMIN/GLOB SERPL: 0.9 {RATIO} (ref 1–2)
ALP LIVER SERPL-CCNC: 73 U/L
ALT SERPL-CCNC: 18 U/L
ANION GAP SERPL CALC-SCNC: 6 MMOL/L (ref 0–18)
AST SERPL-CCNC: 20 U/L (ref 15–37)
BASOPHILS # BLD AUTO: 0.08 X10(3) UL (ref 0–0.2)
BASOPHILS NFR BLD AUTO: 1.4 %
BILIRUB SERPL-MCNC: 0.5 MG/DL (ref 0.1–2)
BUN BLD-MCNC: 17 MG/DL (ref 7–18)
BUN/CREAT SERPL: 18.1 (ref 10–20)
CALCIUM BLD-MCNC: 9.6 MG/DL (ref 8.5–10.1)
CHLORIDE SERPL-SCNC: 104 MMOL/L (ref 98–112)
CHOLEST SMN-MCNC: 162 MG/DL (ref ?–200)
CO2 SERPL-SCNC: 28 MMOL/L (ref 21–32)
CREAT BLD-MCNC: 0.94 MG/DL
DEPRECATED RDW RBC AUTO: 42.5 FL (ref 35.1–46.3)
EOSINOPHIL # BLD AUTO: 0.13 X10(3) UL (ref 0–0.7)
EOSINOPHIL NFR BLD AUTO: 2.3 %
ERYTHROCYTE [DISTWIDTH] IN BLOOD BY AUTOMATED COUNT: 13.1 % (ref 11–15)
GLOBULIN PLAS-MCNC: 3.8 G/DL (ref 2.8–4.4)
GLUCOSE BLD-MCNC: 83 MG/DL (ref 70–99)
HCT VFR BLD AUTO: 40.5 %
HDLC SERPL-MCNC: 78 MG/DL (ref 40–59)
HGB BLD-MCNC: 13.2 G/DL
IMM GRANULOCYTES # BLD AUTO: 0.02 X10(3) UL (ref 0–1)
IMM GRANULOCYTES NFR BLD: 0.4 %
LDLC SERPL CALC-MCNC: 69 MG/DL (ref ?–100)
LYMPHOCYTES # BLD AUTO: 2.17 X10(3) UL (ref 1–4)
LYMPHOCYTES NFR BLD AUTO: 38.1 %
M PROTEIN MFR SERPL ELPH: 7.2 G/DL (ref 6.4–8.2)
MCH RBC QN AUTO: 29.1 PG (ref 26–34)
MCHC RBC AUTO-ENTMCNC: 32.6 G/DL (ref 31–37)
MCV RBC AUTO: 89.2 FL
MONOCYTES # BLD AUTO: 0.47 X10(3) UL (ref 0.1–1)
MONOCYTES NFR BLD AUTO: 8.3 %
NEUTROPHILS # BLD AUTO: 2.82 X10 (3) UL (ref 1.5–7.7)
NEUTROPHILS # BLD AUTO: 2.82 X10(3) UL (ref 1.5–7.7)
NEUTROPHILS NFR BLD AUTO: 49.5 %
NONHDLC SERPL-MCNC: 84 MG/DL (ref ?–130)
OSMOLALITY SERPL CALC.SUM OF ELEC: 287 MOSM/KG (ref 275–295)
PATIENT FASTING Y/N/NP: YES
PATIENT FASTING Y/N/NP: YES
PLATELET # BLD AUTO: 306 10(3)UL (ref 150–450)
POTASSIUM SERPL-SCNC: 4.3 MMOL/L (ref 3.5–5.1)
RBC # BLD AUTO: 4.54 X10(6)UL
SODIUM SERPL-SCNC: 138 MMOL/L (ref 136–145)
T4 FREE SERPL-MCNC: 1 NG/DL (ref 0.8–1.7)
TRIGL SERPL-MCNC: 74 MG/DL (ref 30–149)
TSI SER-ACNC: 4.32 MIU/ML (ref 0.36–3.74)
VLDLC SERPL CALC-MCNC: 15 MG/DL (ref 0–30)
WBC # BLD AUTO: 5.7 X10(3) UL (ref 4–11)

## 2021-01-13 PROCEDURE — 84439 ASSAY OF FREE THYROXINE: CPT

## 2021-01-13 PROCEDURE — 36415 COLL VENOUS BLD VENIPUNCTURE: CPT

## 2021-01-13 PROCEDURE — 80053 COMPREHEN METABOLIC PANEL: CPT

## 2021-01-13 PROCEDURE — 80061 LIPID PANEL: CPT

## 2021-01-13 PROCEDURE — 85025 COMPLETE CBC W/AUTO DIFF WBC: CPT

## 2021-01-13 PROCEDURE — 84443 ASSAY THYROID STIM HORMONE: CPT

## 2021-01-26 ENCOUNTER — APPOINTMENT (OUTPATIENT)
Dept: GENERAL RADIOLOGY | Facility: HOSPITAL | Age: 71
End: 2021-01-26
Attending: EMERGENCY MEDICINE
Payer: MEDICARE

## 2021-01-26 ENCOUNTER — TELEPHONE (OUTPATIENT)
Dept: INTERNAL MEDICINE CLINIC | Facility: CLINIC | Age: 71
End: 2021-01-26

## 2021-01-26 ENCOUNTER — HOSPITAL ENCOUNTER (EMERGENCY)
Facility: HOSPITAL | Age: 71
Discharge: HOME | End: 2021-01-26
Attending: EMERGENCY MEDICINE
Payer: MEDICARE

## 2021-01-26 VITALS
BODY MASS INDEX: 28.63 KG/M2 | RESPIRATION RATE: 18 BRPM | HEIGHT: 59.02 IN | TEMPERATURE: 98 F | DIASTOLIC BLOOD PRESSURE: 76 MMHG | SYSTOLIC BLOOD PRESSURE: 131 MMHG | OXYGEN SATURATION: 100 % | HEART RATE: 77 BPM | WEIGHT: 142 LBS

## 2021-01-26 DIAGNOSIS — R06.00 DYSPNEA, UNSPECIFIED TYPE: Primary | ICD-10-CM

## 2021-01-26 LAB
ALBUMIN SERPL-MCNC: 3.4 G/DL (ref 3.4–5)
ALBUMIN/GLOB SERPL: 0.9 {RATIO} (ref 1–2)
ALP LIVER SERPL-CCNC: 71 U/L
ALT SERPL-CCNC: 19 U/L
ANION GAP SERPL CALC-SCNC: 6 MMOL/L (ref 0–18)
AST SERPL-CCNC: 25 U/L (ref 15–37)
ATRIAL RATE: 76 BPM
BASOPHILS # BLD AUTO: 0.08 X10(3) UL (ref 0–0.2)
BASOPHILS NFR BLD AUTO: 1 %
BILIRUB SERPL-MCNC: 0.3 MG/DL (ref 0.1–2)
BUN BLD-MCNC: 21 MG/DL (ref 7–18)
BUN/CREAT SERPL: 24.7 (ref 10–20)
CALCIUM BLD-MCNC: 9.3 MG/DL (ref 8.5–10.1)
CHLORIDE SERPL-SCNC: 105 MMOL/L (ref 98–112)
CO2 SERPL-SCNC: 26 MMOL/L (ref 21–32)
CREAT BLD-MCNC: 0.85 MG/DL
D-DIMER: <0.27 UG/ML FEU (ref ?–0.72)
DEPRECATED RDW RBC AUTO: 42.2 FL (ref 35.1–46.3)
EOSINOPHIL # BLD AUTO: 0.1 X10(3) UL (ref 0–0.7)
EOSINOPHIL NFR BLD AUTO: 1.3 %
ERYTHROCYTE [DISTWIDTH] IN BLOOD BY AUTOMATED COUNT: 13.2 % (ref 11–15)
GLOBULIN PLAS-MCNC: 3.7 G/DL (ref 2.8–4.4)
GLUCOSE BLD-MCNC: 92 MG/DL (ref 70–99)
HCT VFR BLD AUTO: 37.8 %
HGB BLD-MCNC: 12.7 G/DL
IMM GRANULOCYTES # BLD AUTO: 0.02 X10(3) UL (ref 0–1)
IMM GRANULOCYTES NFR BLD: 0.3 %
LYMPHOCYTES # BLD AUTO: 1.69 X10(3) UL (ref 1–4)
LYMPHOCYTES NFR BLD AUTO: 21.9 %
M PROTEIN MFR SERPL ELPH: 7.1 G/DL (ref 6.4–8.2)
MCH RBC QN AUTO: 29.4 PG (ref 26–34)
MCHC RBC AUTO-ENTMCNC: 33.6 G/DL (ref 31–37)
MCV RBC AUTO: 87.5 FL
MONOCYTES # BLD AUTO: 0.46 X10(3) UL (ref 0.1–1)
MONOCYTES NFR BLD AUTO: 6 %
NEUTROPHILS # BLD AUTO: 5.38 X10 (3) UL (ref 1.5–7.7)
NEUTROPHILS # BLD AUTO: 5.38 X10(3) UL (ref 1.5–7.7)
NEUTROPHILS NFR BLD AUTO: 69.5 %
NT-PROBNP SERPL-MCNC: 228 PG/ML (ref ?–125)
OSMOLALITY SERPL CALC.SUM OF ELEC: 287 MOSM/KG (ref 275–295)
P AXIS: 47 DEGREES
P-R INTERVAL: 138 MS
PLATELET # BLD AUTO: 307 10(3)UL (ref 150–450)
POTASSIUM SERPL-SCNC: 4.1 MMOL/L (ref 3.5–5.1)
Q-T INTERVAL: 394 MS
QRS DURATION: 78 MS
QTC CALCULATION (BEZET): 443 MS
R AXIS: 22 DEGREES
RBC # BLD AUTO: 4.32 X10(6)UL
SARS-COV-2 RNA RESP QL NAA+PROBE: NOT DETECTED
SODIUM SERPL-SCNC: 137 MMOL/L (ref 136–145)
T AXIS: 57 DEGREES
TROPONIN I SERPL-MCNC: <0.045 NG/ML (ref ?–0.04)
VENTRICULAR RATE: 76 BPM
WBC # BLD AUTO: 7.7 X10(3) UL (ref 4–11)

## 2021-01-26 PROCEDURE — 85379 FIBRIN DEGRADATION QUANT: CPT | Performed by: EMERGENCY MEDICINE

## 2021-01-26 PROCEDURE — 83880 ASSAY OF NATRIURETIC PEPTIDE: CPT | Performed by: EMERGENCY MEDICINE

## 2021-01-26 PROCEDURE — 93010 ELECTROCARDIOGRAM REPORT: CPT

## 2021-01-26 PROCEDURE — 99285 EMERGENCY DEPT VISIT HI MDM: CPT

## 2021-01-26 PROCEDURE — 36415 COLL VENOUS BLD VENIPUNCTURE: CPT

## 2021-01-26 PROCEDURE — 80053 COMPREHEN METABOLIC PANEL: CPT | Performed by: EMERGENCY MEDICINE

## 2021-01-26 PROCEDURE — 84484 ASSAY OF TROPONIN QUANT: CPT | Performed by: EMERGENCY MEDICINE

## 2021-01-26 PROCEDURE — 93005 ELECTROCARDIOGRAM TRACING: CPT

## 2021-01-26 PROCEDURE — 87081 CULTURE SCREEN ONLY: CPT | Performed by: EMERGENCY MEDICINE

## 2021-01-26 PROCEDURE — 99284 EMERGENCY DEPT VISIT MOD MDM: CPT

## 2021-01-26 PROCEDURE — 71045 X-RAY EXAM CHEST 1 VIEW: CPT | Performed by: EMERGENCY MEDICINE

## 2021-01-26 PROCEDURE — 87430 STREP A AG IA: CPT | Performed by: EMERGENCY MEDICINE

## 2021-01-26 PROCEDURE — 85025 COMPLETE CBC W/AUTO DIFF WBC: CPT | Performed by: EMERGENCY MEDICINE

## 2021-01-26 NOTE — ED NOTES
PT REEVALUATED BY ER PHYSICIAN. INFORMED OF ALL HER TEST REPORTS AND PLAN OF CARE.  PT VERBALIZING UNDERSTANDING

## 2021-01-26 NOTE — TELEPHONE ENCOUNTER
Patient states x 1 month having increased mucus in throat causing her to clear her throat. Patient states last two days having difficulty getting full deep breath. Patient states activity does not impact this breathing change. Patient states palpitations are present along with breathing changes. Patient denies fevers, DVT, PE, no ill contacts, no recent covid testing. No congestion, runny nose, fevers, chest pain, cough. No over the counter medications take. Patient undergoing treatment for breast cancer. Due to hx and sxs patient advised to be seen in ER for evaluation. Patient agreeable to this plan. ALEXANDER AS.

## 2021-01-26 NOTE — ED NOTES
Pt reevaluated by er physician. Informed of all her test reports and plan of care.  Pt verbalizing undertsanding

## 2021-01-26 NOTE — TELEPHONE ENCOUNTER
Pt stated she is having shortness of breath that is getting worse. She also tries to yawn and she is not able to fully yawn and unable to take that full deep breath. She also stated she has been clearing her throat she feels mucus on the back of her throat for a month or so.  Call given to the nurse

## 2021-01-27 NOTE — ED PROVIDER NOTES
Patient Seen in: [facility] Emergency Department      History   Patient presents with:  Difficulty Breathing    Stated Complaint: Difficulty getting a deep breath for a week or so with phlegm in throat for a m*    HPI/Subjective:   HPI    This is a @ [facility] AND [facility]    • COLONOSCOPY  2012   • COLONOSCOPY  12/2017     6 mm polyp (removed, may not have been recovered --nl tissue and predominately fecal debris).  repeat 5 yrs   • COLONOSCOPY, POSSIBLE BIOPSY, POSSIBLE POLYPECTOMY 23062 N/A 12/7/2017 well-appearing older woman sitting up in the bed no acute distress  Head: Normocephalic and atraumatic. HEENT:  Mucous membranes are moist.  Oropharynx appears normal no significant cervical lymphadenopathy.   Oral mucosa appears normal.  Cardiovascular: AP PORTABLE  (CPT=71045)  TECHNIQUE:  AP chest radiograph was obtained.   COMPARISON:  [facility] , XR, XR CHEST AP PORTABLE  (CPT=71010), 12/03/2017, 1:06 AM.  INDICATIONS:  Difficulty getting a deep breath for a week or so with phlegm in throat for a month  P return precautions as we have discussed.                          Disposition and Plan     Clinical Impression:  Dyspnea, unspecified type  (primary encounter diagnosis)    Disposition:  Discharge  1/26/2021  5:45 pm    Follow-up:  Jannis Boast, [place]

## 2021-01-29 ENCOUNTER — OFFICE VISIT (OUTPATIENT)
Dept: INTERNAL MEDICINE CLINIC | Facility: CLINIC | Age: 71
End: 2021-01-29
Payer: MEDICARE

## 2021-01-29 ENCOUNTER — HOSPITAL ENCOUNTER (OUTPATIENT)
Dept: ULTRASOUND IMAGING | Age: 71
Discharge: HOME | End: 2021-01-29
Attending: INTERNAL MEDICINE
Payer: MEDICARE

## 2021-01-29 VITALS
SYSTOLIC BLOOD PRESSURE: 130 MMHG | BODY MASS INDEX: 28.65 KG/M2 | HEART RATE: 72 BPM | WEIGHT: 144 LBS | TEMPERATURE: 97 F | RESPIRATION RATE: 18 BRPM | OXYGEN SATURATION: 99 % | HEIGHT: 59.5 IN | DIASTOLIC BLOOD PRESSURE: 88 MMHG

## 2021-01-29 DIAGNOSIS — R68.89 THROAT CLEARING: ICD-10-CM

## 2021-01-29 DIAGNOSIS — R68.89 CHRONIC THROAT CLEARING: ICD-10-CM

## 2021-01-29 DIAGNOSIS — J02.9 SORE THROAT: Primary | ICD-10-CM

## 2021-01-29 DIAGNOSIS — J02.9 SORE THROAT: ICD-10-CM

## 2021-01-29 PROCEDURE — 76536 US EXAM OF HEAD AND NECK: CPT | Performed by: INTERNAL MEDICINE

## 2021-01-29 PROCEDURE — 99213 OFFICE O/P EST LOW 20 MIN: CPT | Performed by: INTERNAL MEDICINE

## 2021-01-29 NOTE — PROGRESS NOTES
Patient presents with:  ER F/U: MR rm 8 er f/up from SOB       HPI:  Here for ed f/u from sob. Not really sob, thought more of constant throat clearing for about a month. No cough, non exertional clearing throat.  No choking, no difficulty swallowing but fe Oral Tab Take 20 mg by mouth daily. • Metoprolol Succinate ER 25 MG Oral Tablet 24 Hr Take 25 mg by mouth daily. 1/2 tablet  5   • hydrochlorothiazide 25 MG Oral Tab Take 25 mg by mouth daily.      • Cholecalciferol 1000 UNITS Oral Chew Tab Chew 2 table (JNE=82146)    No follow-ups on file. There are no Patient Instructions on file for this visit. All questions were answered and the patient understands the plan.

## 2021-02-02 RX ORDER — OLMESARTAN MEDOXOMIL 20 MG/1
TABLET ORAL
Qty: 90 TABLET | Refills: 3 | Status: SHIPPED | OUTPATIENT
Start: 2021-02-02

## 2021-02-08 ENCOUNTER — MED REC SCAN ONLY (OUTPATIENT)
Dept: INTERNAL MEDICINE CLINIC | Facility: CLINIC | Age: 71
End: 2021-02-08

## 2021-03-09 DIAGNOSIS — Z23 NEED FOR VACCINATION: ICD-10-CM

## 2021-03-31 RX ORDER — LEVOTHYROXINE SODIUM 0.03 MG/1
TABLET ORAL
Qty: 90 TABLET | Refills: 0 | Status: SHIPPED | OUTPATIENT
Start: 2021-03-31 | End: 2021-05-06

## 2021-03-31 NOTE — TELEPHONE ENCOUNTER
Last visit- 11/25/2020    Last refill-  12/31/2020 levothyroxine sodium 25mcg QTY90 0R    Last labs-  01/13/2021 t4 free, lipid, cmp, tsh, cbc    No future appointments.     Per Protocol- Passed

## 2021-05-05 DIAGNOSIS — D05.12 DUCTAL CARCINOMA IN SITU (DCIS) OF LEFT BREAST: Primary | ICD-10-CM

## 2021-05-05 RX ORDER — HYDROCHLOROTHIAZIDE 25 MG/1
TABLET ORAL
Qty: 90 TABLET | Refills: 3 | Status: SHIPPED | OUTPATIENT
Start: 2021-05-05

## 2021-05-05 RX ORDER — ANASTROZOLE 1 MG/1
1 TABLET ORAL DAILY
Qty: 90 TABLET | Refills: 0 | Status: SHIPPED | OUTPATIENT
Start: 2021-05-05 | End: 2021-05-12

## 2021-05-06 RX ORDER — LEVOTHYROXINE SODIUM 0.03 MG/1
25 TABLET ORAL DAILY
Qty: 90 TABLET | Refills: 0 | Status: SHIPPED | OUTPATIENT
Start: 2021-05-06 | End: 2021-10-18

## 2021-05-06 NOTE — TELEPHONE ENCOUNTER
PASSED per protocol, refill sent.   Last PE 11.25.20   Future Appointments   Date Time Provider Savanna Beckford   5/12/2021 10:00 AM Riddhi Jeong MD [facility]

## 2021-05-11 NOTE — PROGRESS NOTES
[facility] Progress Note      Patient Name:  George Pride  YOB: 1950  Medical Record Number:  [id number]    Date of visit:  5/12/2021    CHIEF COMPLAINT: L breast DCIS s/p lumpectomy.     HPI:     65year old postmenopausal f • Cholecalciferol 1000 UNITS Oral Chew Tab Chew 2 tablets by mouth daily. • aspirin 81 MG Oral Tab Take 1 tablet by mouth daily. 0   • Multiple Vitamins-Minerals (MULTIVITAMIN OR) Take by mouth as needed.            VITALS:     05/12/21  0941   BP: 1 about 1 year (around 5/12/2022) for MD visit. Kash Morris M.D.     THE [facility] OF [facility]    [place] [address], [facility], [address]    5/12/2021

## 2021-05-12 ENCOUNTER — OFFICE VISIT (OUTPATIENT)
Dept: HEMATOLOGY/ONCOLOGY | Facility: HOSPITAL | Age: 71
End: 2021-05-12
Attending: INTERNAL MEDICINE
Payer: MEDICARE

## 2021-05-12 VITALS
DIASTOLIC BLOOD PRESSURE: 83 MMHG | HEIGHT: 59.49 IN | WEIGHT: 143 LBS | HEART RATE: 72 BPM | RESPIRATION RATE: 16 BRPM | OXYGEN SATURATION: 99 % | SYSTOLIC BLOOD PRESSURE: 150 MMHG | BODY MASS INDEX: 28.45 KG/M2 | TEMPERATURE: 98 F

## 2021-05-12 DIAGNOSIS — D05.12 DUCTAL CARCINOMA IN SITU (DCIS) OF LEFT BREAST: Primary | ICD-10-CM

## 2021-05-12 DIAGNOSIS — T50.905D ADVERSE EFFECT OF DRUG, SUBSEQUENT ENCOUNTER: ICD-10-CM

## 2021-05-12 DIAGNOSIS — Z78.0 POSTMENOPAUSAL: ICD-10-CM

## 2021-05-12 PROCEDURE — 99214 OFFICE O/P EST MOD 30 MIN: CPT | Performed by: INTERNAL MEDICINE

## 2021-05-12 RX ORDER — ANASTROZOLE 1 MG/1
1 TABLET ORAL DAILY
Qty: 90 TABLET | Refills: 3 | Status: SHIPPED | OUTPATIENT
Start: 2021-05-12

## 2021-05-12 NOTE — PROGRESS NOTES
Md follow up for left breast cancer. Pt taking anastrozole. Reports some muscle soreness, when she wakes up and after sitting.    Education Record    Learner:  Patient    Disease / Diagnosis:breast cancer    Barriers / Limitations:  None   Comments:    Me

## 2021-05-18 ENCOUNTER — HOSPITAL ENCOUNTER (OUTPATIENT)
Dept: MAMMOGRAPHY | Facility: HOSPITAL | Age: 71
Discharge: HOME | End: 2021-05-18
Attending: INTERNAL MEDICINE
Payer: MEDICARE

## 2021-05-18 DIAGNOSIS — D05.12 DUCTAL CARCINOMA IN SITU (DCIS) OF LEFT BREAST: ICD-10-CM

## 2021-05-18 PROCEDURE — 77066 DX MAMMO INCL CAD BI: CPT | Performed by: INTERNAL MEDICINE

## 2021-05-18 PROCEDURE — 77062 BREAST TOMOSYNTHESIS BI: CPT | Performed by: INTERNAL MEDICINE

## 2021-06-30 RX ORDER — METOPROLOL SUCCINATE 25 MG/1
TABLET, EXTENDED RELEASE ORAL
Qty: 45 TABLET | Refills: 0 | Status: SHIPPED | OUTPATIENT
Start: 2021-06-30

## 2021-07-19 ENCOUNTER — LAB ENCOUNTER (OUTPATIENT)
Dept: LAB | Facility: HOSPITAL | Age: 71
End: 2021-07-19
Attending: INTERNAL MEDICINE
Payer: MEDICARE

## 2021-07-19 DIAGNOSIS — E78.00 PURE HYPERCHOLESTEROLEMIA: ICD-10-CM

## 2021-07-19 DIAGNOSIS — E03.9 ACQUIRED HYPOTHYROIDISM: ICD-10-CM

## 2021-07-19 DIAGNOSIS — I10 ESSENTIAL HYPERTENSION, BENIGN: Primary | ICD-10-CM

## 2021-07-19 LAB
ALBUMIN SERPL-MCNC: 3.6 G/DL (ref 3.4–5)
ALBUMIN/GLOB SERPL: 1 {RATIO} (ref 1–2)
ALP LIVER SERPL-CCNC: 70 U/L
ALT SERPL-CCNC: 20 U/L
ANION GAP SERPL CALC-SCNC: 6 MMOL/L (ref 0–18)
AST SERPL-CCNC: 18 U/L (ref 15–37)
BILIRUB SERPL-MCNC: 0.6 MG/DL (ref 0.1–2)
BUN BLD-MCNC: 14 MG/DL (ref 7–18)
BUN/CREAT SERPL: 16.9 (ref 10–20)
CALCIUM BLD-MCNC: 9.2 MG/DL (ref 8.5–10.1)
CHLORIDE SERPL-SCNC: 104 MMOL/L (ref 98–112)
CHOLEST SMN-MCNC: 171 MG/DL (ref ?–200)
CO2 SERPL-SCNC: 27 MMOL/L (ref 21–32)
CREAT BLD-MCNC: 0.83 MG/DL
GLOBULIN PLAS-MCNC: 3.6 G/DL (ref 2.8–4.4)
GLUCOSE BLD-MCNC: 83 MG/DL (ref 70–99)
HDLC SERPL-MCNC: 77 MG/DL (ref 40–59)
LDLC SERPL CALC-MCNC: 80 MG/DL (ref ?–100)
M PROTEIN MFR SERPL ELPH: 7.2 G/DL (ref 6.4–8.2)
NONHDLC SERPL-MCNC: 94 MG/DL (ref ?–130)
OSMOLALITY SERPL CALC.SUM OF ELEC: 284 MOSM/KG (ref 275–295)
PATIENT FASTING Y/N/NP: YES
PATIENT FASTING Y/N/NP: YES
POTASSIUM SERPL-SCNC: 4 MMOL/L (ref 3.5–5.1)
SODIUM SERPL-SCNC: 137 MMOL/L (ref 136–145)
TRIGL SERPL-MCNC: 72 MG/DL (ref 30–149)
TSI SER-ACNC: 3.61 MIU/ML (ref 0.36–3.74)
VLDLC SERPL CALC-MCNC: 11 MG/DL (ref 0–30)

## 2021-07-19 PROCEDURE — 36415 COLL VENOUS BLD VENIPUNCTURE: CPT

## 2021-07-19 PROCEDURE — 80061 LIPID PANEL: CPT

## 2021-07-19 PROCEDURE — 84443 ASSAY THYROID STIM HORMONE: CPT

## 2021-07-19 PROCEDURE — 80053 COMPREHEN METABOLIC PANEL: CPT

## 2021-07-21 ENCOUNTER — TELEPHONE (OUTPATIENT)
Dept: CARDIOLOGY | Age: 71
End: 2021-07-21

## 2021-09-28 ENCOUNTER — LAB ENCOUNTER (OUTPATIENT)
Dept: LAB | Age: 71
End: 2021-09-28
Attending: FAMILY MEDICINE
Payer: MEDICARE

## 2021-09-28 ENCOUNTER — OFFICE VISIT (OUTPATIENT)
Dept: INTERNAL MEDICINE CLINIC | Facility: CLINIC | Age: 71
End: 2021-09-28
Payer: MEDICARE

## 2021-09-28 ENCOUNTER — HOSPITAL ENCOUNTER (OUTPATIENT)
Dept: GENERAL RADIOLOGY | Age: 71
Discharge: HOME | End: 2021-09-28
Attending: FAMILY MEDICINE
Payer: MEDICARE

## 2021-09-28 VITALS
DIASTOLIC BLOOD PRESSURE: 72 MMHG | HEART RATE: 77 BPM | OXYGEN SATURATION: 97 % | SYSTOLIC BLOOD PRESSURE: 126 MMHG | RESPIRATION RATE: 16 BRPM | BODY MASS INDEX: 28.81 KG/M2 | WEIGHT: 141 LBS | HEIGHT: 58.66 IN | TEMPERATURE: 98 F

## 2021-09-28 DIAGNOSIS — R20.0 NUMBNESS OF LEFT ANTERIOR THIGH: ICD-10-CM

## 2021-09-28 DIAGNOSIS — R20.0 NUMBNESS OF LEFT ANTERIOR THIGH: Primary | ICD-10-CM

## 2021-09-28 DIAGNOSIS — E03.9 ACQUIRED HYPOTHYROIDISM: ICD-10-CM

## 2021-09-28 LAB
BASOPHILS # BLD AUTO: 0.05 X10(3) UL (ref 0–0.2)
BASOPHILS NFR BLD AUTO: 0.7 %
EOSINOPHIL # BLD AUTO: 0.02 X10(3) UL (ref 0–0.7)
EOSINOPHIL NFR BLD AUTO: 0.3 %
ERYTHROCYTE [DISTWIDTH] IN BLOOD BY AUTOMATED COUNT: 13.4 %
FOLATE SERPL-MCNC: 32.6 NG/ML (ref 8.7–?)
HCT VFR BLD AUTO: 37.1 %
HGB BLD-MCNC: 12.4 G/DL
IMM GRANULOCYTES # BLD AUTO: 0.02 X10(3) UL (ref 0–1)
IMM GRANULOCYTES NFR BLD: 0.3 %
LYMPHOCYTES # BLD AUTO: 1.7 X10(3) UL (ref 1–4)
LYMPHOCYTES NFR BLD AUTO: 22.6 %
MCH RBC QN AUTO: 29.5 PG (ref 26–34)
MCHC RBC AUTO-ENTMCNC: 33.4 G/DL (ref 31–37)
MCV RBC AUTO: 88.1 FL
MONOCYTES # BLD AUTO: 0.36 X10(3) UL (ref 0.1–1)
MONOCYTES NFR BLD AUTO: 4.8 %
NEUTROPHILS # BLD AUTO: 5.37 X10 (3) UL (ref 1.5–7.7)
NEUTROPHILS # BLD AUTO: 5.37 X10(3) UL (ref 1.5–7.7)
NEUTROPHILS NFR BLD AUTO: 71.3 %
PLATELET # BLD AUTO: 318 10(3)UL (ref 150–450)
RBC # BLD AUTO: 4.21 X10(6)UL
VIT B12 SERPL-MCNC: 981 PG/ML (ref 193–986)
WBC # BLD AUTO: 7.5 X10(3) UL (ref 4–11)

## 2021-09-28 PROCEDURE — 36415 COLL VENOUS BLD VENIPUNCTURE: CPT

## 2021-09-28 PROCEDURE — 82607 VITAMIN B-12: CPT

## 2021-09-28 PROCEDURE — 72110 X-RAY EXAM L-2 SPINE 4/>VWS: CPT | Performed by: FAMILY MEDICINE

## 2021-09-28 PROCEDURE — 85025 COMPLETE CBC W/AUTO DIFF WBC: CPT

## 2021-09-28 PROCEDURE — 99213 OFFICE O/P EST LOW 20 MIN: CPT | Performed by: FAMILY MEDICINE

## 2021-09-28 PROCEDURE — 82746 ASSAY OF FOLIC ACID SERUM: CPT

## 2021-09-28 NOTE — PROGRESS NOTES
Victoria Malone  10/23/1950    Patient presents with:  Numbness: ES rm - 8 Intermittent left thigh numbness above the knee for 1 mo.       HPI:   Thomas Hernandez is a 65year old female who presents with one month of left leg numbness that has been intermitte Active Problem List:     HTN (hypertension)     Hypothyroid     White matter abnormality on MRI of brain     Osteopenia     Hyperglycemia     AUDREY on CPAP     Palpitations     Pure hypercholesterolemia     Premature atrial contractions     Bicuspid aortic v quittin.8      Smokeless tobacco: Never Used    Vaping Use      Vaping Use: Never used    Alcohol use: Yes    Drug use: Never        REVIEW OF SYSTEMS:   GENERAL: feels well otherwise  SKIN: no rashes  EYES:denies blurred vision or double vision  HEEN evaluation with neurology. 1. Numbness of left anterior thigh  - CBC WITH DIFFERENTIAL WITH PLATELET; Future  - B12 AND FOLATE; Future  - XR LUMBAR SPINE (MIN 4 VIEWS) (CPT=72110); Future  - OP REFERRAL TO [facility]  2.  Acquired h

## 2021-09-29 ENCOUNTER — TELEPHONE (OUTPATIENT)
Dept: INTERNAL MEDICINE CLINIC | Facility: CLINIC | Age: 71
End: 2021-09-29

## 2021-09-29 NOTE — TELEPHONE ENCOUNTER
Future Appointments   Date Time Provider Savanna Beckford     Future Appointments   Date Time Provider Savanna Beckford   11/30/2021 10:00 AM Emily Rinaldi MD [facility]       Orders to [facility]- Pt informed that labs need to be completed no so

## 2021-09-30 ENCOUNTER — TELEPHONE (OUTPATIENT)
Dept: INTERNAL MEDICINE CLINIC | Facility: CLINIC | Age: 71
End: 2021-09-30

## 2021-09-30 NOTE — TELEPHONE ENCOUNTER
Discussed results with patient. Normal CBC and B12/Folate. Lumbar XR with degenerative changes. Will begin PT as discussed.

## 2021-10-18 RX ORDER — LEVOTHYROXINE SODIUM 0.03 MG/1
TABLET ORAL
Qty: 90 TABLET | Refills: 0 | Status: SHIPPED | OUTPATIENT
Start: 2021-10-18 | End: 2022-01-03

## 2021-11-01 ENCOUNTER — TELEPHONE (OUTPATIENT)
Dept: PHYSICAL THERAPY | Facility: HOSPITAL | Age: 71
End: 2021-11-01

## 2021-11-02 ENCOUNTER — OFFICE VISIT (OUTPATIENT)
Dept: PHYSICAL THERAPY | Age: 71
End: 2021-11-02
Attending: FAMILY MEDICINE
Payer: MEDICARE

## 2021-11-02 DIAGNOSIS — R20.0 NUMBNESS OF LEFT ANTERIOR THIGH: ICD-10-CM

## 2021-11-02 PROCEDURE — 97162 PT EVAL MOD COMPLEX 30 MIN: CPT | Performed by: PHYSICAL THERAPIST

## 2021-11-02 PROCEDURE — 97110 THERAPEUTIC EXERCISES: CPT | Performed by: PHYSICAL THERAPIST

## 2021-11-02 NOTE — PROGRESS NOTES
BACK EVALUATION:    Referring Physician: Marcy Hsu    DX Code: Numbness of left anterior thigh (R20.0)       PT DX: Numbness of left anterior thigh (R20.0)      PCP:     Age: 66 Occupation: retired office work    DOI: 1.5 months  DOS: -         [address] [place], [place] [address]grzegorz Cosby ADI postural ed. HEP: Handouts given  Pt. Education: Patient counseled to maintain / resume normal activities. Postural Education     Signs and symptoms are consistent with patient’s diagnosis.   Functional impairments include:Difficulty or limited tole you recently had thoughts of hurting yourself?   No    Have you tried to hurt yourself in the past?  No

## 2021-11-03 ENCOUNTER — HOSPITAL ENCOUNTER (OUTPATIENT)
Dept: MAMMOGRAPHY | Facility: HOSPITAL | Age: 71
Discharge: HOME | End: 2021-11-03
Attending: INTERNAL MEDICINE
Payer: MEDICARE

## 2021-11-03 DIAGNOSIS — D05.12 DUCTAL CARCINOMA IN SITU (DCIS) OF LEFT BREAST: ICD-10-CM

## 2021-11-03 PROCEDURE — 77065 DX MAMMO INCL CAD UNI: CPT | Performed by: INTERNAL MEDICINE

## 2021-11-03 PROCEDURE — 77061 BREAST TOMOSYNTHESIS UNI: CPT | Performed by: INTERNAL MEDICINE

## 2021-11-04 ENCOUNTER — OFFICE VISIT (OUTPATIENT)
Dept: PHYSICAL THERAPY | Age: 71
End: 2021-11-04
Attending: FAMILY MEDICINE
Payer: MEDICARE

## 2021-11-04 ENCOUNTER — PATIENT MESSAGE (OUTPATIENT)
Dept: INTERNAL MEDICINE CLINIC | Facility: CLINIC | Age: 71
End: 2021-11-04

## 2021-11-04 PROCEDURE — 97110 THERAPEUTIC EXERCISES: CPT | Performed by: PHYSICAL THERAPIST

## 2021-11-04 PROCEDURE — 97140 MANUAL THERAPY 1/> REGIONS: CPT | Performed by: PHYSICAL THERAPIST

## 2021-11-04 NOTE — TELEPHONE ENCOUNTER
TSH w Ref, CMP, and Lipid done 7/2021  CBC done 9/2021    Please advise if further labs needed prior to appt

## 2021-11-04 NOTE — PROGRESS NOTES
Dx: Numbness of left anterior thigh (R20.0)         Authorized # of Visits:  12         Next MD visit: none scheduled  Fall Risk: standard         Precautions: n/a           Medication Changes since last visit?: No  Subjective: doing HEP, feels that maybe

## 2021-11-09 ENCOUNTER — OFFICE VISIT (OUTPATIENT)
Dept: PHYSICAL THERAPY | Age: 71
End: 2021-11-09
Attending: FAMILY MEDICINE
Payer: MEDICARE

## 2021-11-09 PROCEDURE — 97110 THERAPEUTIC EXERCISES: CPT | Performed by: PHYSICAL THERAPIST

## 2021-11-09 PROCEDURE — 97140 MANUAL THERAPY 1/> REGIONS: CPT | Performed by: PHYSICAL THERAPIST

## 2021-11-09 NOTE — PROGRESS NOTES
Dx: Numbness of left anterior thigh (R20.0)         Authorized # of Visits:  12         Next MD visit: none scheduled  Fall Risk: standard         Precautions: n/a           Medication Changes since last visit?: No     Subjective: did a lot of shopping ove home exercise program and self management    Plan: add prone hip ext to HEP. cont BALTA to abolish L thigh sx's at onset, assess sx response and how long before sx's return.     Skilled Services: TE, MT    Charges: TE2, MT1       Total Timed Treatment: 46 mi

## 2021-11-11 ENCOUNTER — OFFICE VISIT (OUTPATIENT)
Dept: PHYSICAL THERAPY | Age: 71
End: 2021-11-11
Attending: FAMILY MEDICINE
Payer: MEDICARE

## 2021-11-11 PROCEDURE — 97140 MANUAL THERAPY 1/> REGIONS: CPT | Performed by: PHYSICAL THERAPIST

## 2021-11-11 PROCEDURE — 97110 THERAPEUTIC EXERCISES: CPT | Performed by: PHYSICAL THERAPIST

## 2021-11-16 ENCOUNTER — OFFICE VISIT (OUTPATIENT)
Dept: PHYSICAL THERAPY | Age: 71
End: 2021-11-16
Attending: FAMILY MEDICINE
Payer: MEDICARE

## 2021-11-16 PROCEDURE — 97110 THERAPEUTIC EXERCISES: CPT | Performed by: PHYSICAL THERAPIST

## 2021-11-16 NOTE — PROGRESS NOTES
Dx: Numbness of left anterior thigh (R20.0)         Authorized # of Visits:  12         Next MD visit: none scheduled  Fall Risk: standard         Precautions: n/a           Medication Changes since last visit?: No     Subjective: did 2 hours walking yeste and rot mobs in ext gr 3 x 6 min Man lumbar ext mobs and rot mobs in ext gr 3 x 6 min Man lumbar ext mobs and rot mobs in ext gr 3 x 6 min -                             Assessment: progressing well with symptom management, no symptoms provoked with increas

## 2021-11-18 ENCOUNTER — OFFICE VISIT (OUTPATIENT)
Dept: PHYSICAL THERAPY | Age: 71
End: 2021-11-18
Attending: FAMILY MEDICINE
Payer: MEDICARE

## 2021-11-18 PROCEDURE — 97110 THERAPEUTIC EXERCISES: CPT | Performed by: PHYSICAL THERAPIST

## 2021-11-18 NOTE — PROGRESS NOTES
Dx: Numbness of left anterior thigh (R20.0)         Authorized # of Visits:  12         Next MD visit: none scheduled  Fall Risk: standard         Precautions: n/a           Medication Changes since last visit?: No     Subjective: staying active, feeling f REIL x 10 REIL x 10 REIL x 10 REIL x 10     Man PKB mobs gr 3 x 2 min ea Man PKB mobs gr 3 x 2 min ea Man PKB mobs gr 3 x 2 min ea - -     Man lumbar ext mobs and rot mobs in ext gr 3 x 6 min Man lumbar ext mobs and rot mobs in ext gr 3 x 6 min Man lumbar

## 2021-11-23 ENCOUNTER — OFFICE VISIT (OUTPATIENT)
Dept: PHYSICAL THERAPY | Age: 71
End: 2021-11-23
Attending: FAMILY MEDICINE
Payer: MEDICARE

## 2021-11-23 PROCEDURE — 97110 THERAPEUTIC EXERCISES: CPT | Performed by: PHYSICAL THERAPIST

## 2021-11-29 ENCOUNTER — OFFICE VISIT (OUTPATIENT)
Dept: PHYSICAL THERAPY | Age: 71
End: 2021-11-29
Attending: FAMILY MEDICINE
Payer: MEDICARE

## 2021-11-29 PROCEDURE — 97110 THERAPEUTIC EXERCISES: CPT | Performed by: PHYSICAL THERAPIST

## 2021-11-29 NOTE — PROGRESS NOTES
Dx: Numbness of left anterior thigh (R20.0)         Authorized # of Visits:  12         Next MD visit: none scheduled  Fall Risk: standard         Precautions: n/a           Medication Changes since last visit?: No     Subjective: staying active, feeling f reps Shuttle single leg squat 5 bands x 30 ea Shuttle single leg squat 5 bands x 30 ea   LTR x 20 ea LTR x 20 ea - LTR x 20 ea LTR x 20 ea LTR x 20 ea      DKTC sw ball x 20 SKTC x 20 SKTC x 20 SKTC x 20    Bridging 2x10 Bridging 2x10 Bridging sw ball  2x1

## 2021-11-30 ENCOUNTER — OFFICE VISIT (OUTPATIENT)
Dept: INTERNAL MEDICINE CLINIC | Facility: CLINIC | Age: 71
End: 2021-11-30
Payer: MEDICARE

## 2021-11-30 VITALS
BODY MASS INDEX: 29.52 KG/M2 | RESPIRATION RATE: 16 BRPM | DIASTOLIC BLOOD PRESSURE: 84 MMHG | OXYGEN SATURATION: 99 % | SYSTOLIC BLOOD PRESSURE: 138 MMHG | HEIGHT: 58 IN | TEMPERATURE: 98 F | WEIGHT: 140.63 LBS | HEART RATE: 70 BPM

## 2021-11-30 DIAGNOSIS — Z00.00 ENCOUNTER FOR ANNUAL HEALTH EXAMINATION: Primary | ICD-10-CM

## 2021-11-30 DIAGNOSIS — M85.80 OSTEOPENIA, UNSPECIFIED LOCATION: ICD-10-CM

## 2021-11-30 DIAGNOSIS — I10 PRIMARY HYPERTENSION: ICD-10-CM

## 2021-11-30 DIAGNOSIS — D05.12 DUCTAL CARCINOMA IN SITU (DCIS) OF LEFT BREAST: ICD-10-CM

## 2021-11-30 DIAGNOSIS — E03.9 HYPOTHYROIDISM, UNSPECIFIED TYPE: ICD-10-CM

## 2021-11-30 DIAGNOSIS — Z98.890 STATUS POST LEFT BREAST LUMPECTOMY: ICD-10-CM

## 2021-11-30 DIAGNOSIS — E78.00 PURE HYPERCHOLESTEROLEMIA: ICD-10-CM

## 2021-11-30 PROCEDURE — G0439 PPPS, SUBSEQ VISIT: HCPCS | Performed by: FAMILY MEDICINE

## 2021-11-30 NOTE — PATIENT INSTRUCTIONS
Lisa Malone's SCREENING SCHEDULE   Tests on this list are recommended by your physician but may not be covered, or covered at this frequency, by your insurer. Please check with your insurance carrier before scheduling to verify coverage.    GABRIEL 01/20/2020      No recommendations at this time   Pap and Pelvic    Pap   Covered every 2 years for women at normal risk;  Annually if at high risk -  No recommendations at this time    Chlamydia Annually if high risk -  No recommendations at this time   Sc the [place]. This site has a lot of good information including definitions of the different types of Advance Directives.  It also has the State forms available on it's website for anyone to review and print using their home computer a

## 2021-11-30 NOTE — PROGRESS NOTES
HPI:   Bree Tanner is a 66year old female who presents for a Medicare Subsequent Annual Wellness visit (Pt already had Initial Annual Wellness).     Bree Tanner is presenting for her wellness exam. 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     She  has a past body mass index is 29.39 kg/m² as calculated from the following:    Height as of this encounter: 4' 10\" (1.473 m). Weight as of this encounter: 140 lb 9.6 oz (63.8 kg).     Medicare Hearing Assessment  (Required for AWV/SWV)    Hearing Screening    Scre exercise. No follow-ups on file.      Arya Owens MD, 11/30/2021     General Health           Supplementary Documentation:   Gonzalo Malone's SCREENING SCHEDULE   Tests on this list are recommended by your physician but may not be covered, or covered deficiency.     Covered yearly for long-term glucocorticoid medication use (Steroids) Last Dexa Scan:    XR DEXA BONE DENSITOMETRY (CPT=77080) 01/20/2020      No recommendations at this time   Pap and Pelvic    Pap   Covered every 2 years for women at [facility]

## 2021-12-02 ENCOUNTER — OFFICE VISIT (OUTPATIENT)
Dept: PHYSICAL THERAPY | Age: 71
End: 2021-12-02
Attending: FAMILY MEDICINE
Payer: MEDICARE

## 2021-12-02 PROCEDURE — 97110 THERAPEUTIC EXERCISES: CPT | Performed by: PHYSICAL THERAPIST

## 2022-01-03 RX ORDER — LEVOTHYROXINE SODIUM 0.03 MG/1
TABLET ORAL
Qty: 90 TABLET | Refills: 0 | Status: SHIPPED | OUTPATIENT
Start: 2022-01-03

## 2022-01-07 ENCOUNTER — TELEMEDICINE (OUTPATIENT)
Dept: INTERNAL MEDICINE CLINIC | Facility: CLINIC | Age: 72
End: 2022-01-07
Payer: MEDICARE

## 2022-01-07 DIAGNOSIS — R50.9 FEVER, UNSPECIFIED FEVER CAUSE: Primary | ICD-10-CM

## 2022-01-07 DIAGNOSIS — J02.9 SORE THROAT: ICD-10-CM

## 2022-01-07 PROCEDURE — 99213 OFFICE O/P EST LOW 20 MIN: CPT | Performed by: NURSE PRACTITIONER

## 2022-01-07 NOTE — PROGRESS NOTES
Due to COVID-19 ACTION PLAN, the patient's office visit was converted to a video visit. This visit is conducted using video and audio. The patient consents to this service.   The patient understands and accepts financial responsibility for any deductible, cough  CARDIOVASCULAR: denies chest pain on exertion, no palpatations  GI: denies abdominal pain and denies heartburn, no diarrhea or constipation  MUSCULOSKELETAL:  myalgias  NEURO: mild headaches,       EXAM:   Limited exam as this is a video visit.   Flavio home and at work until a full 10 days after your first day of symptoms. -If you test positive or develop COVID-19 symptoms, isolate from other people.    -If you tested positive for COVID-19 with a viral test within the previous 90 days and subsequentl

## 2022-01-12 ENCOUNTER — APPOINTMENT (OUTPATIENT)
Dept: GENERAL RADIOLOGY | Age: 72
End: 2022-01-12
Attending: NURSE PRACTITIONER
Payer: MEDICARE

## 2022-01-12 ENCOUNTER — HOSPITAL ENCOUNTER (OUTPATIENT)
Age: 72
Discharge: HOME | End: 2022-01-12
Payer: MEDICARE

## 2022-01-12 VITALS
TEMPERATURE: 98 F | WEIGHT: 140 LBS | OXYGEN SATURATION: 100 % | BODY MASS INDEX: 27.48 KG/M2 | SYSTOLIC BLOOD PRESSURE: 141 MMHG | HEIGHT: 60 IN | HEART RATE: 70 BPM | RESPIRATION RATE: 16 BRPM | DIASTOLIC BLOOD PRESSURE: 77 MMHG

## 2022-01-12 DIAGNOSIS — R05.9 COUGH: Primary | ICD-10-CM

## 2022-01-12 DIAGNOSIS — U07.1 COVID-19 VIRUS INFECTION: ICD-10-CM

## 2022-01-12 LAB — SARS-COV-2 RNA RESP QL NAA+PROBE: DETECTED

## 2022-01-12 PROCEDURE — 71046 X-RAY EXAM CHEST 2 VIEWS: CPT | Performed by: NURSE PRACTITIONER

## 2022-01-12 PROCEDURE — 99214 OFFICE O/P EST MOD 30 MIN: CPT | Performed by: NURSE PRACTITIONER

## 2022-01-12 PROCEDURE — U0002 COVID-19 LAB TEST NON-CDC: HCPCS | Performed by: NURSE PRACTITIONER

## 2022-01-12 RX ORDER — BENZONATATE 100 MG/1
100 CAPSULE ORAL 3 TIMES DAILY PRN
Qty: 30 CAPSULE | Refills: 0 | Status: SHIPPED | OUTPATIENT
Start: 2022-01-12 | End: 2022-02-11

## 2022-01-12 RX ORDER — ALBUTEROL SULFATE 90 UG/1
2 AEROSOL, METERED RESPIRATORY (INHALATION) EVERY 4 HOURS PRN
Qty: 1 EACH | Refills: 0 | Status: SHIPPED | OUTPATIENT
Start: 2022-01-12 | End: 2022-02-11

## 2022-01-12 NOTE — ED PROVIDER NOTES
Patient Seen in: Immediate [address] [place] [address] Highway      History   Patient presents with:  Cough/URI    Stated Complaint: coughing    Subjective:   HPI  Patient is 66-year-old female past medical history of hypothyroidism, AUDREY, coronary artery disease, hype COLONOSCOPY  2012   • COLONOSCOPY  12/2017     6 mm polyp (removed, may not have been recovered --nl tissue and predominately fecal debris).  repeat 5 yrs   • COLONOSCOPY N/A 12/7/2017    Procedure: COLONOSCOPY, POSSIBLE BIOPSY, POSSIBLE POLYPECTOMY 04778; Mouth: Mucous membranes are moist.      Pharynx: Oropharynx is clear. No oropharyngeal exudate or posterior oropharyngeal erythema.    Eyes:      Conjunctiva/sclera: Conjunctivae normal.   Cardiovascular:      Rate and Rhythm: Normal rate and regular rhythm CONCLUSION:  Negative exam.    Dictated by (CST): Thanh Carter DO on 1/12/2022 at 10:23 AM     Finalized by (CST): Thanh Carter DO on 1/12/2022 at 10:24 AM            MDM     COVID test positive  Chest x-ray; negative exam    Quarantine precautions d

## 2022-01-12 NOTE — ED INITIAL ASSESSMENT (HPI)
Vaccinated, dry to productive cough & low grade fever x 1 week. Recent covid test all negative. Still waiting for PCR results which was done one week ago.

## 2022-01-19 ENCOUNTER — TELEPHONE (OUTPATIENT)
Dept: INTERNAL MEDICINE CLINIC | Facility: CLINIC | Age: 72
End: 2022-01-19

## 2022-01-19 NOTE — TELEPHONE ENCOUNTER
Spoke with pt. Pt said that her sx here improving, but today pt said she feels like she has regressed. Pt is having cough (productive) and  Fatigue. Denies SOB. Temp 98.8. Pt was prescribed albuterol and benzonatate in UC last week. Pt has not used albuterol and has used benzonatate a few times with minimal relief. Has been taking coricidin. Pt looking for any further recommendations. Pt seen for VV by SD on 1/7/22. Seen in UC on 1/12/22. Pt would not like to do VV. Last cpe with [address] 11/30/2021. Routing to [address] Please advise.

## 2022-01-19 NOTE — TELEPHONE ENCOUNTER
Virtual visit 1/7 her covid test was pending from outside facitlity  She tested positive 1/12 at [facility]. This would change her quarantine guidelines and ability to come into the office. What were her results from the outside facility? She should use inhaler she received from  3 times daily   CXR at [facility] was negative. Push fluids. Rest  Tessalon and add albuterol. Need outside facility results.

## 2022-01-19 NOTE — TELEPHONE ENCOUNTER
Spoke with pt. Pt said that her test she took on 1/6 was negative. Informed her of SD recommendations (fluids, albuterol, tessalon). Pt stated understanding. Isolation guidelines discussed with pt. Pt informed me she went to [facility] today and rapid is still +. Informed pt that tests can come back + for several weeks. Advised to monitor sx. Pt stated understanding. Routing to SD to update.

## 2022-01-19 NOTE — TELEPHONE ENCOUNTER
Patient covid + 1/12/22 still having cough and congestion. Did rapid test today and still positive.   Please advise

## 2022-04-04 RX ORDER — LEVOTHYROXINE SODIUM 0.03 MG/1
TABLET ORAL
Qty: 90 TABLET | Refills: 0 | Status: SHIPPED | OUTPATIENT
Start: 2022-04-04

## 2022-04-25 ENCOUNTER — TELEPHONE (OUTPATIENT)
Dept: HEMATOLOGY/ONCOLOGY | Facility: HOSPITAL | Age: 72
End: 2022-04-25

## 2022-04-25 NOTE — TELEPHONE ENCOUNTER
Patient is having a DEXA scan on 4/27/22. Patient wants to know if she can continue to take Vitamin D3 gummies. Please advise. Lisa's home phone number - [phone]. Her cell number - [phone].

## 2022-04-27 ENCOUNTER — HOSPITAL ENCOUNTER (OUTPATIENT)
Dept: BONE DENSITY | Age: 72
Discharge: HOME | End: 2022-04-27
Attending: INTERNAL MEDICINE
Payer: MEDICARE

## 2022-04-27 DIAGNOSIS — Z78.0 POSTMENOPAUSAL: ICD-10-CM

## 2022-04-27 PROCEDURE — 77080 DXA BONE DENSITY AXIAL: CPT | Performed by: INTERNAL MEDICINE

## 2022-04-29 NOTE — PROGRESS NOTES
DISCHARGE NOTE    Dx: Numbness of left anterior thigh (R20.0)         Authorized # of Visits:  12         Next MD visit: none scheduled  Fall Risk: standard         Precautions: n/a           Medication Changes since last visit?: No     Subjective: staying 20 ea       Prone hip ext x 20 ea Prone hip ext x 20 ea Prone hip ext x 20 ea Prone hip ext x 20 ea       REIL x 10 REIL x 10 REIL x 10 REIL x 10       - -         - -                                  Assessment: good progress made, no recent symptoms.  All 60

## 2022-05-10 ENCOUNTER — HOSPITAL ENCOUNTER (OUTPATIENT)
Dept: MAMMOGRAPHY | Facility: HOSPITAL | Age: 72
Discharge: HOME | End: 2022-05-10
Attending: INTERNAL MEDICINE
Payer: MEDICARE

## 2022-05-10 DIAGNOSIS — D05.12 DUCTAL CARCINOMA IN SITU (DCIS) OF LEFT BREAST: ICD-10-CM

## 2022-05-10 PROCEDURE — 77062 BREAST TOMOSYNTHESIS BI: CPT | Performed by: INTERNAL MEDICINE

## 2022-05-10 PROCEDURE — 77066 DX MAMMO INCL CAD BI: CPT | Performed by: INTERNAL MEDICINE

## 2022-07-01 RX ORDER — LEVOTHYROXINE SODIUM 0.03 MG/1
TABLET ORAL
Qty: 90 TABLET | Refills: 0 | Status: SHIPPED | OUTPATIENT
Start: 2022-07-01

## 2022-08-04 ENCOUNTER — LAB ENCOUNTER (OUTPATIENT)
Dept: LAB | Facility: HOSPITAL | Age: 72
End: 2022-08-04
Attending: INTERNAL MEDICINE
Payer: MEDICARE

## 2022-08-04 DIAGNOSIS — I10 HYPERTENSION: Primary | ICD-10-CM

## 2022-08-04 DIAGNOSIS — E78.00 PURE HYPERCHOLESTEROLEMIA: ICD-10-CM

## 2022-08-04 LAB
BASOPHILS # BLD AUTO: 0.06 X10(3) UL (ref 0–0.2)
BASOPHILS NFR BLD AUTO: 1.1 %
CHOLEST SERPL-MCNC: 153 MG/DL (ref ?–200)
EOSINOPHIL # BLD AUTO: 0.12 X10(3) UL (ref 0–0.7)
EOSINOPHIL NFR BLD AUTO: 2.2 %
ERYTHROCYTE [DISTWIDTH] IN BLOOD BY AUTOMATED COUNT: 13.2 %
FASTING PATIENT LIPID ANSWER: YES
HCT VFR BLD AUTO: 38.7 %
HDLC SERPL-MCNC: 73 MG/DL (ref 40–59)
HGB BLD-MCNC: 12.8 G/DL
IMM GRANULOCYTES # BLD AUTO: 0.01 X10(3) UL (ref 0–1)
IMM GRANULOCYTES NFR BLD: 0.2 %
LDLC SERPL CALC-MCNC: 67 MG/DL (ref ?–100)
LYMPHOCYTES # BLD AUTO: 2.12 X10(3) UL (ref 1–4)
LYMPHOCYTES NFR BLD AUTO: 38.5 %
MCH RBC QN AUTO: 29.8 PG (ref 26–34)
MCHC RBC AUTO-ENTMCNC: 33.1 G/DL (ref 31–37)
MCV RBC AUTO: 90.2 FL
MONOCYTES # BLD AUTO: 0.39 X10(3) UL (ref 0.1–1)
MONOCYTES NFR BLD AUTO: 7.1 %
NEUTROPHILS # BLD AUTO: 2.81 X10 (3) UL (ref 1.5–7.7)
NEUTROPHILS # BLD AUTO: 2.81 X10(3) UL (ref 1.5–7.7)
NEUTROPHILS NFR BLD AUTO: 50.9 %
NONHDLC SERPL-MCNC: 80 MG/DL (ref ?–130)
PLATELET # BLD AUTO: 275 10(3)UL (ref 150–450)
RBC # BLD AUTO: 4.29 X10(6)UL
TRIGL SERPL-MCNC: 66 MG/DL (ref 30–149)
VLDLC SERPL CALC-MCNC: 10 MG/DL (ref 0–30)
WBC # BLD AUTO: 5.5 X10(3) UL (ref 4–11)

## 2022-08-04 PROCEDURE — 80061 LIPID PANEL: CPT

## 2022-08-04 PROCEDURE — 85025 COMPLETE CBC W/AUTO DIFF WBC: CPT

## 2022-08-04 PROCEDURE — 36415 COLL VENOUS BLD VENIPUNCTURE: CPT

## 2022-08-05 DIAGNOSIS — D05.12 DUCTAL CARCINOMA IN SITU (DCIS) OF LEFT BREAST: ICD-10-CM

## 2022-08-05 RX ORDER — ANASTROZOLE 1 MG/1
TABLET ORAL
Qty: 90 TABLET | Refills: 0 | Status: SHIPPED | OUTPATIENT
Start: 2022-08-05

## 2022-08-18 ENCOUNTER — TELEPHONE (OUTPATIENT)
Dept: INTERNAL MEDICINE CLINIC | Facility: CLINIC | Age: 72
End: 2022-08-18

## 2022-08-18 DIAGNOSIS — E78.00 PURE HYPERCHOLESTEROLEMIA: ICD-10-CM

## 2022-08-18 DIAGNOSIS — E03.9 HYPOTHYROIDISM, UNSPECIFIED TYPE: ICD-10-CM

## 2022-08-18 DIAGNOSIS — I10 PRIMARY HYPERTENSION: ICD-10-CM

## 2022-08-18 DIAGNOSIS — Z00.00 ROUTINE GENERAL MEDICAL EXAMINATION AT A HEALTH CARE FACILITY: Primary | ICD-10-CM

## 2022-08-18 NOTE — TELEPHONE ENCOUNTER
Future Appointments   Date Time Provider Savanna Beckford   12/23/2022  8:30 AM Annamaria Mejia MD [facility]     Patient is scheduled for [address]  Please place orders with THE [facility]. Patient aware to fast.  No call back required. Patient informed that labs need to be completed no sooner than 2 weeks prior to the appointment.

## 2022-08-19 ENCOUNTER — OFFICE VISIT (OUTPATIENT)
Dept: HEMATOLOGY/ONCOLOGY | Facility: HOSPITAL | Age: 72
End: 2022-08-19
Attending: INTERNAL MEDICINE
Payer: MEDICARE

## 2022-08-19 VITALS
RESPIRATION RATE: 18 BRPM | DIASTOLIC BLOOD PRESSURE: 77 MMHG | SYSTOLIC BLOOD PRESSURE: 154 MMHG | TEMPERATURE: 98 F | BODY MASS INDEX: 27.78 KG/M2 | OXYGEN SATURATION: 98 % | WEIGHT: 141.5 LBS | HEART RATE: 73 BPM | HEIGHT: 60 IN

## 2022-08-19 DIAGNOSIS — D05.12 DUCTAL CARCINOMA IN SITU (DCIS) OF LEFT BREAST: ICD-10-CM

## 2022-08-19 PROCEDURE — 99213 OFFICE O/P EST LOW 20 MIN: CPT | Performed by: INTERNAL MEDICINE

## 2022-08-19 RX ORDER — ANASTROZOLE 1 MG/1
1 TABLET ORAL DAILY
Qty: 90 TABLET | Refills: 3 | Status: SHIPPED | OUTPATIENT
Start: 2022-08-19

## 2022-08-19 NOTE — PROGRESS NOTES
Education Record    Learner:  Patient    Disease / [address] DCIS    Barriers / Limitations:  None   Comments:    Method:  Discussion   Comments:    General Topics:  Plan of care reviewed   Comments:    Outcome:  Shows understanding   Comments:  Breast imaging up to date. Pt taking anastrozole. Denies side effects. Feeling well. Nadia Kemp, RN  Outcome: Ongoing, Progressing

## 2022-10-15 NOTE — TELEPHONE ENCOUNTER
Last OV 1.7.22 w/ SD (virtual)   Last PE 11.30.21  Last REFILL 7.1.22 Levothyroxine 25mcg #90 0R  Last LABS No recent labs within last 12 months     Future Appointments   Date Time Provider Savanna Analy   12/23/2022  8:30 AM Annamaria Mejia MD [facility]         Per PROTOCOL?   Thyroid Supplements Protocol Failed 10/14/2022 10:02 AM   Protocol Details  TSH test in past 12 months    TSH value between 0.350 and 5.500 IU/ml    Appointment in past 12 or next 3 months         Please Advise

## 2022-10-17 NOTE — TELEPHONE ENCOUNTER
Patient calling to check on status of refill. Patient is out of medication.     Future Appointments   Date Time Provider Savanna Beckford   12/23/2022  8:30 AM Eliseo Tom MD [facility]

## 2022-10-18 RX ORDER — LEVOTHYROXINE SODIUM 0.03 MG/1
TABLET ORAL
Qty: 90 TABLET | Refills: 0 | Status: SHIPPED | OUTPATIENT
Start: 2022-10-18

## 2023-01-11 ENCOUNTER — OFFICE VISIT (OUTPATIENT)
Dept: INTERNAL MEDICINE CLINIC | Facility: CLINIC | Age: 73
End: 2023-01-11
Payer: MEDICARE

## 2023-01-11 VITALS
BODY MASS INDEX: 27.68 KG/M2 | HEIGHT: 60 IN | OXYGEN SATURATION: 97 % | HEART RATE: 78 BPM | RESPIRATION RATE: 18 BRPM | DIASTOLIC BLOOD PRESSURE: 74 MMHG | SYSTOLIC BLOOD PRESSURE: 134 MMHG | WEIGHT: 141 LBS

## 2023-01-11 DIAGNOSIS — Z99.89 OSA ON CPAP: ICD-10-CM

## 2023-01-11 DIAGNOSIS — R90.82 WHITE MATTER ABNORMALITY ON MRI OF BRAIN: ICD-10-CM

## 2023-01-11 DIAGNOSIS — Z00.00 ENCOUNTER FOR ANNUAL HEALTH EXAMINATION: ICD-10-CM

## 2023-01-11 DIAGNOSIS — M85.80 OSTEOPENIA, UNSPECIFIED LOCATION: ICD-10-CM

## 2023-01-11 DIAGNOSIS — R73.9 HYPERGLYCEMIA: ICD-10-CM

## 2023-01-11 DIAGNOSIS — Z12.11 ENCOUNTER FOR SCREENING FOR MALIGNANT NEOPLASM OF COLON: Primary | ICD-10-CM

## 2023-01-11 DIAGNOSIS — I10 PRIMARY HYPERTENSION: ICD-10-CM

## 2023-01-11 DIAGNOSIS — R00.2 PALPITATIONS: ICD-10-CM

## 2023-01-11 DIAGNOSIS — I49.1 PREMATURE ATRIAL CONTRACTIONS: ICD-10-CM

## 2023-01-11 DIAGNOSIS — E78.00 PURE HYPERCHOLESTEROLEMIA: ICD-10-CM

## 2023-01-11 DIAGNOSIS — E03.9 HYPOTHYROIDISM, UNSPECIFIED TYPE: ICD-10-CM

## 2023-01-11 DIAGNOSIS — G47.33 OSA ON CPAP: ICD-10-CM

## 2023-01-11 DIAGNOSIS — Q23.1 BICUSPID AORTIC VALVE: ICD-10-CM

## 2023-01-11 PROCEDURE — G0439 PPPS, SUBSEQ VISIT: HCPCS | Performed by: INTERNAL MEDICINE

## 2023-01-11 PROCEDURE — 1126F AMNT PAIN NOTED NONE PRSNT: CPT | Performed by: INTERNAL MEDICINE

## 2023-01-11 NOTE — PROGRESS NOTES
MEDICARE PT - AWV Completed 1/11/2023  Visual Aquity - Completed 1/11/2023  COLONOSCOPY - Due Referral Pended

## 2023-01-12 RX ORDER — LEVOTHYROXINE SODIUM 0.03 MG/1
TABLET ORAL
Qty: 90 TABLET | Refills: 1 | Status: SHIPPED | OUTPATIENT
Start: 2023-01-12

## 2023-01-23 ENCOUNTER — OFFICE VISIT (OUTPATIENT)
Dept: INTERNAL MEDICINE CLINIC | Facility: CLINIC | Age: 73
End: 2023-01-23
Payer: MEDICARE

## 2023-01-23 ENCOUNTER — TELEPHONE (OUTPATIENT)
Dept: INTERNAL MEDICINE CLINIC | Facility: CLINIC | Age: 73
End: 2023-01-23

## 2023-01-23 VITALS
WEIGHT: 141 LBS | HEIGHT: 60 IN | HEART RATE: 75 BPM | RESPIRATION RATE: 18 BRPM | BODY MASS INDEX: 27.68 KG/M2 | SYSTOLIC BLOOD PRESSURE: 120 MMHG | OXYGEN SATURATION: 98 % | DIASTOLIC BLOOD PRESSURE: 80 MMHG | TEMPERATURE: 97 F

## 2023-01-23 DIAGNOSIS — H11.31 SUBCONJUNCTIVAL HEMORRHAGE OF RIGHT EYE: Primary | ICD-10-CM

## 2023-01-23 DIAGNOSIS — R51.9 UNILATERAL HEADACHE: ICD-10-CM

## 2023-01-23 DIAGNOSIS — K06.8 PAIN IN GUMS: ICD-10-CM

## 2023-01-23 PROCEDURE — 99213 OFFICE O/P EST LOW 20 MIN: CPT | Performed by: INTERNAL MEDICINE

## 2023-01-23 RX ORDER — ACETAMINOPHEN 160 MG/5ML
325 SUSPENSION ORAL EVERY 4 HOURS PRN
Qty: 236 ML | Refills: 0 | Status: SHIPPED | OUTPATIENT
Start: 2023-01-23

## 2023-01-23 NOTE — TELEPHONE ENCOUNTER
Called and spoke to pt. Pt said that after she ate lunch, she noticed when she looked in the mirror her eye is all red/blood shot. Denies pain/itching/injury to eye. Has been having some \"twitching\" of that eye. Would like to come in for eval to make sure everything is ok. Offered 3:20 with MP. Pt agreeable.      FYI to MP

## 2023-01-23 NOTE — TELEPHONE ENCOUNTER
Patient states that when she woke up this morning she did not notice this but she just finished lunch and noticed that the R eye on the inside is like a pool of blood; almost feels like it's going to drip out.

## 2023-02-23 ENCOUNTER — LAB ENCOUNTER (OUTPATIENT)
Dept: LAB | Facility: HOSPITAL | Age: 73
End: 2023-02-23
Attending: INTERNAL MEDICINE
Payer: MEDICARE

## 2023-02-23 DIAGNOSIS — R00.2 PALPITATIONS: ICD-10-CM

## 2023-02-23 DIAGNOSIS — E78.00 PURE HYPERCHOLESTEROLEMIA: ICD-10-CM

## 2023-02-23 DIAGNOSIS — Z82.49 FAMILY HISTORY OF CORONARY ARTERIOSCLEROSIS: Primary | ICD-10-CM

## 2023-02-23 DIAGNOSIS — E03.9 HYPOTHYROIDISM: ICD-10-CM

## 2023-02-23 DIAGNOSIS — I10 HYPERTENSION, BENIGN: ICD-10-CM

## 2023-02-23 DIAGNOSIS — Z00.00 ROUTINE GENERAL MEDICAL EXAMINATION AT A HEALTH CARE FACILITY: ICD-10-CM

## 2023-02-23 DIAGNOSIS — I10 PRIMARY HYPERTENSION: ICD-10-CM

## 2023-02-23 DIAGNOSIS — I49.1 PREMATURE ATRIAL CONTRACTIONS: ICD-10-CM

## 2023-02-23 LAB
ALBUMIN SERPL-MCNC: 3.1 G/DL (ref 3.4–5)
ALBUMIN/GLOB SERPL: 0.8 {RATIO} (ref 1–2)
ALP LIVER SERPL-CCNC: 70 U/L
ALT SERPL-CCNC: 18 U/L
ANION GAP SERPL CALC-SCNC: 5 MMOL/L (ref 0–18)
AST SERPL-CCNC: 21 U/L (ref 15–37)
BASOPHILS # BLD AUTO: 0.07 X10(3) UL (ref 0–0.2)
BASOPHILS NFR BLD AUTO: 1.2 %
BILIRUB SERPL-MCNC: 0.4 MG/DL (ref 0.1–2)
BUN BLD-MCNC: 13 MG/DL (ref 7–18)
CALCIUM BLD-MCNC: 9.4 MG/DL (ref 8.5–10.1)
CHLORIDE SERPL-SCNC: 108 MMOL/L (ref 98–112)
CHOLEST SERPL-MCNC: 161 MG/DL (ref ?–200)
CO2 SERPL-SCNC: 28 MMOL/L (ref 21–32)
CREAT BLD-MCNC: 0.89 MG/DL
EOSINOPHIL # BLD AUTO: 0.13 X10(3) UL (ref 0–0.7)
EOSINOPHIL NFR BLD AUTO: 2.3 %
ERYTHROCYTE [DISTWIDTH] IN BLOOD BY AUTOMATED COUNT: 13.2 %
FASTING PATIENT LIPID ANSWER: YES
FASTING STATUS PATIENT QL REPORTED: YES
GFR SERPLBLD BASED ON 1.73 SQ M-ARVRAT: 68 ML/MIN/1.73M2 (ref 60–?)
GLOBULIN PLAS-MCNC: 3.8 G/DL (ref 2.8–4.4)
GLUCOSE BLD-MCNC: 84 MG/DL (ref 70–99)
HCT VFR BLD AUTO: 39.5 %
HDLC SERPL-MCNC: 85 MG/DL (ref 40–59)
HGB BLD-MCNC: 13 G/DL
IMM GRANULOCYTES # BLD AUTO: 0.02 X10(3) UL (ref 0–1)
IMM GRANULOCYTES NFR BLD: 0.4 %
LDLC SERPL CALC-MCNC: 65 MG/DL (ref ?–100)
LYMPHOCYTES # BLD AUTO: 2.02 X10(3) UL (ref 1–4)
LYMPHOCYTES NFR BLD AUTO: 35.5 %
MCH RBC QN AUTO: 29.6 PG (ref 26–34)
MCHC RBC AUTO-ENTMCNC: 32.9 G/DL (ref 31–37)
MCV RBC AUTO: 90 FL
MONOCYTES # BLD AUTO: 0.42 X10(3) UL (ref 0.1–1)
MONOCYTES NFR BLD AUTO: 7.4 %
NEUTROPHILS # BLD AUTO: 3.03 X10 (3) UL (ref 1.5–7.7)
NEUTROPHILS # BLD AUTO: 3.03 X10(3) UL (ref 1.5–7.7)
NEUTROPHILS NFR BLD AUTO: 53.2 %
NONHDLC SERPL-MCNC: 76 MG/DL (ref ?–130)
OSMOLALITY SERPL CALC.SUM OF ELEC: 291 MOSM/KG (ref 275–295)
PLATELET # BLD AUTO: 313 10(3)UL (ref 150–450)
POTASSIUM SERPL-SCNC: 4.1 MMOL/L (ref 3.5–5.1)
PROT SERPL-MCNC: 6.9 G/DL (ref 6.4–8.2)
RBC # BLD AUTO: 4.39 X10(6)UL
SODIUM SERPL-SCNC: 141 MMOL/L (ref 136–145)
T4 FREE SERPL-MCNC: 1 NG/DL (ref 0.8–1.7)
TRIGL SERPL-MCNC: 54 MG/DL (ref 30–149)
TSI SER-ACNC: 3.86 MIU/ML (ref 0.36–3.74)
VLDLC SERPL CALC-MCNC: 8 MG/DL (ref 0–30)
WBC # BLD AUTO: 5.7 X10(3) UL (ref 4–11)

## 2023-02-23 PROCEDURE — 84443 ASSAY THYROID STIM HORMONE: CPT

## 2023-02-23 PROCEDURE — 80053 COMPREHEN METABOLIC PANEL: CPT

## 2023-02-23 PROCEDURE — 80061 LIPID PANEL: CPT

## 2023-02-23 PROCEDURE — 84439 ASSAY OF FREE THYROXINE: CPT

## 2023-02-23 PROCEDURE — 85025 COMPLETE CBC W/AUTO DIFF WBC: CPT

## 2023-02-23 PROCEDURE — 36415 COLL VENOUS BLD VENIPUNCTURE: CPT

## 2023-02-25 VITALS
BODY MASS INDEX: 28 KG/M2 | RESPIRATION RATE: 16 BRPM | WEIGHT: 141.13 LBS | HEART RATE: 65 BPM | OXYGEN SATURATION: 98 % | TEMPERATURE: 98 F | SYSTOLIC BLOOD PRESSURE: 126 MMHG | DIASTOLIC BLOOD PRESSURE: 87 MMHG

## 2023-02-25 PROCEDURE — 99283 EMERGENCY DEPT VISIT LOW MDM: CPT

## 2023-02-25 PROCEDURE — 12001 RPR S/N/AX/GEN/TRNK 2.5CM/<: CPT

## 2023-02-26 ENCOUNTER — HOSPITAL ENCOUNTER (EMERGENCY)
Facility: HOSPITAL | Age: 73
Discharge: HOME | End: 2023-02-26
Attending: EMERGENCY MEDICINE
Payer: MEDICARE

## 2023-02-26 DIAGNOSIS — S61.219A LACERATION OF FINGER OF LEFT HAND WITHOUT FOREIGN BODY WITHOUT DAMAGE TO NAIL, UNSPECIFIED FINGER, INITIAL ENCOUNTER: Primary | ICD-10-CM

## 2023-02-26 PROCEDURE — 90471 IMMUNIZATION ADMIN: CPT

## 2023-03-01 ENCOUNTER — OFFICE VISIT (OUTPATIENT)
Dept: INTERNAL MEDICINE CLINIC | Facility: CLINIC | Age: 73
End: 2023-03-01
Payer: MEDICARE

## 2023-03-01 VITALS
HEIGHT: 60 IN | WEIGHT: 140.81 LBS | HEART RATE: 73 BPM | BODY MASS INDEX: 27.64 KG/M2 | DIASTOLIC BLOOD PRESSURE: 70 MMHG | OXYGEN SATURATION: 97 % | RESPIRATION RATE: 18 BRPM | SYSTOLIC BLOOD PRESSURE: 123 MMHG

## 2023-03-01 DIAGNOSIS — S61.211D LACERATION OF LEFT INDEX FINGER WITHOUT FOREIGN BODY WITHOUT DAMAGE TO NAIL, SUBSEQUENT ENCOUNTER: ICD-10-CM

## 2023-03-01 DIAGNOSIS — I10 PRIMARY HYPERTENSION: Primary | ICD-10-CM

## 2023-03-01 PROCEDURE — 99213 OFFICE O/P EST LOW 20 MIN: CPT | Performed by: NURSE PRACTITIONER

## 2023-03-08 ENCOUNTER — OFFICE VISIT (OUTPATIENT)
Dept: INTERNAL MEDICINE CLINIC | Facility: CLINIC | Age: 73
End: 2023-03-08
Payer: MEDICARE

## 2023-03-08 VITALS
BODY MASS INDEX: 29.01 KG/M2 | WEIGHT: 142 LBS | RESPIRATION RATE: 18 BRPM | HEIGHT: 58.66 IN | OXYGEN SATURATION: 98 % | HEART RATE: 68 BPM | SYSTOLIC BLOOD PRESSURE: 126 MMHG | TEMPERATURE: 97 F | DIASTOLIC BLOOD PRESSURE: 74 MMHG

## 2023-03-08 DIAGNOSIS — I10 PRIMARY HYPERTENSION: Primary | ICD-10-CM

## 2023-03-08 DIAGNOSIS — S61.211D LACERATION OF LEFT INDEX FINGER WITHOUT FOREIGN BODY WITHOUT DAMAGE TO NAIL, SUBSEQUENT ENCOUNTER: ICD-10-CM

## 2023-03-08 PROCEDURE — 1126F AMNT PAIN NOTED NONE PRSNT: CPT | Performed by: NURSE PRACTITIONER

## 2023-03-08 PROCEDURE — 99213 OFFICE O/P EST LOW 20 MIN: CPT | Performed by: NURSE PRACTITIONER

## 2023-06-12 ENCOUNTER — HOSPITAL ENCOUNTER (OUTPATIENT)
Dept: MAMMOGRAPHY | Facility: HOSPITAL | Age: 73
Discharge: HOME | End: 2023-06-12
Attending: INTERNAL MEDICINE
Payer: MEDICARE

## 2023-06-12 DIAGNOSIS — D05.12 DUCTAL CARCINOMA IN SITU (DCIS) OF LEFT BREAST: ICD-10-CM

## 2023-06-12 PROCEDURE — 77066 DX MAMMO INCL CAD BI: CPT | Performed by: INTERNAL MEDICINE

## 2023-06-12 PROCEDURE — 77062 BREAST TOMOSYNTHESIS BI: CPT | Performed by: INTERNAL MEDICINE

## 2023-06-23 RX ORDER — LEVOTHYROXINE SODIUM 0.03 MG/1
TABLET ORAL
Qty: 90 TABLET | Refills: 0 | Status: SHIPPED | OUTPATIENT
Start: 2023-06-23

## 2023-06-26 RX ORDER — LEVOTHYROXINE SODIUM 0.03 MG/1
TABLET ORAL
Qty: 90 TABLET | Refills: 0 | OUTPATIENT
Start: 2023-06-26

## 2023-08-17 ENCOUNTER — LAB ENCOUNTER (OUTPATIENT)
Dept: LAB | Facility: HOSPITAL | Age: 73
End: 2023-08-17
Attending: INTERNAL MEDICINE
Payer: MEDICARE

## 2023-08-17 DIAGNOSIS — R00.2 PALPITATIONS: ICD-10-CM

## 2023-08-17 DIAGNOSIS — E78.00 PURE HYPERCHOLESTEROLEMIA: ICD-10-CM

## 2023-08-17 DIAGNOSIS — I10 HTN (HYPERTENSION), BENIGN: ICD-10-CM

## 2023-08-17 DIAGNOSIS — I49.1 PREMATURE ATRIAL CONTRACTIONS: ICD-10-CM

## 2023-08-17 DIAGNOSIS — E03.9 HYPOTHYROIDISM: ICD-10-CM

## 2023-08-17 DIAGNOSIS — Z82.49 FAMILY HISTORY OF CORONARY ARTERIOSCLEROSIS: Primary | ICD-10-CM

## 2023-08-17 LAB
ALBUMIN SERPL-MCNC: 3.4 G/DL (ref 3.4–5)
ALBUMIN/GLOB SERPL: 1 {RATIO} (ref 1–2)
ALP LIVER SERPL-CCNC: 71 U/L
ALT SERPL-CCNC: 19 U/L
ANION GAP SERPL CALC-SCNC: 6 MMOL/L (ref 0–18)
AST SERPL-CCNC: 19 U/L (ref 15–37)
BILIRUB SERPL-MCNC: 0.6 MG/DL (ref 0.1–2)
BUN BLD-MCNC: 13 MG/DL (ref 7–18)
CALCIUM BLD-MCNC: 8.9 MG/DL (ref 8.5–10.1)
CHLORIDE SERPL-SCNC: 106 MMOL/L (ref 98–112)
CHOLEST SERPL-MCNC: 143 MG/DL (ref ?–200)
CO2 SERPL-SCNC: 25 MMOL/L (ref 21–32)
CREAT BLD-MCNC: 0.92 MG/DL
EGFRCR SERPLBLD CKD-EPI 2021: 65 ML/MIN/1.73M2 (ref 60–?)
FASTING PATIENT LIPID ANSWER: YES
FASTING STATUS PATIENT QL REPORTED: YES
GLOBULIN PLAS-MCNC: 3.3 G/DL (ref 2.8–4.4)
GLUCOSE BLD-MCNC: 81 MG/DL (ref 70–99)
HDLC SERPL-MCNC: 72 MG/DL (ref 40–59)
LDLC SERPL CALC-MCNC: 59 MG/DL (ref ?–100)
NONHDLC SERPL-MCNC: 71 MG/DL (ref ?–130)
OSMOLALITY SERPL CALC.SUM OF ELEC: 283 MOSM/KG (ref 275–295)
POTASSIUM SERPL-SCNC: 3.4 MMOL/L (ref 3.5–5.1)
PROT SERPL-MCNC: 6.7 G/DL (ref 6.4–8.2)
SODIUM SERPL-SCNC: 137 MMOL/L (ref 136–145)
TRIGL SERPL-MCNC: 56 MG/DL (ref 30–149)
TSI SER-ACNC: 1.8 MIU/ML (ref 0.36–3.74)
VLDLC SERPL CALC-MCNC: 8 MG/DL (ref 0–30)

## 2023-08-17 PROCEDURE — 36415 COLL VENOUS BLD VENIPUNCTURE: CPT

## 2023-08-17 PROCEDURE — 84443 ASSAY THYROID STIM HORMONE: CPT

## 2023-08-17 PROCEDURE — 80053 COMPREHEN METABOLIC PANEL: CPT

## 2023-08-17 PROCEDURE — 80061 LIPID PANEL: CPT

## 2023-09-25 ENCOUNTER — TELEPHONE (OUTPATIENT)
Dept: INTERNAL MEDICINE CLINIC | Facility: CLINIC | Age: 73
End: 2023-09-25

## 2023-09-25 ENCOUNTER — OFFICE VISIT (OUTPATIENT)
Dept: INTERNAL MEDICINE CLINIC | Facility: CLINIC | Age: 73
End: 2023-09-25
Payer: MEDICARE

## 2023-09-25 VITALS
HEIGHT: 58.66 IN | DIASTOLIC BLOOD PRESSURE: 68 MMHG | RESPIRATION RATE: 20 BRPM | SYSTOLIC BLOOD PRESSURE: 128 MMHG | OXYGEN SATURATION: 97 % | WEIGHT: 140.19 LBS | HEART RATE: 88 BPM | BODY MASS INDEX: 28.64 KG/M2

## 2023-09-25 DIAGNOSIS — H92.01 RIGHT EAR PAIN: Primary | ICD-10-CM

## 2023-09-25 DIAGNOSIS — H61.23 BILATERAL IMPACTED CERUMEN: ICD-10-CM

## 2023-09-25 DIAGNOSIS — E78.00 PURE HYPERCHOLESTEROLEMIA: ICD-10-CM

## 2023-09-25 DIAGNOSIS — Z00.00 ROUTINE GENERAL MEDICAL EXAMINATION AT A HEALTH CARE FACILITY: Primary | ICD-10-CM

## 2023-09-25 DIAGNOSIS — I10 PRIMARY HYPERTENSION: ICD-10-CM

## 2023-09-25 DIAGNOSIS — H91.91 HEARING LOSS OF RIGHT EAR, UNSPECIFIED HEARING LOSS TYPE: ICD-10-CM

## 2023-09-25 DIAGNOSIS — H66.91 OTITIS OF RIGHT EAR: ICD-10-CM

## 2023-09-25 RX ORDER — LEVOTHYROXINE SODIUM 0.03 MG/1
TABLET ORAL
Qty: 90 TABLET | Refills: 1 | Status: SHIPPED | OUTPATIENT
Start: 2023-09-25

## 2023-09-25 RX ORDER — FLUTICASONE PROPIONATE 50 MCG
1 SPRAY, SUSPENSION (ML) NASAL 2 TIMES DAILY
Qty: 1 EACH | Refills: 0 | Status: SHIPPED | OUTPATIENT
Start: 2023-09-25

## 2023-09-25 RX ORDER — AZITHROMYCIN 250 MG/1
TABLET, FILM COATED ORAL
Qty: 6 TABLET | Refills: 0 | Status: SHIPPED | OUTPATIENT
Start: 2023-09-25 | End: 2023-09-25

## 2023-09-25 NOTE — TELEPHONE ENCOUNTER
Orders to       Kayleen Li        Pt aware to get labs done no sooner than 2 weeks prior to the appt. Pt aware to fast.  No call back required. .  Future Appointments   Date Time Provider Savanna Analy   1/10/2024  1:30 PM Olya Lerma MD [facility]

## 2023-09-25 NOTE — TELEPHONE ENCOUNTER
Thyroid Supplements Protocol [id number]/25/2023 12:38 PM   Protocol Details TSH test in past 12 months    TSH value between 0.350 and 5.500 IU/ml    Appointment in past 12 or next 3 months            Component  Ref Range & Units 8/17/23  9:27 AM   TSH  0.358 - 3.740 mIU/mL

## 2023-10-31 DIAGNOSIS — D05.12 DUCTAL CARCINOMA IN SITU (DCIS) OF LEFT BREAST: ICD-10-CM

## 2023-10-31 RX ORDER — ANASTROZOLE 1 MG/1
1 TABLET ORAL DAILY
Qty: 90 TABLET | Refills: 0 | Status: SHIPPED | OUTPATIENT
Start: 2023-10-31

## 2023-11-14 NOTE — PROGRESS NOTES
[facility] Progress Note      Patient Name:  Macario Ag  YOB: 1950  Medical Record Number:  [id number]    Date of visit: 11/17/2023      CHIEF COMPLAINT: L breast DCIS s/p lumpectomy. HPI:     74year old postmenopausal female that I have followed since 1/20 after she underwent work-up of abnormal  mammogram.  Biopsy-9 mm and 4 mm of DCIS, ER/NM+. Lumpectomy 12/19. Pathology-2 mm residual DCIS. Declined RT. Started anastrozole 1/20. No new complaint. Few hot flashes. SOCIAL HISTORY:    , 2 children, 4 grandchildren. ROS:     Constitutional: fatigue  Neurologic: no headache, seizures, diplopia or weakness  Respiratory: no cough, SOB or hemoptysis  Cardiovascular: no chest pain, ankle swelling, FLORES  GI: no nausea, vomiting, diarrhea or BRBPR  Musculoskeletal: no body-ache or joint pain  Dermatologic: no alopecia, rash, pruritis  : no hematuria, dysuria or frequency  Psych: no confusion or depression   Heme: no easy bruising or bleeding     Breast exam: Both breasts are soft, dense, mildly lumpy texture but no discretely palpable masses. No axillary adenopathy. ALLERGIES:    Allergies   Allergen Reactions    Epinephrine PALPITATIONS       MEDICATIONS:    Current Outpatient Medications   Medication Sig Dispense Refill    anastrozole 1 MG Oral Tab tab Take 1 tablet (1 mg total) by mouth daily. 90 tablet 0    fluticasone propionate 50 MCG/ACT Nasal Suspension 1 spray by Nasal route 2 (two) times daily. 1 each 0    levothyroxine 25 MCG Oral Tab TAKE 1 TABLET(25 MCG) BY MOUTH DAILY 90 tablet 1    acetaminophen 160 MG/5ML Oral Liquid Take 10.2 mL (325 mg total) by mouth every 4 (four) hours as needed for Fever. 236 mL 0    amLODIPine 5 MG Oral Tab Take 0.5 tablets (2.5 mg total) by mouth as needed. Olmesartan Medoxomil 20 MG Oral Tab Take 1 tablet (20 mg total) by mouth daily.       Metoprolol Succinate ER 25 MG Oral Tablet 24 Hr Take 1 tablet (25 mg total) by mouth daily. 1/2 tablet  5    hydrochlorothiazide 25 MG Oral Tab Take 1 tablet (25 mg total) by mouth daily. Cholecalciferol 1000 UNITS Oral Chew Tab Chew 2 tablets by mouth daily. aspirin 81 MG Oral Tab Take 1 tablet (81 mg total) by mouth daily.  0    Multiple Vitamins-Minerals (MULTIVITAMIN OR) Take by mouth as needed. VITALS:    There were no vitals filed for this visit. PS:  ECO    PHYSICAL EXAM:    General: alert and oriented x 3, not in acute distress. HEENT: AMIRAH, oropharynx  clear. Neck: supple. No JVD /adenopathy. CVS: S1S2, regular  Rhythm, no murmurs. Lungs: Clear to auscultation and percussion. Abdomen: Soft, non tender, no hepato-splenomegaly. Extremities:  No edema. CNS: no focal deficit  Breasts: Both breasts were soft. Left shows well-healed lumpectomy scar. No masses or axillary adenopathy. Some rash posterior axillary fold right side. Emotional well being: Patient's emotional well being was assessed. No issues requiring acute psychosocial intervention were identified. LABS:     No results found for this or any previous visit (from the past 24 hour(s)). ASSESSMENT AND PLAN:     # L breast DCIS s/p lumpectomy:  DCIS 9 mm, and 4 mm of DCIS on bx and residual 2 mm dual DCIS on lumpectomy ,  ER/WI+. Declined RT. Started anastrozole , continue till . Mammogram  okay, next due . # Osteopenia: DEXA  showed stable osteopenia with T score -1.9, next due . ORDERS PLACED:        Procedures    [facility] RAMON 2D+3D DIAGNOSTIC TIKA  BILAT (CPT=77066/52851)    XR DEXA BONE DENSITOMETRY (CPT=77080)     Requested Prescriptions     Signed Prescriptions Disp Refills    anastrozole 1 MG Oral Tab tab 90 tablet 3     Sig: Take 1 tablet (1 mg total) by mouth daily. Return in about 1 year (around 2024) for MD visit. Cordelia So M.D.     [facility]    [address] [place], [place], [address]    11/17/2023

## 2023-11-17 ENCOUNTER — OFFICE VISIT (OUTPATIENT)
Dept: HEMATOLOGY/ONCOLOGY | Facility: HOSPITAL | Age: 73
End: 2023-11-17
Attending: INTERNAL MEDICINE
Payer: MEDICARE

## 2023-11-17 VITALS
RESPIRATION RATE: 16 BRPM | WEIGHT: 141 LBS | BODY MASS INDEX: 29 KG/M2 | DIASTOLIC BLOOD PRESSURE: 95 MMHG | HEART RATE: 78 BPM | SYSTOLIC BLOOD PRESSURE: 162 MMHG | TEMPERATURE: 97 F | OXYGEN SATURATION: 99 %

## 2023-11-17 DIAGNOSIS — D05.12 DUCTAL CARCINOMA IN SITU (DCIS) OF LEFT BREAST: ICD-10-CM

## 2023-11-17 DIAGNOSIS — T50.905D ADVERSE EFFECT OF DRUG, SUBSEQUENT ENCOUNTER: ICD-10-CM

## 2023-11-17 DIAGNOSIS — Z78.0 POSTMENOPAUSAL: Primary | ICD-10-CM

## 2023-11-17 PROCEDURE — 99214 OFFICE O/P EST MOD 30 MIN: CPT | Performed by: INTERNAL MEDICINE

## 2023-11-17 RX ORDER — ANASTROZOLE 1 MG/1
1 TABLET ORAL DAILY
Qty: 90 TABLET | Refills: 3 | Status: SHIPPED | OUTPATIENT
Start: 2023-11-17

## 2023-11-17 NOTE — PROGRESS NOTES
Education Record    Learner:  Patient    Disease / [address] breast DCIS      Barriers / Limitations:  None   Comments:    Method:  Discussion   Comments:    General Topics:  Plan of care reviewed   Comments:    Outcome:  Shows understanding   Comments:   Breast imaging up to date. Taking anastrazole. Pt tolerating well. Some muscle pain, but attributes to her age, had it prior to starting.

## 2024-01-10 ENCOUNTER — OFFICE VISIT (OUTPATIENT)
Dept: INTERNAL MEDICINE CLINIC | Facility: CLINIC | Age: 74
End: 2024-01-10
Payer: MEDICARE

## 2024-01-10 VITALS
WEIGHT: 139.63 LBS | TEMPERATURE: 98 F | BODY MASS INDEX: 29 KG/M2 | DIASTOLIC BLOOD PRESSURE: 80 MMHG | OXYGEN SATURATION: 99 % | HEART RATE: 71 BPM | SYSTOLIC BLOOD PRESSURE: 132 MMHG

## 2024-01-10 DIAGNOSIS — R90.82 WHITE MATTER ABNORMALITY ON MRI OF BRAIN: ICD-10-CM

## 2024-01-10 DIAGNOSIS — M85.80 OSTEOPENIA, UNSPECIFIED LOCATION: ICD-10-CM

## 2024-01-10 DIAGNOSIS — Z00.00 ENCOUNTER FOR ANNUAL HEALTH EXAMINATION: ICD-10-CM

## 2024-01-10 DIAGNOSIS — I49.1 PREMATURE ATRIAL CONTRACTIONS: ICD-10-CM

## 2024-01-10 DIAGNOSIS — I10 HYPERTENSION, BENIGN: ICD-10-CM

## 2024-01-10 DIAGNOSIS — Q23.1 BICUSPID AORTIC VALVE: Primary | ICD-10-CM

## 2024-01-10 DIAGNOSIS — E03.9 ACQUIRED HYPOTHYROIDISM: ICD-10-CM

## 2024-01-10 DIAGNOSIS — R73.9 HYPERGLYCEMIA: ICD-10-CM

## 2024-01-10 DIAGNOSIS — Z85.3 HISTORY OF BREAST CANCER IN FEMALE: ICD-10-CM

## 2024-01-10 DIAGNOSIS — E78.00 PURE HYPERCHOLESTEROLEMIA: ICD-10-CM

## 2024-01-10 PROCEDURE — 1126F AMNT PAIN NOTED NONE PRSNT: CPT | Performed by: INTERNAL MEDICINE

## 2024-01-10 PROCEDURE — G0439 PPPS, SUBSEQ VISIT: HCPCS | Performed by: INTERNAL MEDICINE

## 2024-01-16 PROBLEM — R00.2 PALPITATIONS: Status: RESOLVED | Noted: 2018-06-05 | Resolved: 2024-01-16

## 2024-01-16 NOTE — PROGRESS NOTES
Subjective:   Lisa Malone is a 73 year old female who presents for a Medicare Subsequent Annual Wellness visit (Pt already had Initial Annual Wellness) and scheduled follow up of multiple significant but stable problems.   Here for awv and f/u of htn, history of breast ca, bicuspid av, swati on cpap. Pt is stable and doning well. Labs stable. Pt refusing f/u c-scope due to history of poor tolerance to prep.     History/Other:   Fall Risk Assessment:   She has been screened for Falls and is low risk.      Cognitive Assessment:   She had a completely normal cognitive assessment - see flowsheet entries     Functional Ability/Status:   Lisa Malone has a completely normal functional assessment. See flowsheet for details.        Depression Screening (PHQ-2/PHQ-9): PHQ-2 SCORE: 0, done 1/10/2024             Advanced Directives:   She does have a Living Will but we do NOT have it on file in Epic.    She does NOT have a Power of  for Health Care. [Do you have a healthcare power of ?: No]  Discussed Advance Care Planning with patient (and family/surrogate if present). Standard forms made available to patient in After Visit Summary.      Patient Active Problem List   Diagnosis    Hypertension, benign    Hypothyroidism    White matter abnormality on MRI of brain    Osteopenia    Hyperglycemia    SWATI on CPAP    Pure hypercholesterolemia    Premature atrial contractions    Bicuspid aortic valve    History of breast cancer in female     Allergies:  She is allergic to epinephrine.    Current Medications:  Outpatient Medications Marked as Taking for the 1/10/24 encounter (Office Visit) with Schriedel, Adam, MD   Medication Sig    anastrozole 1 MG Oral Tab tab Take 1 tablet (1 mg total) by mouth daily.    levothyroxine 25 MCG Oral Tab TAKE 1 TABLET(25 MCG) BY MOUTH DAILY    Olmesartan Medoxomil 20 MG Oral Tab Take 1 tablet (20 mg total) by mouth daily.    Metoprolol Succinate ER 25 MG Oral Tablet 24 Hr Take 1  tablet (25 mg total) by mouth daily. 1/2 tablet    hydrochlorothiazide 25 MG Oral Tab Take 1 tablet (25 mg total) by mouth daily.    Cholecalciferol 1000 UNITS Oral Chew Tab Chew 2 tablets by mouth daily.    aspirin 81 MG Oral Tab Take 1 tablet (81 mg total) by mouth daily.    Multiple Vitamins-Minerals (MULTIVITAMIN OR) Take by mouth as needed.         Medical History:  She  has a past medical history of Abnormal EKG (2015), Amenorrhea, Anesthesia complication, Breast CA (HCC) (2019), Cancer (HCC) (2019), Ductal carcinoma in situ of breast, Family history of coronary arteriosclerosis (10/31/2011), Fibroids, H/O bone density study (2013), H/O bone scan (2016), H/O mammogram (2014), H/O mammogram (2015), Hyperthyroidism, Hypothyroidism, MVP (mitral valve prolapse), AUDREY (obstructive sleep apnea), PAC (premature atrial contraction), Palpitations, and Unspecified essential hypertension.  Surgical History:  She  has a past surgical history that includes remv cartilage for graft nasal (); Colectomy (); other surgical history (); other surgical history (); other surgical history (Right, ); alex localization wire 1 site left (cpt=19281) (); alex localization wire 1 site right (cpt=19281) (); appendectomy; laparoscopy,pelvic,biopsy (); appendectomy (); colonoscopy (); ; colonoscopy (2017); colonoscopy (N/A, 2017); lumpectomy left (2019); and other.   Family History:  Her family history includes Breast Cancer (age of onset: 64) in her sister; Breast Cancer (age of onset: 69) in her self; Cancer in her brother; DCIS in her self; Diabetes in her brother, maternal grandmother, and mother; Heart Attack in her father; Heart Disorder in her mother; Hypertension in her father and mother; Infectious Disease in her maternal grandmother; Stroke in her brother.  Social History:  She  reports that she quit smoking about 53 years ago. Her smoking use  included cigarettes. She has a 1 pack-year smoking history. She has never used smokeless tobacco. She reports current alcohol use. She reports that she does not use drugs.    Tobacco:  She smoked tobacco in the past but quit greater than 12 months ago.  Social History    Tobacco Use      Smoking status: Former        Packs/day: 0.50        Years: 2.00        Additional pack years: 0.00        Total pack years: 1.00        Types: Cigarettes        Quit date: 1970        Years since quittin.1      Smokeless tobacco: Never         CAGE Alcohol Screen:   CAGE screening score of 0 on 1/10/2024, showing low risk of alcohol abuse.      Patient Care Team:  Schriedel, Adam, MD as PCP - General  Jeaneth Ahumada MD as Consulting Physician (Cardiovascular Diseases)  Yaw Alonso MD as Consulting Physician (NEUROLOGY)  Italo Pa MD (OBSTETRICS & GYNECOLOGY)  Phoenix Mcclelland NP (Nurse Practitioner)  Khurram Rivas MD (GASTROENTEROLOGY)  Lou Montoya MD (Radiation Oncology)  Maryan Mccurdy, RN as Registered Nurse  Iliana Lee RN as Registered Nurse (Registered Nurse)  Vel Wallis, PT, DPT, OCS, Cert MDT  as Physical Therapist (Physical Therapy)  Bryanna Ahumada APRN (Nurse Practitioner Acute Care)    Review of Systems  GENERAL: feels well otherwise  SKIN: denies any unusual skin lesions  EYES: denies blurred vision or double vision  HEENT: denies nasal congestion, sinus pain or ST  LUNGS: denies shortness of breath with exertion  CARDIOVASCULAR: denies chest pain on exertion  GI: denies abdominal pain, denies heartburn  : denies dysuria, vaginal discharge or itching, no complaint of urinary incontinence   MUSCULOSKELETAL: denies back pain  NEURO: denies headaches  PSYCHE: denies depression or anxiety  HEMATOLOGIC: denies hx of anemia  ENDOCRINE: denies thyroid history  ALL/ASTHMA: denies hx of allergy or asthma    Objective:   Physical Exam  General Appearance:  Alert, cooperative, no distress,  appears stated age   Head:  Normocephalic, without obvious abnormality, atraumatic   Eyes:  PERRL, conjunctiva/corneas clear, EOM's intact both eyes   Ears:  Normal TM's and external ear canals, both ears   Nose: Nares normal, septum midline,mucosa normal, no drainage or sinus tenderness   Throat: Lips, mucosa, and tongue normal; teeth and gums normal   Neck: Supple, symmetrical, trachea midline, no adenopathy;  thyroid: not enlarged, symmetric, no tenderness/mass/nodules; no carotid bruit or JVD   Back:   Symmetric, no curvature, ROM normal, no CVA tenderness   Lungs:   Clear to auscultation bilaterally, respirations unlabored   Heart:  Regular rate and rhythm, S1 and S2 normal, no murmur, rub, or gallop   Abdomen:   Soft, non-tender, bowel sounds active all four quadrants,  no masses, no organomegaly   Pelvic: Deferred   Extremities: Extremities normal, atraumatic, no cyanosis or edema   Pulses: 2+ and symmetric   Skin: Skin color, texture, turgor normal, no rashes or lesions   Lymph nodes: Cervical, supraclavicular, and axillary nodes normal   Neurologic: Normal       /80   Pulse 71   Temp 97.5 °F (36.4 °C) (Temporal)   Wt 139 lb 9.6 oz (63.3 kg)   LMP  (LMP Unknown)   SpO2 99%   BMI 28.52 kg/m²  Estimated body mass index is 28.52 kg/m² as calculated from the following:    Height as of 9/25/23: 4' 10.66\" (1.49 m).    Weight as of this encounter: 139 lb 9.6 oz (63.3 kg).    Medicare Hearing Assessment:   Hearing Screening    Time taken: 1/10/2024  4:00 PM  Entry User: Hetal Bang  Screening Method: Finger Rub  Finger Rub Result: Pass               Visual Acuity:   Right Eye Visual Acuity: Uncorrected Right Eye Chart Acuity: 20/15   Left Eye Visual Acuity: Uncorrected Left Eye Chart Acuity: 20/20   Both Eyes Visual Acuity: Uncorrected Both Eyes Chart Acuity: 20/15   Able To Tolerate Visual Acuity: Yes        Assessment & Plan:   Lisa Malone is a 73 year old female who presents for a Medicare  Assessment.     1. Bicuspid aortic valve (Primary)-stable ocntineu f/uw with cards and f/u echo, no symptoms  2. Hypertension, benign-stable continue meds well controlled  3. Premature atrial contractions-stable continue obseration  4. Hyperglycemia-see above  5. Pure hypercholesterolemia-stable controlled ocntineu meds labs reviwed  6. Acquired hypothyroidism-see above  7. White matter abnormality on MRI of brain-stable no syptoms currently  8. Osteopenia, unspecified location-stable contineue observation  9. History of breast cancer in female-[facility] vivienne f/u with her surgeon   10. Encounter for annual health examination    The patient indicates understanding of these issues and agrees to the plan.  Reinforced healthy diet, lifestyle, and exercise.      Return in 6 months (on 7/10/2024).     Adam Schriedel, MD, 1/16/2024     Supplementary Documentation:   General Health:  In the past six months, have you lost more than 10 pounds without trying?: 2 - No  Has your appetite been poor?: No  Type of Diet: Balanced  How does the patient maintain a good energy level?: Daily Walks  How would you describe your daily physical activity?: Moderate  How would you describe your current health state?: Good  How do you maintain positive mental well-being?: Social Interaction;Visiting Family;Puzzles;Games;Visiting Friends  On a scale of 0 to 10, with 0 being no pain and 10 being severe pain, what is your pain level?: 0 - (None)  In the past six months, have you experienced urine leakage?: 0-No  At any time do you feel concerned for the safety/well-being of yourself and/or your children, in your home or elsewhere?: No  Have you had any immunizations at another office such as Influenza, Hepatitis B, Tetanus, or Pneumococcal?: No       Lisa Malone's SCREENING SCHEDULE   Tests on this list are recommended by your physician but may not be covered, or covered at this frequency, by your insurer.   Please check with your insurance  carrier before scheduling to verify coverage.   PREVENTATIVE SERVICES FREQUENCY &  COVERAGE DETAILS LAST COMPLETION DATE   Diabetes Screening    Fasting Blood Sugar /  Glucose    One screening every 12 months if never tested or if previously tested but not diagnosed with pre-diabetes   One screening every 6 months if diagnosed with pre-diabetes Lab Results   Component Value Date    GLU 81 08/17/2023        Cardiovascular Disease Screening    Lipid Panel  Cholesterol  Lipoprotein (HDL)  Triglycerides Covered every 5 years for all Medicare beneficiaries without apparent signs or symptoms of cardiovascular disease Lab Results   Component Value Date    CHOLEST 143 08/17/2023    HDL 72 (H) 08/17/2023    LDL 59 08/17/2023    TRIG 56 08/17/2023         Electrocardiogram (EKG)   Covered if needed at Welcome to Medicare, and non-screening if indicated for medical reasons 01/26/2021      Ultrasound Screening for Abdominal Aortic Aneurysm (AAA) Covered once in a lifetime for one of the following risk factors    Men who are 65-75 years old and have ever smoked    Anyone with a family history -     Colorectal Cancer Screening  Covered for ages 50-85; only need ONE of the following:    Colonoscopy   Covered every 10 years    Covered every 2 years if patient is at high risk or previous colonoscopy was abnormal 12/07/2017    Health Maintenance   Topic Date Due    Colorectal Cancer Screening  12/07/2022       Flexible Sigmoidoscopy   Covered every 4 years -    Fecal Occult Blood Test Covered annually -   Bone Density Screening    Bone density screening    Covered every 2 years after age 65 if diagnosed with risk of osteoporosis or estrogen deficiency.    Covered yearly for long-term glucocorticoid medication use (Steroids) Last Dexa Scan:    XR DEXA BONE DENSITOMETRY (CPT=77080) 04/27/2022      No recommendations at this time   Pap and Pelvic    Pap   Covered every 2 years for women at normal risk; Annually if at high risk -  No  recommendations at this time    Chlamydia Annually if high risk -  No recommendations at this time   Screening Mammogram    Mammogram     Recommend annually for all female patients aged 40 and older    One baseline mammogram covered for patients aged 35-39 06/12/2023    Health Maintenance   Topic Date Due    Mammogram  Discontinued       Immunizations    Influenza Covered once per flu season  Please get every year 10/02/2023  No recommendations at this time    Pneumococcal Each vaccine (Pqbabdd22 & Olufqcjtf51) covered once after 65 Prevnar 13: 01/12/2016    [id number]: 11/27/2013     No recommendations at this time    Hepatitis B One screening covered for patients with certain risk factors   -  No recommendations at this time    Tetanus Toxoid Not covered by Medicare Part B unless medically necessary (cut with metal); may be covered with your pharmacy prescription benefits -    Tetanus, Diptheria and Pertusis TD and TDaP Not covered by Medicare Part B -  No recommendations at this time    Zoster Not covered by Medicare Part B; may be covered with your pharmacy  prescription benefits -  Zoster Vaccines(1 of 2) Never done     Annual Monitoring of Persistent Medications (ACE/ARB, digoxin diuretics, anticonvulsants)    Potassium Annually Lab Results   Component Value Date    K 3.4 (L) 08/17/2023         Creatinine   Annually Lab Results   Component Value Date    CREATSERUM 0.92 08/17/2023         BUN Annually Lab Results   Component Value Date    BUN 13 08/17/2023       Drug Serum Conc Annually No results found for: \"DIGOXIN\", \"DIG\", \"VALP\"

## 2024-01-16 NOTE — PATIENT INSTRUCTIONS
Lisa Malone's SCREENING SCHEDULE   Tests on this list are recommended by your physician but may not be covered, or covered at this frequency, by your insurer.   Please check with your insurance carrier before scheduling to verify coverage.   PREVENTATIVE SERVICES FREQUENCY &  COVERAGE DETAILS LAST COMPLETION DATE   Diabetes Screening    Fasting Blood Sugar /  Glucose    One screening every 12 months if never tested or if previously tested but not diagnosed with pre-diabetes   One screening every 6 months if diagnosed with pre-diabetes Lab Results   Component Value Date    GLU 81 08/17/2023        Cardiovascular Disease Screening    Lipid Panel  Cholesterol  Lipoprotein (HDL)  Triglycerides Covered every 5 years for all Medicare beneficiaries without apparent signs or symptoms of cardiovascular disease Lab Results   Component Value Date    CHOLEST 143 08/17/2023    HDL 72 (H) 08/17/2023    LDL 59 08/17/2023    TRIG 56 08/17/2023         Electrocardiogram (EKG)   Covered if needed at Welcome to Medicare, and non-screening if indicated for medical reasons 01/26/2021      Ultrasound Screening for Abdominal Aortic Aneurysm (AAA) Covered once in a lifetime for one of the following risk factors   • Men who are 65-75 years old and have ever smoked   • Anyone with a family history -     Colorectal Cancer Screening  Covered for ages 50-85; only need ONE of the following:    Colonoscopy   Covered every 10 years    Covered every 2 years if patient is at high risk or previous colonoscopy was abnormal 12/07/2017    Health Maintenance   Topic Date Due   • Colorectal Cancer Screening  12/07/2022       Flexible Sigmoidoscopy   Covered every 4 years -    Fecal Occult Blood Test Covered annually -   Bone Density Screening    Bone density screening    Covered every 2 years after age 65 if diagnosed with risk of osteoporosis or estrogen deficiency.    Covered yearly for long-term glucocorticoid medication use (Steroids) Last Dexa  Scan:    XR DEXA BONE DENSITOMETRY (CPT=77080) 04/27/2022      No recommendations at this time   Pap and Pelvic    Pap   Covered every 2 years for women at normal risk; Annually if at high risk -  No recommendations at this time    Chlamydia Annually if high risk -  No recommendations at this time   Screening Mammogram    Mammogram     Recommend annually for all female patients aged 40 and older    One baseline mammogram covered for patients aged 35-39 06/12/2023    Health Maintenance   Topic Date Due   • Mammogram  Discontinued       Immunizations    Influenza Covered once per flu season  Please get every year 10/02/2023  No recommendations at this time    Pneumococcal Each vaccine ([id number] & [id number]) covered once after 65 Prevnar 13: 01/12/2016    Cdleaesoq46: 11/27/2013     No recommendations at this time    Hepatitis B One screening covered for patients with certain risk factors   -  No recommendations at this time    Tetanus Toxoid Not covered by Medicare Part B unless medically necessary (cut with metal); may be covered with your pharmacy prescription benefits -    Tetanus, Diptheria and Pertusis TD and TDaP Not covered by Medicare Part B -  No recommendations at this time    Zoster Not covered by Medicare Part B; may be covered with your pharmacy  prescription benefits -  Zoster Vaccines(1 of 2) Never done     Annual Monitoring of Persistent Medications (ACE/ARB, digoxin diuretics, anticonvulsants)    Potassium Annually Lab Results   Component Value Date    K 3.4 (L) 08/17/2023         Creatinine   Annually Lab Results   Component Value Date    CREATSERUM 0.92 08/17/2023         BUN Annually Lab Results   Component Value Date    BUN 13 08/17/2023       Drug Serum Conc Annually No results found for: \"DIGOXIN\", \"DIG\", \"VALP\"

## 2024-01-18 RX ORDER — LEVOTHYROXINE SODIUM 0.03 MG/1
TABLET ORAL
Qty: 90 TABLET | Refills: 0 | Status: SHIPPED | OUTPATIENT
Start: 2024-01-18

## 2024-03-29 ENCOUNTER — LAB ENCOUNTER (OUTPATIENT)
Dept: LAB | Facility: HOSPITAL | Age: 74
End: 2024-03-29
Attending: INTERNAL MEDICINE
Payer: MEDICARE

## 2024-03-29 DIAGNOSIS — Z00.00 ROUTINE GENERAL MEDICAL EXAMINATION AT A HEALTH CARE FACILITY: ICD-10-CM

## 2024-03-29 DIAGNOSIS — R00.2 PALPITATIONS: ICD-10-CM

## 2024-03-29 DIAGNOSIS — I10 BENIGN HYPERTENSION: Primary | ICD-10-CM

## 2024-03-29 DIAGNOSIS — E03.9 HYPOTHYROIDISM: ICD-10-CM

## 2024-03-29 DIAGNOSIS — E78.00 PURE HYPERCHOLESTEROLEMIA: ICD-10-CM

## 2024-03-29 LAB
ALBUMIN SERPL-MCNC: 3.2 G/DL (ref 3.4–5)
ALBUMIN/GLOB SERPL: 0.9 {RATIO} (ref 1–2)
ALP LIVER SERPL-CCNC: 69 U/L
ALT SERPL-CCNC: 18 U/L
ANION GAP SERPL CALC-SCNC: 6 MMOL/L (ref 0–18)
AST SERPL-CCNC: 23 U/L (ref 15–37)
BASOPHILS # BLD AUTO: 0.06 X10(3) UL (ref 0–0.2)
BASOPHILS NFR BLD AUTO: 1.2 %
BILIRUB SERPL-MCNC: 0.5 MG/DL (ref 0.1–2)
BUN BLD-MCNC: 15 MG/DL (ref 9–23)
CALCIUM BLD-MCNC: 9.2 MG/DL (ref 8.5–10.1)
CHLORIDE SERPL-SCNC: 107 MMOL/L (ref 98–112)
CHOLEST SERPL-MCNC: 157 MG/DL (ref ?–200)
CO2 SERPL-SCNC: 27 MMOL/L (ref 21–32)
CREAT BLD-MCNC: 0.9 MG/DL
EGFRCR SERPLBLD CKD-EPI 2021: 67 ML/MIN/1.73M2 (ref 60–?)
EOSINOPHIL # BLD AUTO: 0.14 X10(3) UL (ref 0–0.7)
EOSINOPHIL NFR BLD AUTO: 2.7 %
ERYTHROCYTE [DISTWIDTH] IN BLOOD BY AUTOMATED COUNT: 13.3 %
FASTING PATIENT LIPID ANSWER: YES
FASTING STATUS PATIENT QL REPORTED: YES
GLOBULIN PLAS-MCNC: 3.4 G/DL (ref 2.8–4.4)
GLUCOSE BLD-MCNC: 80 MG/DL (ref 70–99)
HCT VFR BLD AUTO: 37.5 %
HDLC SERPL-MCNC: 71 MG/DL (ref 40–59)
HGB BLD-MCNC: 12.7 G/DL
IMM GRANULOCYTES # BLD AUTO: 0.01 X10(3) UL (ref 0–1)
IMM GRANULOCYTES NFR BLD: 0.2 %
LDLC SERPL CALC-MCNC: 73 MG/DL (ref ?–100)
LYMPHOCYTES # BLD AUTO: 1.83 X10(3) UL (ref 1–4)
LYMPHOCYTES NFR BLD AUTO: 35.4 %
MCH RBC QN AUTO: 29.4 PG (ref 26–34)
MCHC RBC AUTO-ENTMCNC: 33.9 G/DL (ref 31–37)
MCV RBC AUTO: 86.8 FL
MONOCYTES # BLD AUTO: 0.4 X10(3) UL (ref 0.1–1)
MONOCYTES NFR BLD AUTO: 7.7 %
NEUTROPHILS # BLD AUTO: 2.73 X10 (3) UL (ref 1.5–7.7)
NEUTROPHILS # BLD AUTO: 2.73 X10(3) UL (ref 1.5–7.7)
NEUTROPHILS NFR BLD AUTO: 52.8 %
NONHDLC SERPL-MCNC: 86 MG/DL (ref ?–130)
OSMOLALITY SERPL CALC.SUM OF ELEC: 290 MOSM/KG (ref 275–295)
PLATELET # BLD AUTO: 295 10(3)UL (ref 150–450)
POTASSIUM SERPL-SCNC: 3.8 MMOL/L (ref 3.5–5.1)
PROT SERPL-MCNC: 6.6 G/DL (ref 6.4–8.2)
RBC # BLD AUTO: 4.32 X10(6)UL
SODIUM SERPL-SCNC: 140 MMOL/L (ref 136–145)
T4 FREE SERPL-MCNC: 1.1 NG/DL (ref 0.8–1.7)
TRIGL SERPL-MCNC: 66 MG/DL (ref 30–149)
TSI SER-ACNC: 3.31 MIU/ML (ref 0.36–3.74)
VLDLC SERPL CALC-MCNC: 10 MG/DL (ref 0–30)
WBC # BLD AUTO: 5.2 X10(3) UL (ref 4–11)

## 2024-03-29 PROCEDURE — 80053 COMPREHEN METABOLIC PANEL: CPT

## 2024-03-29 PROCEDURE — 85025 COMPLETE CBC W/AUTO DIFF WBC: CPT

## 2024-03-29 PROCEDURE — 84443 ASSAY THYROID STIM HORMONE: CPT

## 2024-03-29 PROCEDURE — 80061 LIPID PANEL: CPT

## 2024-03-29 PROCEDURE — 84439 ASSAY OF FREE THYROXINE: CPT

## 2024-03-29 PROCEDURE — 36415 COLL VENOUS BLD VENIPUNCTURE: CPT

## 2024-04-19 NOTE — TELEPHONE ENCOUNTER
Prescription request received via fax     Which pharmacy:  [id number] [place] - [address] AT [facility] [address], [phone], [phone]   [address] [place] [address]   Phone: [phone] Fax: [phone]       Prescription Refill Request - Patient advised can take 48-72 hours.      Name of Medication (strength, dose, qty requested:     90 day     Medication Quantity Refills Start End   levothyroxine 25 MCG Oral Tab 90 tablet 0 1/18/2024 --   Sig:   TAKE 1 TABLET(25 MCG) BY MOUTH DAILY     Route:   (none)

## 2024-04-20 RX ORDER — LEVOTHYROXINE SODIUM 0.03 MG/1
25 TABLET ORAL DAILY
Qty: 90 TABLET | Refills: 0 | Status: SHIPPED | OUTPATIENT
Start: 2024-04-20

## 2024-04-20 NOTE — TELEPHONE ENCOUNTER
Last VISIT 01-10-24 Jaya FREITAS PE    Last CPE 01-10-24    Last REFILL 01-18-24    Last LABS 03-29-24 tsh    No future appointments.      Per PROTOCOL? Yes

## 2024-04-22 ENCOUNTER — HOSPITAL ENCOUNTER (EMERGENCY)
Facility: HOSPITAL | Age: 74
Discharge: HOME | End: 2024-04-22
Attending: EMERGENCY MEDICINE
Payer: MEDICARE

## 2024-04-22 ENCOUNTER — APPOINTMENT (OUTPATIENT)
Dept: GENERAL RADIOLOGY | Facility: HOSPITAL | Age: 74
End: 2024-04-22
Payer: MEDICARE

## 2024-04-22 ENCOUNTER — APPOINTMENT (OUTPATIENT)
Dept: GENERAL RADIOLOGY | Facility: HOSPITAL | Age: 74
End: 2024-04-22
Attending: EMERGENCY MEDICINE
Payer: MEDICARE

## 2024-04-22 VITALS
HEART RATE: 69 BPM | HEIGHT: 59 IN | WEIGHT: 135 LBS | TEMPERATURE: 98 F | SYSTOLIC BLOOD PRESSURE: 165 MMHG | BODY MASS INDEX: 27.21 KG/M2 | OXYGEN SATURATION: 98 % | RESPIRATION RATE: 16 BRPM | DIASTOLIC BLOOD PRESSURE: 93 MMHG

## 2024-04-22 DIAGNOSIS — I15.9 SECONDARY HYPERTENSION: Primary | ICD-10-CM

## 2024-04-22 DIAGNOSIS — J38.3 VOCAL CORD STRAIN: ICD-10-CM

## 2024-04-22 LAB
ALBUMIN SERPL-MCNC: 3.8 G/DL (ref 3.4–5)
ALBUMIN/GLOB SERPL: 1 {RATIO} (ref 1–2)
ALP LIVER SERPL-CCNC: 85 U/L
ALT SERPL-CCNC: 20 U/L
ANION GAP SERPL CALC-SCNC: 6 MMOL/L (ref 0–18)
AST SERPL-CCNC: 20 U/L (ref 15–37)
ATRIAL RATE: 70 BPM
BASOPHILS # BLD AUTO: 0.06 X10(3) UL (ref 0–0.2)
BASOPHILS NFR BLD AUTO: 0.8 %
BILIRUB SERPL-MCNC: 0.4 MG/DL (ref 0.1–2)
BUN BLD-MCNC: 15 MG/DL (ref 9–23)
CALCIUM BLD-MCNC: 10 MG/DL (ref 8.5–10.1)
CHLORIDE SERPL-SCNC: 105 MMOL/L (ref 98–112)
CO2 SERPL-SCNC: 28 MMOL/L (ref 21–32)
CREAT BLD-MCNC: 1.1 MG/DL
EGFRCR SERPLBLD CKD-EPI 2021: 52 ML/MIN/1.73M2 (ref 60–?)
EOSINOPHIL # BLD AUTO: 0.01 X10(3) UL (ref 0–0.7)
EOSINOPHIL NFR BLD AUTO: 0.1 %
ERYTHROCYTE [DISTWIDTH] IN BLOOD BY AUTOMATED COUNT: 13.2 %
GLOBULIN PLAS-MCNC: 4 G/DL (ref 2.8–4.4)
GLUCOSE BLD-MCNC: 98 MG/DL (ref 70–99)
HCT VFR BLD AUTO: 41.3 %
HGB BLD-MCNC: 13.5 G/DL
IMM GRANULOCYTES # BLD AUTO: 0.02 X10(3) UL (ref 0–1)
IMM GRANULOCYTES NFR BLD: 0.3 %
LYMPHOCYTES # BLD AUTO: 1.53 X10(3) UL (ref 1–4)
LYMPHOCYTES NFR BLD AUTO: 20.1 %
MCH RBC QN AUTO: 29.1 PG (ref 26–34)
MCHC RBC AUTO-ENTMCNC: 32.7 G/DL (ref 31–37)
MCV RBC AUTO: 89 FL
MONOCYTES # BLD AUTO: 0.35 X10(3) UL (ref 0.1–1)
MONOCYTES NFR BLD AUTO: 4.6 %
NEUTROPHILS # BLD AUTO: 5.65 X10 (3) UL (ref 1.5–7.7)
NEUTROPHILS # BLD AUTO: 5.65 X10(3) UL (ref 1.5–7.7)
NEUTROPHILS NFR BLD AUTO: 74.1 %
OSMOLALITY SERPL CALC.SUM OF ELEC: 289 MOSM/KG (ref 275–295)
P AXIS: 54 DEGREES
P-R INTERVAL: 144 MS
PLATELET # BLD AUTO: 338 10(3)UL (ref 150–450)
POTASSIUM SERPL-SCNC: 4.3 MMOL/L (ref 3.5–5.1)
PROT SERPL-MCNC: 7.8 G/DL (ref 6.4–8.2)
Q-T INTERVAL: 410 MS
QRS DURATION: 76 MS
QTC CALCULATION (BEZET): 442 MS
R AXIS: 24 DEGREES
RBC # BLD AUTO: 4.64 X10(6)UL
SODIUM SERPL-SCNC: 139 MMOL/L (ref 136–145)
T AXIS: 50 DEGREES
TROPONIN I SERPL HS-MCNC: 5 NG/L
VENTRICULAR RATE: 70 BPM
WBC # BLD AUTO: 7.6 X10(3) UL (ref 4–11)

## 2024-04-22 PROCEDURE — 80053 COMPREHEN METABOLIC PANEL: CPT

## 2024-04-22 PROCEDURE — 85025 COMPLETE CBC W/AUTO DIFF WBC: CPT | Performed by: EMERGENCY MEDICINE

## 2024-04-22 PROCEDURE — 99284 EMERGENCY DEPT VISIT MOD MDM: CPT

## 2024-04-22 PROCEDURE — 93005 ELECTROCARDIOGRAM TRACING: CPT

## 2024-04-22 PROCEDURE — 85025 COMPLETE CBC W/AUTO DIFF WBC: CPT

## 2024-04-22 PROCEDURE — 36415 COLL VENOUS BLD VENIPUNCTURE: CPT

## 2024-04-22 PROCEDURE — 84484 ASSAY OF TROPONIN QUANT: CPT

## 2024-04-22 PROCEDURE — 71045 X-RAY EXAM CHEST 1 VIEW: CPT | Performed by: EMERGENCY MEDICINE

## 2024-04-22 PROCEDURE — 93010 ELECTROCARDIOGRAM REPORT: CPT

## 2024-04-22 PROCEDURE — 84484 ASSAY OF TROPONIN QUANT: CPT | Performed by: EMERGENCY MEDICINE

## 2024-04-22 PROCEDURE — 80053 COMPREHEN METABOLIC PANEL: CPT | Performed by: EMERGENCY MEDICINE

## 2024-04-22 PROCEDURE — 70360 X-RAY EXAM OF NECK: CPT | Performed by: EMERGENCY MEDICINE

## 2024-04-22 NOTE — ED QUICK NOTES
Pt states today was not feeling \"right\" while talking to sister and felt as though she could not get one of her words out. She then checked her BP and found it to be elevated. Pt also states she has had a few episodes of heart punding today as well.

## 2024-04-22 NOTE — ED INITIAL ASSESSMENT (HPI)
Pt states BP elevated, and rapid HR. Pt also notes pain to right arm yesterday.  Pt also noted decreased appetite.

## 2024-04-23 NOTE — ED PROVIDER NOTES
Patient Seen in: [facility] Emergency Department      History     Chief Complaint   Patient presents with    HTN     Stated Complaint: HTN 190s    Subjective:   HPI    Patient is here predominantly concerned for an episode that lasted several seconds.  She states she had a hard time making noise with her vocal cords but she was able to move and move her lips.  She clears her throat and this improved.  States he has a dry mouth.  Asymptomatic since.  Checked her blood pressure and it was very high so she came in for evaluation.  No chest pain or shortness of breath no headaches no nausea no vomiting.  Has been asymptomatic since.    Objective:   Past Medical History:    Abnormal EKG    Amenorrhea    Anesthesia complication    hypothermic    Breast CA (HCC)    Cancer (HCC)    left breast    Ductal carcinoma in situ of breast    Family history of coronary arteriosclerosis    Fibroids    H/O bone density study    osteopenia    H/O bone scan    osteopenia    H/O mammogram    negative    H/O mammogram    normal    Hyperthyroidism    Hypothyroidism    MVP (mitral valve prolapse)    blockage of carotid artery    AUDREY (obstructive sleep apnea)    AHI 7, REM AHI 45, CPAP 5    PAC (premature atrial contraction)    Palpitations    Unspecified essential hypertension              Past Surgical History:   Procedure Laterality Date    Appendectomy      Appendectomy  2002    Colectomy  2002    8-9 inches removed from intestine @ [facility]     Colonoscopy  2012    Colonoscopy  12/2017     6 mm polyp (removed, may not have been recovered --nl tissue and predominately fecal debris). repeat 5 yrs    Colonoscopy N/A 12/07/2017    Procedure: COLONOSCOPY, POSSIBLE BIOPSY, POSSIBLE POLYPECTOMY 08108;  Surgeon: Khurram Rivas MD;  Location: [facility], [facility]    Laparoscopy,pelvic,biopsy  1986    Lumpectomy left  12/2019    DCIS X 2 SITES    Jocelyn localization wire 1 site left (cpt=19281)  1980    Benign    Jocelyn  localization wire 1 site right (cpt=19281)      Benign          Other      dental implants    Other surgical history      Laser Surgery on uterus    Other surgical history      2 lumps removed from right breast/ 1 lump from left breast    Other surgical history Right 2007    Meniscus trimmed    Remv cartilage for graft nasal                  Social History     Socioeconomic History    Marital status:     Number of children: 2   Occupational History    Occupation: office work     Comment: retired    Tobacco Use    Smoking status: Former     Current packs/day: 0.00     Average packs/day: 0.5 packs/day for 2.0 years (1.0 ttl pk-yrs)     Types: Cigarettes     Start date: 1968     Quit date: 1970     Years since quittin.4    Smokeless tobacco: Never   Vaping Use    Vaping status: Never Used   Substance and Sexual Activity    Alcohol use: Yes    Drug use: Never    Sexual activity: Yes     Partners: Male   Other Topics Concern    Caffeine Concern Yes     Comment: 1 cup coffee in am    Exercise Yes     Comment: 1 hour about 3 to 4 times a week    Seat Belt Yes    Special Diet No    Stress Concern No    Weight Concern No     Social Determinants of Health     Physical Activity: Sufficiently Active (2020)    [facility] [employer] [employer]    Exercise Vital Sign     Days of Exercise per Week: 4 days     Minutes of Exercise per Session: 60 min              Review of Systems    Positive for stated complaint: HTN 190s  Other systems are as noted in HPI.  Constitutional and vital signs reviewed.      All other systems reviewed and negative except as noted above.    Physical Exam     ED Triage Vitals [24 1547]   BP (!) 191/93   Pulse 77   Resp 16   Temp 98.3 °F (36.8 °C)   Temp src Temporal   SpO2 100 %   O2 Device None (Room air)       Current:BP (!) 165/93   Pulse 69   Temp 98.3 °F (36.8 °C) (Temporal)   Resp 16   Ht 149.9 cm (4' 11\")    Wt 61.2 kg   LMP  (LMP Unknown)   SpO2 98%   BMI 27.27 kg/m²         Physical Exam    Physical Exam   Constitutional: Awake, alert, well appearing  Head: Normocephalic and atraumatic.   Eyes: Conjunctivae are normal. Pupils are equal, round, and reactive to light.   Neck: Normal range of motion. Neck supple.   Cardiovascular: Normal rate, regular rhythm  Pulmonary/Chest: Normal effort.  No accessory muscle use.  No clubbing, no cyanosis.  Abdominal: Soft. Bowel sounds are normal.   Neurological: Pt is alert and oriented to person, place, and time. no cranial nerve deficits  Speech fluent not slurred  Normal strength, strength and coordination all 4 extremities  Steady gait      Skin: Skin is warm and dry.    ED Course     Labs Reviewed   COMP METABOLIC PANEL (14) - Abnormal; Notable for the following components:       Result Value    Creatinine 1.10 (*)     eGFR-Cr 52 (*)     All other components within normal limits   TROPONIN I HIGH SENSITIVITY - Normal   CBC WITH DIFFERENTIAL WITH PLATELET    Narrative:     The following orders were created for panel order CBC With Differential With Platelet.  Procedure                               Abnormality         Status                     ---------                               -----------         ------                     CBC W/ DIFFERENTIAL[[id number]]                              Final result                 Please view results for these tests on the individual orders.   RAINBOW DRAW LAVENDER   RAINBOW DRAW LIGHT GREEN   RAINBOW DRAW BLUE   CBC W/ DIFFERENTIAL     EKG    Rate, intervals and axes as noted on EKG Report.  Rate: 70  Rhythm: Sinus rhythm no acute ischemiaSinus Rhythm  Reading: Sinus rhythm without acute ischemia                 Basic blood work is fine troponin negative    XR SOFT TISSUE NECK (CPT=70360)    Result Date: 4/22/2024  CONCLUSION:  Widely patent airway. No radiopaque foreign body.   LOCATION:  [place] [address]    Dictated by (CST): Edward Maurice  MD on 4/22/2024 at 7:01 PM     Finalized by (CST): Edward Maurice MD on 4/22/2024 at 7:02 PM       XR CHEST AP PORTABLE  (CPT=71045)    Result Date: 4/22/2024  PROCEDURE:  XR CHEST AP PORTABLE  (CPT=71045)  TECHNIQUE:  AP chest radiograph was obtained.  COMPARISON:  [facility], XR, XR CHEST PA + LAT CHEST (CPT=71046), 1/12/2022, 10:15 AM.  INDICATIONS:  HTN 190s  PATIENT STATED HISTORY: (As transcribed by Technologist)  Pt. with high blood pressure.    FINDINGS: Cardiac silhouette and pulmonary vasculature are unremarkable. No consolidation, pleural effusion or pneumothorax. IMPRESSION: Unremarkable portable chest radiograph.   LOCATION:  [place] [address]      Dictated by (CST): Edward Maurice MD on 4/22/2024 at 5:30 PM     Finalized by (CST): Edward Maurice MD on 4/22/2024 at 5:30 PM               MDM      Patient presents for an episode lasted several seconds where she had difficult time phonating.    She feels like something was stuck in her throat.  Differential diagnoses considered: Vocal cord dysfunction, vocal cord lesion, reflux, allergic symptoms.    -Presently asymptomatic x-ray of her chest and soft tissue neck are fine.  -Complains of dry mouth consider [facility] outpatient ENT follow-up  -      I visualized the radiology studies, my independent interpretation: No airway obstruction suggested by x-ray of neck.    *Discussion of ongoing management of this patient's care included: n/a  *Comorbidities contributing to the complexity of decision making: n/a  *External charts reviewed: n/a  *Additional sources of history: n/a    Shared decision making was done by: patient, myself.                                   Medical Decision Making      Disposition and Plan     Clinical Impression:  1. Secondary hypertension    2. Vocal cord strain         Disposition:  Discharge  4/22/2024  7:14 pm    Follow-up:  Nick Vega MD  [address] [facility] [address]  [phone]    Follow  up            Medications Prescribed:  Discharge Medication List as of 4/22/2024  7:17 PM

## 2024-04-25 ENCOUNTER — PATIENT OUTREACH (OUTPATIENT)
Dept: CASE MANAGEMENT | Age: 74
End: 2024-04-25

## 2024-04-25 NOTE — PROGRESS NOTES
1st attempt ER f/up apt request  PCP -existing apt (4/30)  [facility] -decline, pt wants to see PCP first  Closing encounter

## 2024-04-29 DIAGNOSIS — D05.12 DUCTAL CARCINOMA IN SITU (DCIS) OF LEFT BREAST: ICD-10-CM

## 2024-04-29 RX ORDER — ANASTROZOLE 1 MG/1
1 TABLET ORAL DAILY
Qty: 90 TABLET | Refills: 3 | Status: SHIPPED | OUTPATIENT
Start: 2024-04-29

## 2024-04-30 ENCOUNTER — OFFICE VISIT (OUTPATIENT)
Dept: INTERNAL MEDICINE CLINIC | Facility: CLINIC | Age: 74
End: 2024-04-30
Payer: MEDICARE

## 2024-04-30 VITALS
WEIGHT: 139.81 LBS | DIASTOLIC BLOOD PRESSURE: 70 MMHG | OXYGEN SATURATION: 100 % | SYSTOLIC BLOOD PRESSURE: 138 MMHG | TEMPERATURE: 97 F | BODY MASS INDEX: 28 KG/M2 | HEART RATE: 71 BPM

## 2024-04-30 DIAGNOSIS — Q23.1 BICUSPID AORTIC VALVE (HCC): ICD-10-CM

## 2024-04-30 DIAGNOSIS — I10 HYPERTENSION, BENIGN: Primary | ICD-10-CM

## 2024-04-30 DIAGNOSIS — J38.3 VOCAL CORD DYSFUNCTION: ICD-10-CM

## 2024-04-30 PROCEDURE — G2211 COMPLEX E/M VISIT ADD ON: HCPCS | Performed by: NURSE PRACTITIONER

## 2024-04-30 PROCEDURE — 99214 OFFICE O/P EST MOD 30 MIN: CPT | Performed by: NURSE PRACTITIONER

## 2024-04-30 RX ORDER — AMLODIPINE BESYLATE 5 MG/1
2.5 TABLET ORAL DAILY PRN
Qty: 30 TABLET | Refills: 0 | Status: SHIPPED | OUTPATIENT
Start: 2024-04-30

## 2024-04-30 NOTE — PROGRESS NOTES
Lisa Malone is a 73 year old female.    Chief Complaint   Patient presents with    ER F/U     High BP and vocal cords       HPI:   here for ER follow up   was seen in ER  for elevated bp and an episode described as not being able to make noise with her vocal cords then when she did it was high pitched.  She states she was talking to her sister when she noticed the word wasn't coming out  when it did, she notes it was very high pitched.  The episode happened again and she started to panic thinking she was having a stroke.  She then checked her bp and it was 200 systolic.  This prompted the ER visit.  Imaging reviewed and was referred to ENT for eval    Labs reviewed.      HTN was 190-200 systolic prompting ER visit.   hx bicuspid aortic valve follows with Dr Jeaneth Ahumada.  Olmesartan 20mg, hydrochlorothiazide 25mg,  with toprol 25mg  she has not taken the amlodipine in quite some time.   She would only use amlodipine 2.5mg as needed when she feels her bp would go really high but with above episode she noted it was  so did not take.    she has been checking her bp at home and consistently 120-130/80s  brought readings today.      Hx DCIS  Dr Rayo.  Anastrozole.      Patient Active Problem List   Diagnosis    Hypertension, benign    Hypothyroidism    White matter abnormality on MRI of brain    Osteopenia    Hyperglycemia    AUDREY on CPAP    Pure hypercholesterolemia    Premature atrial contractions    Bicuspid aortic valve (HCC)    History of breast cancer in female     Current Outpatient Medications   Medication Sig Dispense Refill    amLODIPine 5 MG Oral Tab Take 0.5 tablets (2.5 mg total) by mouth daily as needed. 30 tablet 0    anastrozole 1 MG Oral Tab tab Take 1 tablet (1 mg total) by mouth daily. 90 tablet 3    levothyroxine 25 MCG Oral Tab Take 1 tablet (25 mcg total) by mouth daily. 90 tablet 0    Olmesartan Medoxomil 20 MG Oral Tab Take 1 tablet (20 mg total) by mouth daily.      Metoprolol  Succinate ER 25 MG Oral Tablet 24 Hr Take 1 tablet (25 mg total) by mouth daily. 1/2 tablet  5    hydrochlorothiazide 25 MG Oral Tab Take 1 tablet (25 mg total) by mouth daily.      Cholecalciferol 1000 UNITS Oral Chew Tab Chew 2 tablets by mouth daily.      aspirin 81 MG Oral Tab Take 1 tablet (81 mg total) by mouth daily.  0    Multiple Vitamins-Minerals (MULTIVITAMIN OR) Take by mouth as needed.        acetaminophen 160 MG/5ML Oral Liquid Take 10.2 mL (325 mg total) by mouth every 4 (four) hours as needed for Fever. (Patient not taking: Reported on 2023) 236 mL 0      Past Medical History:    Abnormal EKG    Amenorrhea    Anesthesia complication    hypothermic    Breast CA (HCC)    Cancer (HCC)    left breast    Ductal carcinoma in situ of breast    Family history of coronary arteriosclerosis    Fibroids    H/O bone density study    osteopenia    H/O bone scan    osteopenia    H/O mammogram    negative    H/O mammogram    normal    Hyperthyroidism    Hypothyroidism    MVP (mitral valve prolapse)    blockage of carotid artery    AUDREY (obstructive sleep apnea)    AHI 7, REM AHI 45, CPAP 5    PAC (premature atrial contraction)    Palpitations    Unspecified essential hypertension      Social History:  Social History     Socioeconomic History    Marital status:     Number of children: 2   Occupational History    Occupation: office work     Comment: retired    Tobacco Use    Smoking status: Former     Current packs/day: 0.00     Average packs/day: 0.5 packs/day for 2.0 years (1.0 ttl pk-yrs)     Types: Cigarettes     Start date: 1968     Quit date: 1970     Years since quittin.4    Smokeless tobacco: Never   Vaping Use    Vaping status: Never Used   Substance and Sexual Activity    Alcohol use: Yes    Drug use: Never    Sexual activity: Yes     Partners: Male   Other Topics Concern    Caffeine Concern Yes     Comment: 1 cup coffee in am    Exercise Yes     Comment: 1 hour about 3 to 4  times a week    Seat Belt Yes    Special Diet No    Stress Concern No    Weight Concern No     Social Determinants of Health     Physical Activity: Sufficiently Active (2020)    Received from [employer], [employer]    Exercise Vital Sign     Days of Exercise per Week: 4 days     Minutes of Exercise per Session: 60 min     Family History   Problem Relation Age of Onset    Heart Attack Father     Hypertension Father             Heart Disorder Mother     Diabetes Mother             Hypertension Mother             Infectious Disease Maternal Grandmother     Diabetes Maternal Grandmother             Diabetes Brother     Stroke Brother     Cancer Brother     Breast Cancer Sister 64        just finished chemo, had lumpectomy    Breast Cancer Self 69        DCIS LEFT  BREAST X2 SITES    DCIS Self         Allergies  Allergies   Allergen Reactions    Epinephrine PALPITATIONS       REVIEW OF SYSTEMS:   GENERAL HEALTH: feels better  as above   RESPIRATORY: denies shortness of breath with exertion, no cough  CARDIOVASCULAR: denies chest pain on exertion, no palpatations  GI: denies abdominal pain and denies heartburn, no diarrhea or constipation  MUSCULOSKELETAL:  No arthralgias or myalgias  NEURO: denies headaches,     EXAM:   /70   Pulse 71   Temp 96.7 °F (35.9 °C) (Temporal)   Wt 139 lb 12.8 oz (63.4 kg)   LMP  (LMP Unknown)   SpO2 100%   BMI 28.24 kg/m²   GENERAL: well developed, well nourished,in no apparent distress  HEENT: atraumatic, normocephalic,ears and throat are clear  NECK: supple,no adenopathy,  LUNGS: normal rate without respiratory distress, lungs clear to auscultation  no wheezing.   CARDIO: RRR   EXTREMITIES: no edema, normal strength and tone  PSYCH: alert and oriented x 3    ASSESSMENT AND PLAN:     Encounter Diagnoses   Name Primary?    Hypertension, benign  her bp is stable on benicar hct toprol  will continue to use amlodipine PRN   refill sent.  She was able to explain to me exactly how she has used this in the past.  Yes    Bicuspid aortic valve (HCC)  Dr Jeaneth Ahumada     Vocal cord dysfunction  will refer to ENT for eval       Code selection for this visit was based on time spent (>30min) on date of service in preparing to see the patient, obtaining and/or reviewing separately obtained history, performing a medically appropriate examination, counseling and educating the patient/family/caregiver, ordering medications or testing, referring and communicating with other healthcare providers, documenting clinical information in the EHR, independently interpreting results and communicating results to the patient/family/caregiver and care coordination with the patient's other providers.  No orders of the defined types were placed in this encounter.      Meds & Refills for this Visit:  Requested Prescriptions     Signed Prescriptions Disp Refills    amLODIPine 5 MG Oral Tab 30 tablet 0     Sig: Take 0.5 tablets (2.5 mg total) by mouth daily as needed.       Imaging & Consults:  None    No follow-ups on file.  There are no Patient Instructions on file for this visit.

## 2024-06-06 ENCOUNTER — OFFICE VISIT (OUTPATIENT)
Facility: LOCATION | Age: 74
End: 2024-06-06
Payer: MEDICARE

## 2024-06-06 DIAGNOSIS — R49.0 HOARSENESS: Primary | ICD-10-CM

## 2024-06-06 DIAGNOSIS — R09.82 POST-NASAL DRAINAGE: ICD-10-CM

## 2024-06-06 PROCEDURE — 31575 DIAGNOSTIC LARYNGOSCOPY: CPT | Performed by: OTOLARYNGOLOGY

## 2024-06-06 PROCEDURE — 99203 OFFICE O/P NEW LOW 30 MIN: CPT | Performed by: OTOLARYNGOLOGY

## 2024-06-06 RX ORDER — IPRATROPIUM BROMIDE 42 UG/1
1 SPRAY, METERED NASAL 2 TIMES DAILY
Qty: 1 EACH | Refills: 5 | Status: SHIPPED | OUTPATIENT
Start: 2024-06-06

## 2024-06-06 NOTE — PROGRESS NOTES
Lisa Malone is a 73 year old female. No chief complaint on file.    HPI:   She found it hard to speak.  Then she got anxiety and her blood pressure went up.  She went to the emergency room and evaluation was performed.  She has noticed some associated chronic hoarseness.  She tends to get more postnasal drip than she would like and a runny nose.  Current Outpatient Medications   Medication Sig Dispense Refill    ipratropium 0.06 % Nasal Solution 1 spray by Nasal route 2 (two) times daily. 1 each 5    amLODIPine 5 MG Oral Tab Take 0.5 tablets (2.5 mg total) by mouth daily as needed. 30 tablet 0    anastrozole 1 MG Oral Tab tab Take 1 tablet (1 mg total) by mouth daily. 90 tablet 3    levothyroxine 25 MCG Oral Tab Take 1 tablet (25 mcg total) by mouth daily. 90 tablet 0    acetaminophen 160 MG/5ML Oral Liquid Take 10.2 mL (325 mg total) by mouth every 4 (four) hours as needed for Fever. (Patient not taking: Reported on 11/17/2023) 236 mL 0    Olmesartan Medoxomil 20 MG Oral Tab Take 1 tablet (20 mg total) by mouth daily.      Metoprolol Succinate ER 25 MG Oral Tablet 24 Hr Take 1 tablet (25 mg total) by mouth daily. 1/2 tablet  5    hydrochlorothiazide 25 MG Oral Tab Take 1 tablet (25 mg total) by mouth daily.      Cholecalciferol 1000 UNITS Oral Chew Tab Chew 2 tablets by mouth daily.      aspirin 81 MG Oral Tab Take 1 tablet (81 mg total) by mouth daily.  0    Multiple Vitamins-Minerals (MULTIVITAMIN OR) Take by mouth as needed.          Past Medical History:    Abnormal EKG    Amenorrhea    Anesthesia complication    hypothermic    Breast CA (HCC)    Cancer (HCC)    left breast    Ductal carcinoma in situ of breast    Family history of coronary arteriosclerosis    Fibroids    H/O bone density study    osteopenia    H/O bone scan    osteopenia    H/O mammogram    negative    H/O mammogram    normal    Hyperthyroidism    Hypothyroidism    MVP (mitral valve prolapse)    blockage of carotid artery    AUDREY  (obstructive sleep apnea)    AHI 7, REM AHI 45, CPAP 5    PAC (premature atrial contraction)    Palpitations    Unspecified essential hypertension      Social History:  Social History     Socioeconomic History    Marital status:     Number of children: 2   Occupational History    Occupation: office work     Comment: retired    Tobacco Use    Smoking status: Former     Current packs/day: 0.00     Average packs/day: 0.5 packs/day for 2.0 years (1.0 ttl pk-yrs)     Types: Cigarettes     Start date: 1968     Quit date: 1970     Years since quittin.5    Smokeless tobacco: Never   Vaping Use    Vaping status: Never Used   Substance and Sexual Activity    Alcohol use: Yes    Drug use: Never    Sexual activity: Yes     Partners: Male   Other Topics Concern    Caffeine Concern Yes     Comment: 1 cup coffee in am    Exercise Yes     Comment: 1 hour about 3 to 4 times a week    Seat Belt Yes    Special Diet No    Stress Concern No    Weight Concern No     Social Determinants of Health     Physical Activity: Sufficiently Active (2020)    Received from [employer]    Exercise Vital Sign     Days of Exercise per Week: 4 days     Minutes of Exercise per Session: 60 min      Past Surgical History:   Procedure Laterality Date    Appendectomy      Appendectomy  2002    Colectomy  2002    8-9 inches removed from intestine @ [facility]     Colonoscopy      Colonoscopy  2017     6 mm polyp (removed, may not have been recovered --nl tissue and predominately fecal debris). repeat 5 yrs    Colonoscopy N/A 2017    Procedure: COLONOSCOPY, POSSIBLE BIOPSY, POSSIBLE POLYPECTOMY 57057;  Surgeon: Khurram Rivas MD;  [facility]    Laparoscopy,pelvic,biopsy  1986    Lumpectomy left  2019    DCIS X 2 SITES    Jocelyn localization wire 1 site left (cpt=19281)      Benign    Jocelyn localization wire 1 site right (cpt=19281)       Benign          Other      dental implants    Other surgical history      Laser Surgery on uterus    Other surgical history      2 lumps removed from right breast/ 1 lump from left breast    Other surgical history Right 2007    Meniscus trimmed    Remv cartilage for graft nasal  1981         REVIEW OF SYSTEMS:   GENERAL HEALTH: feels well otherwise  GENERAL : denies fever, chills, sweats, weight loss, weight gain  SKIN: denies any unusual skin lesions or rashes  RESPIRATORY: denies shortness of breath with exertion  NEURO: denies headaches    EXAM:   LMP  (LMP Unknown)     System Findings Details   Constitutional  Overall appearance - Normal.   Psychiatric  Orientation - Oriented to time, place, person & situation. Appropriate mood and affect.   Head/Face  Facial features -- Normal. Skull - Normal.   Eyes  Pupils equal ,round ,react to light and accomidate   Ears, Nose, Throat, Neck  Ears clear nose mild congestion oropharynx clear neck no masses   Neurological  Memory - Normal. Cranial nerves - Cranial nerves II through XII grossly intact.   Lymph Detail  Submental. Submandibular. Anterior cervical. Posterior cervical. Supraclavicular.     Flexible Laryngoscopy Procedure Note (30232)    Due to inability for adequate examination of the larynx and need for magnification to perform the examination, endoscopy was performed.  Risks and benefits were discussed with patient/family and they have given verbal consent to proceed.    Pre-operative Diagnosis:   1. Hoarseness    2. Post-nasal drainage        Post-operative Diagnosis: Same    Procedure: Diagnostic flexible fiberoptic laryngoscopy    Anesthesia: topical lidocaine    Surgeon: Erwin Rhoades MD    EBL: 0cc    Procedure Detail & Findings: Base of tongue epiglottis and vocal cords are normal with normal mobility.    Condition: Stable    Complications: Patient tolerated the procedure well with no immediate complications.      Erwin Rhoades  MD    ASSESSMENT AND PLAN:   1. Hoarseness  Likely due to some mild irritation of the vocal cords.  She does get postnasal drip.  Otherwise the vocal cords look good on endoscopy.    2. Post-nasal drainage  She will try nasal saline and Atrovent nasal spray.      The patient indicates understanding of these issues and agrees to the plan.    No follow-ups on file.    Erwin Rhoades MD  6/6/2024  5:51 PM

## 2024-06-14 ENCOUNTER — HOSPITAL ENCOUNTER (OUTPATIENT)
Dept: MAMMOGRAPHY | Facility: HOSPITAL | Age: 74
Discharge: HOME | End: 2024-06-14
Attending: INTERNAL MEDICINE

## 2024-06-14 ENCOUNTER — HOSPITAL ENCOUNTER (OUTPATIENT)
Dept: BONE DENSITY | Age: 74
Discharge: HOME | End: 2024-06-14
Attending: INTERNAL MEDICINE

## 2024-06-14 DIAGNOSIS — D05.12 DUCTAL CARCINOMA IN SITU (DCIS) OF LEFT BREAST: ICD-10-CM

## 2024-06-14 DIAGNOSIS — Z78.0 POSTMENOPAUSAL: ICD-10-CM

## 2024-06-14 DIAGNOSIS — R92.8 ABNORMAL MAMMOGRAM OF RIGHT BREAST: Primary | ICD-10-CM

## 2024-06-14 PROCEDURE — 77066 DX MAMMO INCL CAD BI: CPT | Performed by: INTERNAL MEDICINE

## 2024-06-14 PROCEDURE — 77080 DXA BONE DENSITY AXIAL: CPT | Performed by: INTERNAL MEDICINE

## 2024-06-14 PROCEDURE — 77062 BREAST TOMOSYNTHESIS BI: CPT | Performed by: INTERNAL MEDICINE

## 2024-06-14 NOTE — IMAGING NOTE
Lisa Pleitezrick is recommended for a stereotactic biopsy of the right breast by Dr.Stromberg.    History  left breast- Ductal carcinoma in situ (DCIS) breast   Findings- indeterminate calcification within the upper outer quadrant of the right breast,   Recommendation- STEREOTACTIC BREAST BIOPSY: RIGHT BREAST     See EMR for complete imaging report    Medications and allergies reviewed:  Current Outpatient Medications   Medication Sig Dispense Refill    ipratropium 0.06 % Nasal Solution 1 spray by Nasal route 2 (two) times daily. 1 each 5    amLODIPine 5 MG Oral Tab Take 0.5 tablets (2.5 mg total) by mouth daily as needed. 30 tablet 0    anastrozole 1 MG Oral Tab tab Take 1 tablet (1 mg total) by mouth daily. 90 tablet 3    levothyroxine 25 MCG Oral Tab Take 1 tablet (25 mcg total) by mouth daily. 90 tablet 0    acetaminophen 160 MG/5ML Oral Liquid Take 10.2 mL (325 mg total) by mouth every 4 (four) hours as needed for Fever. (Patient not taking: Reported on 11/17/2023) 236 mL 0    Olmesartan Medoxomil 20 MG Oral Tab Take 1 tablet (20 mg total) by mouth daily.      Metoprolol Succinate ER 25 MG Oral Tablet 24 Hr Take 1 tablet (25 mg total) by mouth daily. 1/2 tablet  5    hydrochlorothiazide 25 MG Oral Tab Take 1 tablet (25 mg total) by mouth daily.      Cholecalciferol 1000 UNITS Oral Chew Tab Chew 2 tablets by mouth daily.      aspirin 81 MG Oral Tab Take 1 tablet (81 mg total) by mouth daily.  0    Multiple Vitamins-Minerals (MULTIVITAMIN OR) Take by mouth as needed.       The following allergy was reported-  EpinephrinePALPITATIONS    Lisa Pleitezrick  denies the use of prescribed anticoagulants, denies known bleeding disorders and/or liver disease, denies chemotherapy    Procedure explained and questions answered.   Lisa Malone provided with written educational material.     Ms. Malone instructed to take 1000 mg of acetaminophen on the day of the biopsy, eat a light meal, and bring or wear a sport bra.  Post biopsy  care and instruction reviewed: including no lifting more than five pounds, no upper body exercise, icing of biopsy site, no submerging in water.  Lisa Malone verbalized understanding.    Ms. Malone informed that the [facility] schedulers would be contacting her once the biopsy order is received to schedule an appointment.

## 2024-06-17 ENCOUNTER — TELEPHONE (OUTPATIENT)
Dept: HEMATOLOGY/ONCOLOGY | Facility: HOSPITAL | Age: 74
End: 2024-06-17

## 2024-06-17 NOTE — TELEPHONE ENCOUNTER
Returned call.   Pt has the biopsy scheduled, but waiting for a call back to reschedule due to family plans on Saturday.   Emotional support offered.   Will contact once results are available.   Pt verbalized understanding.

## 2024-06-17 NOTE — TELEPHONE ENCOUNTER
Pt is calling to inform Dr Rayo she was advised to make a biopsy appt. Please call pt back after 2:00pm to discuss-NL

## 2024-06-24 ENCOUNTER — HOSPITAL ENCOUNTER (OUTPATIENT)
Dept: MAMMOGRAPHY | Facility: HOSPITAL | Age: 74
Discharge: HOME | End: 2024-06-24
Attending: INTERNAL MEDICINE

## 2024-06-24 DIAGNOSIS — R92.8 ABNORMAL MAMMOGRAM OF RIGHT BREAST: ICD-10-CM

## 2024-06-24 PROCEDURE — 19081 BX BREAST 1ST LESION STRTCTC: CPT | Performed by: INTERNAL MEDICINE

## 2024-06-24 PROCEDURE — 88305 TISSUE EXAM BY PATHOLOGIST: CPT | Performed by: INTERNAL MEDICINE

## 2024-07-17 RX ORDER — LEVOTHYROXINE SODIUM 0.03 MG/1
25 TABLET ORAL DAILY
Qty: 90 TABLET | Refills: 3 | Status: SHIPPED | OUTPATIENT
Start: 2024-07-17

## 2024-07-17 NOTE — TELEPHONE ENCOUNTER
Refill Per Protocol     Requested Prescriptions   Pending Prescriptions Disp Refills    LEVOTHYROXINE 25 MCG Oral Tab [Pharmacy Med Name: LEVOTHYROXINE 0.025MG (25MCG) TAB] 90 tablet 0     Sig: TAKE 1 TABLET(25 MCG) BY MOUTH DAILY       Thyroid Medication Protocol Passed - 7/13/2024  5:13 AM        Passed - TSH in past 12 months        Passed - Last TSH value is normal     Lab Results   Component Value Date    TSH 3.310 03/29/2024                 Passed - In person appointment or virtual visit in the past 12 mos or appointment in next 3 mos     Recent Outpatient Visits              1 month ago Hoarseness    [facility], [address] [facility], Erwin Mckeon MD    Office Visit    2 months ago Hypertension, benign    [facility], [address] [place], [place] [address]Bryanna Moss APRN    Office Visit    6 months ago Bicuspid aortic valve (HCC)    [facility], [address] [place] [address] [place] Schriedel, Adam, MD    Office Visit    [facility]    [facility]    9 months ago Right ear pain    [facility], [address] [place], [place] [address]Bryanna Espinoza APRN    Office Visit                               Recent Outpatient Visits              1 month ago Hoarseness    [facility], [address] [place], Erwin Mckeon MD    Office Visit    2 months ago Hypertension, benign    [facility], [address] [place] [address], [place]ryanna Moss APRN    Office Visit    6 months ago Bicuspid aortic valve (HCC)    [facility], [address] [place] [address] [place] Schriedel, Adam, MD    Office Visit    8 months ago [facility], MD Yi    Office Visit    9 months ago Right ear pain    [facility], [address] [place], [place] [address]Bryanna Espinoza APRN    Office Visit

## 2024-08-24 ENCOUNTER — APPOINTMENT (OUTPATIENT)
Dept: GENERAL RADIOLOGY | Age: 74
End: 2024-08-24
Attending: PHYSICIAN ASSISTANT
Payer: MEDICARE

## 2024-08-24 ENCOUNTER — HOSPITAL ENCOUNTER (OUTPATIENT)
Age: 74
Discharge: HOME | End: 2024-08-24
Payer: MEDICARE

## 2024-08-24 VITALS
OXYGEN SATURATION: 97 % | SYSTOLIC BLOOD PRESSURE: 131 MMHG | WEIGHT: 135 LBS | TEMPERATURE: 98 F | RESPIRATION RATE: 18 BRPM | DIASTOLIC BLOOD PRESSURE: 82 MMHG | BODY MASS INDEX: 26.5 KG/M2 | HEART RATE: 65 BPM | HEIGHT: 60 IN

## 2024-08-24 DIAGNOSIS — U07.1 POSITIVE SELF-ADMINISTERED ANTIGEN TEST FOR COVID-19: Primary | ICD-10-CM

## 2024-08-24 PROCEDURE — 71046 X-RAY EXAM CHEST 2 VIEWS: CPT | Performed by: PHYSICIAN ASSISTANT

## 2024-08-24 PROCEDURE — 99213 OFFICE O/P EST LOW 20 MIN: CPT | Performed by: PHYSICIAN ASSISTANT

## 2024-08-24 RX ORDER — BENZONATATE 100 MG/1
100 CAPSULE ORAL 3 TIMES DAILY PRN
Qty: 15 CAPSULE | Refills: 0 | Status: SHIPPED | OUTPATIENT
Start: 2024-08-24

## 2024-08-24 NOTE — ED PROVIDER NOTES
Patient Seen in: [facility]      History     Chief Complaint   Patient presents with    Cough/URI     Stated Complaint: covid +    Subjective:   HPI    72 YO female with below stated PMHx presents to immediate care for evaluation of cough and throat irritation secondary to cough starting yesterday. Positive home COVID test today. Patient states she was informed she could not take Paxlovid because all of the medications she is on. She prefers to contact PCP on Monday to discuss antiviral medication options. She will still be within timeframe for antiviral initiation.        Objective:   Past Medical History:    Abnormal EKG    Amenorrhea    Anesthesia complication    hypothermic    Breast CA (HCC)    Cancer (HCC)    left breast    Ductal carcinoma in situ of breast    Family history of coronary arteriosclerosis    Fibroids    H/O bone density study    osteopenia    H/O bone scan    osteopenia    H/O mammogram    negative    H/O mammogram    normal    Hyperthyroidism    Hypothyroidism    MVP (mitral valve prolapse)    blockage of carotid artery    AUDREY (obstructive sleep apnea)    AHI 7, REM AHI 45, CPAP 5    PAC (premature atrial contraction)    Palpitations    Unspecified essential hypertension              Past Surgical History:   Procedure Laterality Date    Appendectomy      Appendectomy  2002    Colectomy  2002    8-9 inches removed from intestine @ [facility]     Colonoscopy  2012    Colonoscopy  12/2017     6 mm polyp (removed, may not have been recovered --nl tissue and predominately fecal debris). repeat 5 yrs    Colonoscopy N/A 12/07/2017    Procedure: COLONOSCOPY, POSSIBLE BIOPSY, POSSIBLE POLYPECTOMY 39129;  Surgeon: Khurram Rivas MD;  [facility]    Laparoscopy,pelvic,biopsy  1986    Lumpectomy left  12/2019    DCIS X 2 SITES    Ojcelyn localization wire 1 site left (cpt=19281)  1980    Benign    Jocelyn localization wire 1 site right (cpt=19281)  1980     Benign          Other      dental implants    Other surgical history      Laser Surgery on uterus    Other surgical history      2 lumps removed from right breast/ 1 lump from left breast    Other surgical history Right 2007    Meniscus trimmed    Remv cartilage for graft nasal  1981                Social History     Socioeconomic History    Marital status:     Number of children: 2   Occupational History    Occupation: office work     Comment: retired    Tobacco Use    Smoking status: Former     Current packs/day: 0.00     Average packs/day: 0.5 packs/day for 2.0 years (1.0 ttl pk-yrs)     Types: Cigarettes     Start date: 1968     Quit date: 1970     Years since quittin.7    Smokeless tobacco: Never   Vaping Use    Vaping status: Never Used   Substance and Sexual Activity    Alcohol use: Yes    Drug use: Never    Sexual activity: Yes     Partners: Male   Other Topics Concern    Caffeine Concern Yes     Comment: 1 cup coffee in am    Exercise Yes     Comment: 1 hour about 3 to 4 times a week    Seat Belt Yes    Special Diet No    Stress Concern No    Weight Concern No     Social Determinants of Health     Physical Activity: Sufficiently Active (2020)    Received from [facility]    Exercise Vital Sign     Days of Exercise per Week: 4 days     Minutes of Exercise per Session: 60 min              Review of Systems    Positive for stated Chief Complaint: Cough/URI    Other systems are as noted in HPI.  Constitutional and vital signs reviewed.      All other systems reviewed and negative except as noted above.    Physical Exam     ED Triage Vitals [24 1358]   /82   Pulse 65   Resp 18   Temp 97.8 °F (36.6 °C)   Temp src Temporal   SpO2 97 %   O2 Device None (Room air)       Current Vitals:   Vital Signs  BP: 131/82  Pulse: 65  Resp: 18  Temp: 97.8 °F (36.6 °C)  Temp src: Temporal    Oxygen Therapy  SpO2: 97 %  O2 Device: None  (Room air)            Physical Exam  Vitals and nursing note reviewed.   Constitutional:       General: She is not in acute distress.     Appearance: Normal appearance. She is not ill-appearing, toxic-appearing or diaphoretic.   HENT:      Mouth/Throat:      Mouth: Mucous membranes are moist.      Pharynx: Oropharynx is clear.   Cardiovascular:      Rate and Rhythm: Normal rate.   Pulmonary:      Effort: Pulmonary effort is normal. No respiratory distress.      Breath sounds: Normal breath sounds. No wheezing.   Neurological:      Mental Status: She is alert and oriented to person, place, and time.   Psychiatric:         Mood and Affect: Mood normal.         Behavior: Behavior normal.         ED Course   Labs Reviewed - No data to display  XR CHEST PA + LAT CHEST (CPT=71046)    Result Date: 8/24/2024  PROCEDURE:  XR CHEST PA + LAT CHEST (CPT=71046)  INDICATIONS:  r/o pneumonia, COVID+  COMPARISON:  [facility] , XR, XR CHEST AP PORTABLE  (CPT=71045), 4/22/2024, 4:59 PM.  TECHNIQUE:  PA and lateral chest radiographs were obtained.  PATIENT STATED HISTORY: (As transcribed by Technologist)  Non productive cough for 2 days. Positive home Covid test.    FINDINGS:  LUNGS:  No focal consolidation.  Normal vascularity. CARDIAC:  Normal size cardiac silhouette.  Mildly tortuous and ectatic aorta. MEDIASTINUM:  Normal. PLEURA:  Normal.  No pleural effusions. BONES:  Normal for age.            CONCLUSION:  No acute cardiopulmonary process.   LOCATION:  [facility]   Dictated by (CST): Smith Doty MD on 8/24/2024 at 3:20 PM     Finalized by (CST): Smith Doty MD on 8/24/2024 at 3:20 PM        I independently reviewed x-rays.  No acute cardiopulmonary process.    MDM      Differential diagnosis considered but not limited to COVID, pneumonia, other    Patient is afebrile with reassuring physical exam.  Chest x-ray is negative for acute findings.  Tessalon Perles prescribed for cough.  Supportive care and return instructions discussed.   Patient prefers to call PCP on Monday to discuss antiviral options.  She will still be within appropriate timeframe for antiviral initiation.        Medical Decision Making  Amount and/or Complexity of Data Reviewed  Radiology: ordered and independent interpretation performed. Decision-making details documented in ED Course.    Risk  Prescription drug management.        Disposition and Plan     Clinical Impression:  1. Positive self-administered antigen test for COVID-19         Disposition:  Discharge  8/24/2024  3:24 pm    Follow-up:  Schriedel, Adam, MD  [address] [place] [address]  [phone]    Schedule an appointment as soon as possible for a visit             Medications Prescribed:  Current Discharge Medication List        START taking these medications    Details   benzonatate (TESSALON PERLES) 100 MG Oral Cap Take 1 capsule (100 mg total) by mouth 3 (three) times daily as needed for cough.  Qty: 15 capsule, Refills: 0

## 2024-08-26 ENCOUNTER — TELEMEDICINE (OUTPATIENT)
Dept: INTERNAL MEDICINE CLINIC | Facility: CLINIC | Age: 74
End: 2024-08-26
Payer: MEDICARE

## 2024-08-26 DIAGNOSIS — U07.1 COVID: Primary | ICD-10-CM

## 2024-08-26 NOTE — PROGRESS NOTES
Lisa Malone is a 73 year old female.   HPI:    Pt tested positive for covid on Saturday morning, 8/24.   Symptoms began Friday evening.   Went to  on 8/24 and was given tessalon with some relief.   C/o sore throat, dry cough, low grade fever (Tmax 100.4). Feels like cough is a bit looser today.    also has covid.   Denies SOB.     Allergies:  Allergies   Allergen Reactions    Epinephrine PALPITATIONS      Current Meds:  Current Outpatient Medications   Medication Sig Dispense Refill    benzonatate (TESSALON PERLES) 100 MG Oral Cap Take 1 capsule (100 mg total) by mouth 3 (three) times daily as needed for cough. 15 capsule 0    levothyroxine 25 MCG Oral Tab Take 1 tablet (25 mcg total) by mouth daily. 90 tablet 3    ipratropium 0.06 % Nasal Solution 1 spray by Nasal route 2 (two) times daily. 1 each 5    amLODIPine 5 MG Oral Tab Take 0.5 tablets (2.5 mg total) by mouth daily as needed. 30 tablet 0    anastrozole 1 MG Oral Tab tab Take 1 tablet (1 mg total) by mouth daily. 90 tablet 3    acetaminophen 160 MG/5ML Oral Liquid Take 10.2 mL (325 mg total) by mouth every 4 (four) hours as needed for Fever. (Patient not taking: Reported on 11/17/2023) 236 mL 0    Olmesartan Medoxomil 20 MG Oral Tab Take 1 tablet (20 mg total) by mouth daily.      Metoprolol Succinate ER 25 MG Oral Tablet 24 Hr Take 1 tablet (25 mg total) by mouth daily. 1/2 tablet  5    hydrochlorothiazide 25 MG Oral Tab Take 1 tablet (25 mg total) by mouth daily.      Cholecalciferol 1000 UNITS Oral Chew Tab Chew 2 tablets by mouth daily.      aspirin 81 MG Oral Tab Take 1 tablet (81 mg total) by mouth daily.  0    Multiple Vitamins-Minerals (MULTIVITAMIN OR) Take by mouth as needed.            PMH:     Past Medical History:    Abnormal EKG    Amenorrhea    Anesthesia complication    hypothermic    Breast CA (HCC)    Cancer (HCC)    left breast    Ductal carcinoma in situ of breast    Family history of coronary arteriosclerosis    Fibroids     H/O bone density study    osteopenia    H/O bone scan    osteopenia    H/O mammogram    negative    H/O mammogram    normal    Hyperthyroidism    Hypothyroidism    MVP (mitral valve prolapse)    blockage of carotid artery    AUDREY (obstructive sleep apnea)    AHI 7, REM AHI 45, CPAP 5    PAC (premature atrial contraction)    Palpitations    Unspecified essential hypertension         ROS:   Review of Systems   Constitutional:  Positive for fatigue and fever. Negative for chills.   HENT:  Positive for congestion, postnasal drip, rhinorrhea, sinus pressure and sore throat.    Respiratory:  Positive for cough. Negative for shortness of breath and wheezing.    Cardiovascular:  Negative for chest pain.   Neurological:  Negative for dizziness, light-headedness and headaches.           PHYSICAL EXAM:   No vital signs or physical exam completed for this visit as visit was done via telehealth.       ASSESSMENT/ PLAN:   1. COVID  Reviewed isolation guidelines per most recent CDC recommendations   Reviewed supportive care  Discussed paxlovid but pt declined at this time  UC/ER precautions given if any worse        Health Maintenance Due   Topic Date Due    Zoster Vaccines (1 of 2) Never done    Colorectal Cancer Screening  12/07/2022    COVID-19 Vaccine (7 - 2023-24 season) 02/02/2024         Pt indicates understanding and agrees to the plan.     No follow-ups on file.    Daria Elizalde PA-C    TIME: 15 minutes    Lisa Malone understands phone evaluation is not a substitute for face-to-face examination or emergency care. Patient advised to go to ER or call 911 for worsening symptoms or acute distress.     Please note that the following visit was completed using two-way, real-time interactive audio communication.  This has been done in good betsey to provide continuity of care in the best interest of the provider-patient relationship, due to the on-going public health crisis/national emergency and because of  restrictions of visitation.  There are limitations of this visit as no physical exam could be performed.  Every conscious effort was taken to allow for sufficient and adequate time.  This billing visit was spent on reviewing labs, medications, radiology tests and decision making.  Appropriate medical decision-making and tests are ordered as detailed in the plan of care above.

## 2024-12-18 NOTE — PROGRESS NOTES
Cancer Center Progress Note      Patient Name:  Lisa Malone  YOB: 1950  Medical Record Number:  [id number]    Date of visit: 12/20/2024    CHIEF COMPLAINT: L breast DCIS s/p lumpectomy.    HPI:     74 year old postmenopausal female that I have followed since 1/20 after she underwent work-up of abnormal  mammogram.  Bx-9 mm and 4 mm of DCIS, ER/MI+.  Lumpectomy 12/19-2 mm residual DCIS.  Declined RT.  Started anastrozole 1/20.      No new complaint.  Few hot flashes.    SOCIAL HISTORY:    , 2 children, 4 grandchildren.    ROS:     Constitutional: fatigue  Neurologic: no headache, seizures, diplopia or weakness  Respiratory: no cough, SOB or hemoptysis  Cardiovascular: no chest pain, ankle swelling, FLORES  GI: no nausea, vomiting, diarrhea or BRBPR  Musculoskeletal: no body-ache or joint pain  Dermatologic: no alopecia, rash, pruritis  : no hematuria, dysuria or frequency  Psych: no confusion or depression   Heme: no easy bruising or bleeding     Breast exam: Both breasts are soft, dense, mildly lumpy texture but no discretely palpable masses.  No axillary adenopathy.    ALLERGIES:    Allergies   Allergen Reactions    Epinephrine PALPITATIONS       MEDICATIONS:    Current Outpatient Medications   Medication Sig Dispense Refill    benzonatate (TESSALON PERLES) 100 MG Oral Cap Take 1 capsule (100 mg total) by mouth 3 (three) times daily as needed for cough. 15 capsule 0    levothyroxine 25 MCG Oral Tab Take 1 tablet (25 mcg total) by mouth daily. 90 tablet 3    ipratropium 0.06 % Nasal Solution 1 spray by Nasal route 2 (two) times daily. 1 each 5    amLODIPine 5 MG Oral Tab Take 0.5 tablets (2.5 mg total) by mouth daily as needed. 30 tablet 0    anastrozole 1 MG Oral Tab tab Take 1 tablet (1 mg total) by mouth daily. 90 tablet 3    acetaminophen 160 MG/5ML Oral Liquid Take 10.2 mL (325 mg total) by mouth every 4 (four) hours as needed for Fever. (Patient not taking: Reported on  2023) 236 mL 0    Olmesartan Medoxomil 20 MG Oral Tab Take 1 tablet (20 mg total) by mouth daily.      Metoprolol Succinate ER 25 MG Oral Tablet 24 Hr Take 1 tablet (25 mg total) by mouth daily. 1/2 tablet  5    hydrochlorothiazide 25 MG Oral Tab Take 1 tablet (25 mg total) by mouth daily.      Cholecalciferol 1000 UNITS Oral Chew Tab Chew 2 tablets by mouth daily.      aspirin 81 MG Oral Tab Take 1 tablet (81 mg total) by mouth daily.  0    Multiple Vitamins-Minerals (MULTIVITAMIN OR) Take by mouth as needed.           VITALS:    Vitals:    24 0920   BP: 143/79   Pulse: 78   Resp: 16   Temp: 98.4 °F (36.9 °C)           PS:  ECO    PHYSICAL EXAM:    General: alert and oriented x 3, not in acute distress.  HEENT: AMIRAH, oropharynx  clear.  Neck: supple.  No JVD /adenopathy.  CVS: S1S2, regular  Rhythm, no murmurs.   Lungs: Clear to auscultation and percussion.  Abdomen: Soft, non tender, no hepato-splenomegaly.  Extremities:  No edema.  CNS: no focal deficit  Breasts: Both breasts were soft.  Left shows well-healed lumpectomy scar.  No masses or axillary adenopathy.  Some rash posterior axillary fold right side.      Emotional well being: Patient's emotional well being was assessed.  No issues requiring acute psychosocial intervention were identified.     LABS:     Recent Results (from the past 24 hours)   TSH W Reflex To Free T4    Collection Time: 24  8:12 AM   Result Value Ref Range    TSH 3.668 0.550 - 4.780 uIU/mL   Lipid Panel    Collection Time: 24  8:12 AM   Result Value Ref Range    Cholesterol, Total 154 <200 mg/dL    HDL Cholesterol 70 (H) 40 - 59 mg/dL    Triglycerides 53 30 - 149 mg/dL    LDL Cholesterol 73 <100 mg/dL    VLDL 8 0 - 30 mg/dL    Non HDL Chol 84 <130 mg/dL    Patient Fasting for Lipid? Yes    Comp Metabolic Panel (14)    Collection Time: 24  8:12 AM   Result Value Ref Range    Glucose 82 70 - 99 mg/dL    Sodium 142 136 - 145 mmol/L    Potassium 3.9 3.5 -  5.1 mmol/L    Chloride 106 98 - 112 mmol/L    CO2 28.0 21.0 - 32.0 mmol/L    Anion Gap 8 0 - 18 mmol/L    BUN 16 9 - 23 mg/dL    Creatinine 0.89 0.55 - 1.02 mg/dL    Calcium, Total 9.6 8.7 - 10.4 mg/dL    Calculated Osmolality 294 275 - 295 mOsm/kg    eGFR-Cr 67 >=60 mL/min/1.73m2    AST 24 <34 U/L    ALT 14 10 - 49 U/L    Alkaline Phosphatase 70 55 - 142 U/L    Bilirubin, Total 0.8 0.2 - 1.1 mg/dL    Total Protein 6.7 5.7 - 8.2 g/dL    Albumin 4.1 3.2 - 4.8 g/dL    Globulin  2.6 2.0 - 3.5 g/dL    A/G Ratio 1.6 1.0 - 2.0    Patient Fasting for CMP? Yes        ASSESSMENT AND PLAN:     # L breast DCIS s/p lumpectomy:  DCIS 9 mm, and 4 mm of DCIS on bx and residual 2 mm dual DCIS on lumpectomy 12/19,  ER/MS+.  Declined RT.  Started anastrozole 1/20, continue till 1/25 then may stop.  Alanna mammo  6/24, R calcs. Bx benign. Jerome R mammo 12/24 and alanna mammo 6/25.    # Osteopenia: DEXA 6/24 showed stable osteopenia with T score 1.7, better compared to T-score -1.9 in 2022.  Repeat DEXA 6/26.    Survivorship issues were addressed with the patient.  No issues were identified by the patient.       ORDERS PLACED:        Procedures    TIKA RAMON 2D+3D DIAGNOSTIC TIKA RIGHT (CPT=77065/28032)       Return in about 1 year (around 12/20/2025) for MD visit.    Marisa Rayo M.D.    [facility]    [facility]  [address] [place] [address] [place], [address]      12/20/2024

## 2024-12-20 ENCOUNTER — LAB ENCOUNTER (OUTPATIENT)
Dept: LAB | Facility: HOSPITAL | Age: 74
End: 2024-12-20
Attending: INTERNAL MEDICINE
Payer: MEDICARE

## 2024-12-20 ENCOUNTER — OFFICE VISIT (OUTPATIENT)
Age: 74
End: 2024-12-20
Attending: INTERNAL MEDICINE
Payer: MEDICARE

## 2024-12-20 VITALS
WEIGHT: 140.5 LBS | TEMPERATURE: 98 F | OXYGEN SATURATION: 98 % | BODY MASS INDEX: 27 KG/M2 | SYSTOLIC BLOOD PRESSURE: 143 MMHG | RESPIRATION RATE: 16 BRPM | HEART RATE: 78 BPM | DIASTOLIC BLOOD PRESSURE: 79 MMHG

## 2024-12-20 DIAGNOSIS — R00.2 PALPITATIONS: ICD-10-CM

## 2024-12-20 DIAGNOSIS — E03.9 HYPOTHYROIDISM: ICD-10-CM

## 2024-12-20 DIAGNOSIS — I49.1 PREMATURE ATRIAL CONTRACTIONS: ICD-10-CM

## 2024-12-20 DIAGNOSIS — E78.00 PURE HYPERCHOLESTEROLEMIA: ICD-10-CM

## 2024-12-20 DIAGNOSIS — D05.12 DUCTAL CARCINOMA IN SITU (DCIS) OF LEFT BREAST: Primary | ICD-10-CM

## 2024-12-20 DIAGNOSIS — I10 HYPERTENSION, BENIGN: ICD-10-CM

## 2024-12-20 DIAGNOSIS — T50.905D ADVERSE EFFECT OF DRUG, SUBSEQUENT ENCOUNTER: ICD-10-CM

## 2024-12-20 DIAGNOSIS — R94.31 ABNORMAL EKG: Primary | ICD-10-CM

## 2024-12-20 LAB
ALBUMIN SERPL-MCNC: 4.1 G/DL (ref 3.2–4.8)
ALBUMIN/GLOB SERPL: 1.6 {RATIO} (ref 1–2)
ALP LIVER SERPL-CCNC: 70 U/L
ALT SERPL-CCNC: 14 U/L
ANION GAP SERPL CALC-SCNC: 8 MMOL/L (ref 0–18)
AST SERPL-CCNC: 24 U/L (ref ?–34)
BILIRUB SERPL-MCNC: 0.8 MG/DL (ref 0.2–1.1)
BUN BLD-MCNC: 16 MG/DL (ref 9–23)
CALCIUM BLD-MCNC: 9.6 MG/DL (ref 8.7–10.4)
CHLORIDE SERPL-SCNC: 106 MMOL/L (ref 98–112)
CHOLEST SERPL-MCNC: 154 MG/DL (ref ?–200)
CO2 SERPL-SCNC: 28 MMOL/L (ref 21–32)
CREAT BLD-MCNC: 0.89 MG/DL
EGFRCR SERPLBLD CKD-EPI 2021: 67 ML/MIN/1.73M2 (ref 60–?)
FASTING PATIENT LIPID ANSWER: YES
FASTING STATUS PATIENT QL REPORTED: YES
GLOBULIN PLAS-MCNC: 2.6 G/DL (ref 2–3.5)
GLUCOSE BLD-MCNC: 82 MG/DL (ref 70–99)
HDLC SERPL-MCNC: 70 MG/DL (ref 40–59)
LDLC SERPL CALC-MCNC: 73 MG/DL (ref ?–100)
NONHDLC SERPL-MCNC: 84 MG/DL (ref ?–130)
OSMOLALITY SERPL CALC.SUM OF ELEC: 294 MOSM/KG (ref 275–295)
POTASSIUM SERPL-SCNC: 3.9 MMOL/L (ref 3.5–5.1)
PROT SERPL-MCNC: 6.7 G/DL (ref 5.7–8.2)
SODIUM SERPL-SCNC: 142 MMOL/L (ref 136–145)
TRIGL SERPL-MCNC: 53 MG/DL (ref 30–149)
TSI SER-ACNC: 3.67 UIU/ML (ref 0.55–4.78)
VLDLC SERPL CALC-MCNC: 8 MG/DL (ref 0–30)

## 2024-12-20 PROCEDURE — 84443 ASSAY THYROID STIM HORMONE: CPT

## 2024-12-20 PROCEDURE — 36415 COLL VENOUS BLD VENIPUNCTURE: CPT

## 2024-12-20 PROCEDURE — 80061 LIPID PANEL: CPT

## 2024-12-20 PROCEDURE — 80053 COMPREHEN METABOLIC PANEL: CPT

## 2024-12-20 NOTE — PROGRESS NOTES
Education Record    Learner:  Patient    Disease / Diagnosis:  Annual fu   Left breast DCIS     Barriers / Limitations:  None   Comments:    Method:  Discussion   Comments:    General Topics:  Plan of care reviewed   Comments:    Outcome:  Shows understanding   Comments:   Taking anastrozole. Some muscle aching but it's ok   Had breast biopsy in June benign findings.

## 2025-01-10 ENCOUNTER — OFFICE VISIT (OUTPATIENT)
Dept: INTERNAL MEDICINE CLINIC | Facility: CLINIC | Age: 75
End: 2025-01-10
Payer: MEDICARE

## 2025-01-10 VITALS
SYSTOLIC BLOOD PRESSURE: 134 MMHG | RESPIRATION RATE: 16 BRPM | HEART RATE: 83 BPM | WEIGHT: 140 LBS | BODY MASS INDEX: 29.39 KG/M2 | OXYGEN SATURATION: 94 % | TEMPERATURE: 99 F | DIASTOLIC BLOOD PRESSURE: 88 MMHG | HEIGHT: 58 IN

## 2025-01-10 DIAGNOSIS — G47.33 OSA ON CPAP: ICD-10-CM

## 2025-01-10 DIAGNOSIS — Z85.3 HISTORY OF BREAST CANCER IN FEMALE: ICD-10-CM

## 2025-01-10 DIAGNOSIS — R90.82 WHITE MATTER ABNORMALITY ON MRI OF BRAIN: ICD-10-CM

## 2025-01-10 DIAGNOSIS — E03.9 ACQUIRED HYPOTHYROIDISM: ICD-10-CM

## 2025-01-10 DIAGNOSIS — R73.9 HYPERGLYCEMIA: ICD-10-CM

## 2025-01-10 DIAGNOSIS — Z00.00 ENCOUNTER FOR ANNUAL HEALTH EXAMINATION: ICD-10-CM

## 2025-01-10 DIAGNOSIS — E78.00 PURE HYPERCHOLESTEROLEMIA: ICD-10-CM

## 2025-01-10 DIAGNOSIS — I49.1 PREMATURE ATRIAL CONTRACTIONS: ICD-10-CM

## 2025-01-10 DIAGNOSIS — Q23.81 BICUSPID AORTIC VALVE: ICD-10-CM

## 2025-01-10 DIAGNOSIS — I10 HYPERTENSION, BENIGN: Primary | ICD-10-CM

## 2025-01-10 DIAGNOSIS — M85.80 OSTEOPENIA, UNSPECIFIED LOCATION: ICD-10-CM

## 2025-01-20 NOTE — PROGRESS NOTES
Subjective:   Lisa Malone is a 74 year old female who presents for a Medicare Subsequent Annual Wellness visit (Pt already had Initial Annual Wellness) and scheduled follow up of multiple significant but stable problems.   Here for awv and f/u of chronic medical condtiison.   Pt has stable htn, highcol an dhypothryodi, on meds, labs stable reviwed. Doing well overall, feels well.   History/Other:   Fall Risk Assessment:   She has been screened for Falls and is low risk.      Cognitive Assessment:   She had a completely normal cognitive assessment - see flowsheet entries     Functional Ability/Status:   Lisa Malone has some abnormal functions as listed below:  She has difficulties Affording Meds based on screening of functional status.       Depression Screening (PHQ):  PHQ-2 SCORE: 0  , done 1/10/2025             Advanced Directives:   She does NOT have a Living Will. [Do you have a living will?: No]  She does NOT have a Power of  for Health Care. [Do you have a healthcare power of ?: No]  Discussed Advance Care Planning with patient (and family/surrogate if present). Standard forms made available to patient in After Visit Summary.      Patient Active Problem List   Diagnosis    Hypertension, benign    Hypothyroidism    White matter abnormality on MRI of brain    Osteopenia    Hyperglycemia    AUDREY on CPAP    Pure hypercholesterolemia    Premature atrial contractions    Bicuspid aortic valve    History of breast cancer in female     Allergies:  She is allergic to epinephrine.    Current Medications:  Outpatient Medications Marked as Taking for the 1/10/25 encounter (Office Visit) with Schriedel, Adam, MD   Medication Sig    benzonatate (TESSALON PERLES) 100 MG Oral Cap Take 1 capsule (100 mg total) by mouth 3 (three) times daily as needed for cough.    levothyroxine 25 MCG Oral Tab Take 1 tablet (25 mcg total) by mouth daily.    ipratropium 0.06 % Nasal Solution 1 spray by Nasal route  2 (two) times daily.    amLODIPine 5 MG Oral Tab Take 0.5 tablets (2.5 mg total) by mouth daily as needed.    acetaminophen 160 MG/5ML Oral Liquid Take 10.2 mL (325 mg total) by mouth every 4 (four) hours as needed for Fever.    Olmesartan Medoxomil 20 MG Oral Tab Take 1 tablet (20 mg total) by mouth daily.    Metoprolol Succinate ER 25 MG Oral Tablet 24 Hr Take 1 tablet (25 mg total) by mouth daily. 1/2 tablet    hydrochlorothiazide 25 MG Oral Tab Take 1 tablet (25 mg total) by mouth daily.    Cholecalciferol 1000 UNITS Oral Chew Tab Chew 2 tablets by mouth daily.    aspirin 81 MG Oral Tab Take 1 tablet (81 mg total) by mouth daily.    Multiple Vitamins-Minerals (MULTIVITAMIN OR) Take by mouth as needed.         Medical History:  She  has a past medical history of Abnormal EKG (2015), Amenorrhea, Anesthesia complication, Breast CA (HCC) (2019), Cancer (HCC) (2019), Ductal carcinoma in situ of breast, Family history of coronary arteriosclerosis (10/31/2011), Fibroids, H/O bone density study (2013), H/O bone scan (2016), H/O mammogram (2014), H/O mammogram (2015), Hyperthyroidism, Hypothyroidism, MVP (mitral valve prolapse), AUDREY (obstructive sleep apnea), PAC (premature atrial contraction), Palpitations, and Unspecified essential hypertension.  Surgical History:  She  has a past surgical history that includes remv cartilage for graft nasal (); Colectomy (); other surgical history (); other surgical history (); other surgical history (Right, ); alex localization wire 1 site left (cpt=19281) (); alex localization wire 1 site right (cpt=19281) (); appendectomy; laparoscopy,pelvic,biopsy (); appendectomy (); colonoscopy (); ; colonoscopy (2017); colonoscopy (N/A, 2017); lumpectomy left (2019); and other.   Family History:  Her family history includes Breast Cancer (age of onset: 64) in her sister; Breast Cancer (age of onset: 71) in  her self; Cancer in her brother; DCIS in her self; Diabetes in her brother, maternal grandmother, and mother; Heart Attack in her father; Heart Disorder in her mother; Hypertension in her father and mother; Infectious Disease in her maternal grandmother; Stroke in her brother.  Social History:  She  reports that she quit smoking about 54 years ago. Her smoking use included cigarettes. She started smoking about 56 years ago. She has a 1 pack-year smoking history. She has never used smokeless tobacco. She reports current alcohol use. She reports that she does not use drugs.    Tobacco:  She smoked tobacco in the past but quit greater than 12 months ago.  Social History     Tobacco Use   Smoking Status Former    Current packs/day: 0.00    Average packs/day: 0.5 packs/day for 2.0 years (1.0 ttl pk-yrs)    Types: Cigarettes    Start date: 1968    Quit date: 1970    Years since quittin.1   Smokeless Tobacco Never          CAGE Alcohol Screen:   CAGE screening score of 0 on 1/10/2025, showing low risk of alcohol abuse.      Patient Care Team:  Schriedel, Adam, MD as PCP - General  Jeaneth Ahumada MD as Consulting Physician (Cardiovascular Diseases)  Yaw Alonso MD as Consulting Physician (NEUROLOGY)  Italo Pa MD (OBSTETRICS & GYNECOLOGY)  Phoenix Mcclelland NP (Nurse Practitioner)  Khurram Rivas MD (GASTROENTEROLOGY)  Lou Montoya MD (Radiation Oncology)  Maryan Mccurdy, CHARLENE as Registered Nurse  Iliana Lee RN as Registered Nurse (Registered Nurse)  Vel Wallis, PT, DPT, OCS, Cert MDT  as Physical Therapist (Physical Therapy)  Bryanna Ahumada APRN (Nurse Practitioner Acute Care)  Daria Elizalde PA-C (Physician Assistant Medical)    Review of Systems     Negative except hpi  No f/c/chest pain or sob. No cough. No n/v/d. No ha or dizziness. No numbness, tingling, or weakness. No other complaints today.      Objective:   Physical Exam  General Appearance:  Alert, cooperative, no  distress, appears stated age   Head:  Normocephalic, without obvious abnormality, atraumatic   Eyes:  PERRL, conjunctiva/corneas clear, EOM's intact both eyes   Ears:  Normal TM's and external ear canals, both ears   Nose: Nares normal, septum midline,mucosa normal, no drainage or sinus tenderness   Throat: Lips, mucosa, and tongue normal; teeth and gums normal   Neck: Supple, symmetrical, trachea midline, no adenopathy;  thyroid: not enlarged, symmetric, no tenderness/mass/nodules; no carotid bruit or JVD   Back:   Symmetric, no curvature, ROM normal, no CVA tenderness   Lungs:   Clear to auscultation bilaterally, respirations unlabored   Heart:  Regular rate and rhythm, S1 and S2 normal, no murmur, rub, or gallop   Abdomen:   Soft, non-tender, bowel sounds active all four quadrants,  no masses, no organomegaly   Pelvic: Deferred   Extremities: Extremities normal, atraumatic, no cyanosis or edema   Pulses: 2+ and symmetric   Skin: Skin color, texture, turgor normal, no rashes or lesions   Lymph nodes: Cervical, supraclavicular, and axillary nodes normal   Neurologic: Normal       /88   Pulse 83   Temp 98.6 °F (37 °C)   Resp 16   Ht 4' 10\" (1.473 m)   Wt 140 lb (63.5 kg)   LMP  (LMP Unknown)   SpO2 94%   BMI 29.26 kg/m²  Estimated body mass index is 29.26 kg/m² as calculated from the following:    Height as of this encounter: 4' 10\" (1.473 m).    Weight as of this encounter: 140 lb (63.5 kg).      Assessment & Plan:   Lisa Malone is a 74 year old female who presents for a Medicare Assessment.     1. Bicuspid aortic valve (Primary)  -     Detailed, Mod Complex (81008)  Stable contienu observation with cardiology  2. Hypertension, benign  -     Detailed, Mod Complex (39412)  Well controlled continue meds  3. Premature atrial contractions  -     Detailed, Mod Complex (33236)  Stable asymptomatic  4. Pure hypercholesterolemia  -     Detailed, Mod Complex (28423)  Stable contienu meds; lab  reviewed  5. Hyperglycemia  -     Detailed, Mod Complex (95774)  Stable contieu oibservation  6. Acquired hypothyroidism  -     Detailed, Mod Complex (00201)  Stable contienu meds labs stable  7. White matter abnormality on MRI of brain  -     Detailed, Mod Complex (44049)  Stable contienu observation  8. Osteopenia, unspecified location  -     Detailed, Mod Complex (34214)  Stable contienu observation  9. History of breast cancer in female  -     Detailed, Mod Complex (60696)  Resolved   10. AUDREY on CPAP  -     Detailed, Mod Complex (99214)  Well controlled contienu cpap  11. Encounter for annual health examination    The patient indicates understanding of these issues and agrees to the plan.        Return in 1 year (on 1/10/2026).     Adam Schriedel, MD, 1/19/2025     Supplementary Documentation:   General Health:  In the past six months, have you lost more than 10 pounds without trying?: 2 - No  Has your appetite been poor?: No  Type of Diet: Balanced  How does the patient maintain a good energy level?: Appropriate Exercise;Daily Walks  How would you describe your daily physical activity?: Moderate  How would you describe your current health state?: Good  How do you maintain positive mental well-being?: Social Interaction;Puzzles;Visiting Friends;Visiting Family  On a scale of 0 to 10, with 0 being no pain and 10 being severe pain, what is your pain level?: 0 - (None)  In the past six months, have you experienced urine leakage?: 0-No  At any time do you feel concerned for the safety/well-being of yourself and/or your children, in your home or elsewhere?: No  Have you had any immunizations at another office such as Influenza, Hepatitis B, Tetanus, or Pneumococcal?: No    Health Maintenance   Topic Date Due    Zoster Vaccines (1 of 2) Never done    Colorectal Cancer Screening  12/07/2022    COVID-19 Vaccine (7 - 2024-25 season) 09/01/2024    Annual Physical  01/10/2025    Influenza Vaccine  Completed    DEXA Scan   Completed    Annual Depression Screening  Completed    Fall Risk Screening (Annual)  Completed    Pneumococcal Vaccine: 50+ Years  Completed    Meningococcal B Vaccine  Aged Out    Mammogram  Discontinued

## 2025-01-22 ENCOUNTER — HOSPITAL ENCOUNTER (OUTPATIENT)
Dept: MAMMOGRAPHY | Facility: HOSPITAL | Age: 75
Discharge: HOME | End: 2025-01-22
Attending: INTERNAL MEDICINE
Payer: MEDICARE

## 2025-01-22 DIAGNOSIS — D05.12 DUCTAL CARCINOMA IN SITU (DCIS) OF LEFT BREAST: ICD-10-CM

## 2025-01-22 PROCEDURE — 77065 DX MAMMO INCL CAD UNI: CPT | Performed by: INTERNAL MEDICINE

## 2025-01-22 PROCEDURE — 77061 BREAST TOMOSYNTHESIS UNI: CPT | Performed by: INTERNAL MEDICINE

## 2025-07-09 RX ORDER — LEVOTHYROXINE SODIUM 25 UG/1
25 TABLET ORAL DAILY
Qty: 90 TABLET | Refills: 3 | Status: SHIPPED | OUTPATIENT
Start: 2025-07-09

## 2025-07-09 NOTE — TELEPHONE ENCOUNTER
Refill passes per [facility] protocol.    No future appointments with primary care medicine

## 2025-08-01 ENCOUNTER — TELEPHONE (OUTPATIENT)
Dept: INTERNAL MEDICINE CLINIC | Facility: CLINIC | Age: 75
End: 2025-08-01

## 2025-08-06 ENCOUNTER — LAB ENCOUNTER (OUTPATIENT)
Dept: LAB | Facility: HOSPITAL | Age: 75
End: 2025-08-06
Attending: INTERNAL MEDICINE

## 2025-08-06 DIAGNOSIS — R00.2 PALPITATIONS: ICD-10-CM

## 2025-08-06 DIAGNOSIS — E03.9 HYPOTHYROIDISM: ICD-10-CM

## 2025-08-06 DIAGNOSIS — I10 HYPERTENSION, BENIGN: Primary | ICD-10-CM

## 2025-08-06 DIAGNOSIS — E78.00 PURE HYPERCHOLESTEROLEMIA: ICD-10-CM

## 2025-08-06 LAB
ALBUMIN SERPL-MCNC: 4.4 G/DL (ref 3.2–4.8)
ALBUMIN/GLOB SERPL: 1.5 (ref 1–2)
ALP LIVER SERPL-CCNC: 69 U/L (ref 55–142)
ALT SERPL-CCNC: 13 U/L (ref 10–49)
ANION GAP SERPL CALC-SCNC: 8 MMOL/L (ref 0–18)
AST SERPL-CCNC: 14 U/L (ref ?–34)
BILIRUB SERPL-MCNC: 0.7 MG/DL (ref 0.2–1.1)
BUN BLD-MCNC: 10 MG/DL (ref 9–23)
CALCIUM BLD-MCNC: 9.6 MG/DL (ref 8.7–10.6)
CHLORIDE SERPL-SCNC: 99 MMOL/L (ref 98–112)
CHOLEST SERPL-MCNC: 173 MG/DL (ref ?–200)
CO2 SERPL-SCNC: 29 MMOL/L (ref 21–32)
CREAT BLD-MCNC: 0.97 MG/DL (ref 0.55–1.02)
EGFRCR SERPLBLD CKD-EPI 2021: 61 ML/MIN/1.73M2 (ref 60–?)
FASTING PATIENT LIPID ANSWER: YES
FASTING STATUS PATIENT QL REPORTED: YES
GLOBULIN PLAS-MCNC: 3 G/DL (ref 2–3.5)
GLUCOSE BLD-MCNC: 89 MG/DL (ref 70–99)
HDLC SERPL-MCNC: 74 MG/DL (ref 40–59)
LDLC SERPL CALC-MCNC: 85 MG/DL (ref ?–100)
NONHDLC SERPL-MCNC: 99 MG/DL (ref ?–130)
OSMOLALITY SERPL CALC.SUM OF ELEC: 281 MOSM/KG (ref 275–295)
POTASSIUM SERPL-SCNC: 4.2 MMOL/L (ref 3.5–5.1)
PROT SERPL-MCNC: 7.4 G/DL (ref 5.7–8.2)
SODIUM SERPL-SCNC: 136 MMOL/L (ref 136–145)
TRIGL SERPL-MCNC: 72 MG/DL (ref 30–149)
TSI SER-ACNC: 2.31 UIU/ML (ref 0.55–4.78)
VLDLC SERPL CALC-MCNC: 11 MG/DL (ref 0–30)

## 2025-08-06 PROCEDURE — 84443 ASSAY THYROID STIM HORMONE: CPT

## 2025-08-06 PROCEDURE — 36415 COLL VENOUS BLD VENIPUNCTURE: CPT

## 2025-08-06 PROCEDURE — 80053 COMPREHEN METABOLIC PANEL: CPT

## 2025-08-06 PROCEDURE — 80061 LIPID PANEL: CPT

## 2025-08-15 ENCOUNTER — HOSPITAL ENCOUNTER (OUTPATIENT)
Dept: MAMMOGRAPHY | Facility: HOSPITAL | Age: 75
Discharge: HOME | End: 2025-08-15
Attending: INTERNAL MEDICINE

## 2025-08-15 DIAGNOSIS — D05.12 DUCTAL CARCINOMA IN SITU (DCIS) OF LEFT BREAST: ICD-10-CM

## 2025-08-15 PROCEDURE — 77062 BREAST TOMOSYNTHESIS BI: CPT | Performed by: INTERNAL MEDICINE

## 2025-08-15 PROCEDURE — 77066 DX MAMMO INCL CAD BI: CPT | Performed by: INTERNAL MEDICINE

## (undated) DEVICE — SUTURE MONOCRYL 4-0 PS-2

## (undated) DEVICE — DRAPE PACK CHEST & U BAR

## (undated) DEVICE — HEMOCLIP HORIZON MED 002200

## (undated) DEVICE — TOWEL OR BLU 16X26 STRL

## (undated) DEVICE — CAUTERY BLADE 2IN INS E1455

## (undated) DEVICE — GOWN,SIRUS,FABRIC-REINFORCED,LARGE: Brand: MEDLINE

## (undated) DEVICE — 3M™ STERI-DRAPE™ INSTRUMENT POUCH 1018: Brand: STERI-DRAPE™

## (undated) DEVICE — SOL  .9 1000ML BTL

## (undated) DEVICE — BRA SURGICAL ELIZABETH PINK L

## (undated) DEVICE — STERILE LATEX POWDER-FREE SURGICAL GLOVES WITH HYDROGEL COATING, SMOOTH FINISH, STRAIGHT FINGER: Brand: PROTEXIS

## (undated) DEVICE — SUTURE VICRYL 3-0 SH

## (undated) DEVICE — CONT SPEC SURG FAXITRON WEDGE

## (undated) DEVICE — UNDERPAD INCNT 30X30IN PP HVY

## (undated) DEVICE — ABDOMINAL PAD: Brand: DERMACEA

## (undated) DEVICE — KENDALL SCD EXPRESS SLEEVES, KNEE LENGTH, MEDIUM: Brand: KENDALL SCD

## (undated) DEVICE — SUTURE SILK 2-0 FS

## (undated) DEVICE — DRAPE,TAPE STRIPS,STERILE: Brand: MEDLINE

## (undated) DEVICE — HEMOCLIP HORIZON SM 001200

## (undated) DEVICE — BREAST-HERNIA-PORT CDS-LF: Brand: MEDLINE INDUSTRIES, INC.

## (undated) NOTE — ED AVS SNAPSHOT
Fuentes Serjiomichelle   MRN: [id number]    Department:  [facility] Emergency Department   Date of Visit:  5/27/2018           Disclosure     Insurance plans vary and the physician(s) referred by the ER may not be covered by your plan.  Please contact your tell this physician (or your personal doctor if your instructions are to return to your personal doctor) about any new or lasting problems. The primary care or specialist physician will see patients referred from the [facility] Emergency Department.  Alexis Gaona

## (undated) NOTE — ED AVS SNAPSHOT
Loco Ventura   MRN: [id number]    Department:  [facility] Emergency Department   Date of Visit:  12/3/2017           Disclosure     Insurance plans vary and the physician(s) referred by the ER may not be covered by your plan.  Please contact your tell this physician (or your personal doctor if your instructions are to return to your personal doctor) about any new or lasting problems. The primary care or specialist physician will see patients referred from the [facility] Emergency Department.  Hamilton Alanis

## (undated) NOTE — ED AVS SNAPSHOT
[facility] Emergency Department    [facility]  [address] [address]    Phone:  [phone]    Fax:  [address]   MRN: [id number]    Department:  [facility] Emergency Department   Date of Visit:  6/20/ IF THERE IS ANY CHANGE OR WORSENING OF YOUR CONDITION, CALL YOUR PRIMARY CARE PHYSICIAN AT ONCE OR RETURN IMMEDIATELY TO THE EMERGENCY DEPARTMENT.     If you have been prescribed any medication(s), please fill your prescription right away and begin taking t

## (undated) NOTE — IP AVS SNAPSHOT
[facility] [place]  [address] [place], [address] ~ [phone]                Discharge Summary   3/31/2017    [address]           Admission Information        Provider Department    3/31/2017 DO Liban Rubio 2ne Consume a low fat, low sodium, low cholesterol diet. Take all medications as prescribed. Attend all follow up appointments as instructed. For chest pain lasting longer than 5 minutes always seek medical attention.       Discharge References/Attachm Most Recent Value    All belongings returned to patient at discharge Pt's bedside belongings    Medications Sent Home None to return    Medications Returned:           Additional Information       We are concerned for your overall well being:    - If you review your medications with you before you are discharged, and can provide you with additional printed information. Not all patients will experience these side effects or respond to medications the same.  Please call your provider or healthcare team if you

## (undated) NOTE — MR AVS SNAPSHOT
[facility]  [address] [address] [phone]               Thank you for choosing us for your health care visit with Ky Key MD.  We are glad to serve you and happy to provide you with this summa prediabetic patients        Cardiovascular Disease Screening     Cholesterol, covered every 5 yrs including Total, LDL and Trigs LDL CHOLESTEROL (mg/dL)   Date Value   01/09/2017 54     LDL CHOLESTROL (mg/dL)   Date Value   05/08/2014 62     CHOLESTEROL, T diagnosis related to osteoporosis or estrogen deficiency. Biennial benefit unless patient has history of long-term glucocorticoid use for medical condition    Last Dexa Scan:   XR DEXA BONE DENSITOMETRY (CPT=77080) 01/14/2016    No flowsheet data found. Zoster (Not covered by Medicare Part B) No orders found for this or any previous visit. This may be covered with your pharmacy  prescription benefits     Recommended Websites for Advanced Directives    [email or web address] What changed:  See the new instructions. Commonly known as:  SYNTHROID, LEVOTHROID           MULTIVITAMIN OR   Take  by mouth.                 Where to Get Your Medications      These medications were sent to [facility]  [address] [place] -

## (undated) NOTE — Clinical Note
[facility]  A. Notifier:    CUBA Lombardi  [id number]    Advance Beneficiary Notice of Noncoverage (ABN)    Note:  If Medicare doesn't pay for D.     below, you may have to pay.   Medicare does not pay for everything, even some care that Option 3:  I don't want the D.    listed above. I understand with this choice I am not responsible for payment, and I cannot appeal to see if Medicare would pay. H.  Additional Information:     This notice gives our opinion, not an official Medicare de

## (undated) NOTE — ED AVS SNAPSHOT
[facility] Emergency Department    [place] [address]    Phone:  [phone]    Fax:  [address]   MRN: [id number]    Department:  [facility] Emergency Department   Date of Visit:  6/19/ Insurance plans vary and the physician(s) referred by the ER may not be covered by your plan. Please contact your insurance company to determine coverage for follow-up care and referrals.     [facility]  Main [phone]  Pediatric If the emergency physician has read X-rays, these will be re-interpreted by a radiologist.  If there is a significant change in your reading, you will be contacted. Please make sure we have your correct phone number before you leave.  After you leave, you s and ask to get set up for an insurance coverage that is in-network with [employer] [facility].         Imaging Results         CT BRAIN OR HEAD (39172) (Final result) Result time:  06/19/17 21:49:34    Final result    Impression:    PROCEDURE:  CT BRAIN OR office, you can view your past visit information in [facility] by going to Visits < Visit Summaries. GoSave questions? Call [phone] for help. GoSave is NOT to be used for urgent needs. For medical emergencies, dial 911.

## (undated) NOTE — MR AVS SNAPSHOT
[address], [facility] [address] [address] [phone]               Thank you for choosing us for your health care visit with oTm Miguel MD.  We are glad to serve you and happy to provide you with this summary Instructions: To schedule an appointment for your radiology test please call Anahi Levy Scheduling at [phone].          MyChart     Visit Telisma  You can access your MyChart to more actively manage your health care and view more details

## (undated) NOTE — LETTER
12/01/20        [address]  [address] [facility] [address]      Dear Jorge Fairbanks,    [address] records indicate that you have outstanding lab work and or testing that was ordered for you and has not yet been completed:  Orders Placed This Encounter

## (undated) NOTE — MR AVS SNAPSHOT
[facility] [address] [phone]               Thank you for choosing us for your health care visit with Silvano Avalos MD.  We are glad to serve you and happy to provide you with this summa Take 1 tablet by mouth daily. Cholecalciferol 1000 units Chew   Chew 2 tablets by mouth daily.            DIOVAN -25 MG Tabs   Generic drug:  Valsartan-Hydrochlorothiazide   1 TABLET DAILY           Levothyroxine Sodium 25 MCG Tabs   Take 1 are inactive.      HOW TO GET STARTED: HOW TO STAY MOTIVATED:   Start activities slowly and build up over time Do what you like   Get your heart pumping – brisk walking, biking, swimming Even 10 minute increments are effective and add up over the week   2 ½

## (undated) NOTE — MR AVS SNAPSHOT
[facility] [address] [place], [place] [address] [facility] [address]  [phone]               Thank you for choosing us for your health care visit with Phoenix Mcclelland NP.   We are glad to serve you and happy to provide you with this summary Take 1 tablet (4 mg total) by mouth every 4 (four) hours as needed for Nausea.    Commonly known as:  [address] can access your MyChart to more actively manage your health care and view more details from [facility]

## (undated) NOTE — ED AVS SNAPSHOT
[facility]    [address] [place] [address]    Phone:  [phone]    Fax:  [phone]           Maria Del Rosario Gonzales   MRN: [id number]    Department:  THE [facility]   Date of Visit:  [address]  [address], [address] [place] [address]  [phone] [address]  [address]  [phone] [address] [place] [address] [address] [facility] [address]   [phone]       To Check ER Wait Times:  CRISTINA reading, you will be contacted. Please make sure we have your correct phone number before you leave. After you leave, you should follow the attached instructions. I have read and understand the instructions given to me by my caregivers.         24-Hour XR CHEST PA + LAT CHEST (CPT=71020) (In process)       MyChart     Visit Prowl  You can access your Mamina Shkolahart to more actively manage your health care and view more details from this visit by going to [email or web address].   If you've recently had

## (undated) NOTE — ED AVS SNAPSHOT
Alyson Tran   MRN: [id number]    Department:  [facility] Emergency Department   Date of Visit:  12/5/2019           Disclosure     Insurance plans vary and the physician(s) referred by the ER may not be covered by your plan.  Please contact your tell this physician (or your personal doctor if your instructions are to return to your personal doctor) about any new or lasting problems. The primary care or specialist physician will see patients referred from the [facility] Emergency Department.  Lala Khan

## (undated) NOTE — LETTER
ABRIL Notifier: Daryn/SimpleLegal   CUBA Patient Name: Yarelis Alcantara.  Identification Number: [id number]      Advance Beneficiary Notice of Noncoverage (ABN)  NOTE:  If Medicare doesn’t pay for D pap smear, pelvic and breast exam below, you may have to p may ask to be paid now as I am responsible for payment. I cannot appeal if Medicare is not billed. ? OPTION 3. I don’t want the D. listed above.  I understand with this choice  I am not responsible for payment, and I cannot appeal to see if Medicare would

## (undated) NOTE — ED AVS SNAPSHOT
[facility] Emergency Department    [facility]  [address] [address]    Phone:  [phone]    Fax:  [address]   MRN: [id number]    Department:  [facility] Emergency Department   Date of Visit:  6/19/ IF THERE IS ANY CHANGE OR WORSENING OF YOUR CONDITION, CALL YOUR PRIMARY CARE PHYSICIAN AT ONCE OR RETURN IMMEDIATELY TO THE EMERGENCY DEPARTMENT.     If you have been prescribed any medication(s), please fill your prescription right away and begin taking t

## (undated) NOTE — LETTER
ABRIL Notifier: Daryn/Space Monkey   CUBA Patient Name: Stella Barnett. Identification Number: [id number]      Advance Beneficiary Notice of Noncoverage (ABN)  NOTE:  If Medicare doesn’t pay for D. Breast,pelvic,papbelow, you may have to pay.   Medicare hernandez Medicare. You may ask to be paid now as I am responsible for payment. I cannot appeal if Medicare is not billed. ? OPTION 3. I don’t want the D. Breast,pelvic,paplisted above.  I understand with this choice  I am not responsible for payment, and I cannot senia

## (undated) NOTE — ED AVS SNAPSHOT
[facility] Emergency Department    [place]  [address] [address]    Phone:  [phone]    Fax:  [address]   MRN: [id number]    Department:  [facility] Emergency Department   Date of Visit:  6/20/ Pediatric [address] 3314 Emergency Department   [phone]       To Check ER Wait Times:  TEXT 'ERwait' to 16883      Click [email or web address]      Or call [phone]    If you have any problems with your follow-up, please call our case [facility] before you leave. After you leave, you should follow the attached instructions. I have read and understand the instructions given to me by my caregivers. 24-Hour Pharmacies        Pharmacy Address Phone Number   [address]  [address]. view more details from this visit by going to [email or web address]. If you've recently had a stay at the Hospital you can access your discharge instructions in [facility] by going to Visits < Admission Summaries.  If you've been to the Emergency Depar